# Patient Record
Sex: FEMALE | Race: WHITE | Employment: UNEMPLOYED | URBAN - METROPOLITAN AREA
[De-identification: names, ages, dates, MRNs, and addresses within clinical notes are randomized per-mention and may not be internally consistent; named-entity substitution may affect disease eponyms.]

---

## 2017-01-03 ENCOUNTER — OFFICE VISIT (OUTPATIENT)
Dept: PSYCHOLOGY | Facility: CLINIC | Age: 52
End: 2017-01-03
Payer: COMMERCIAL

## 2017-01-03 DIAGNOSIS — F41.1 GENERALIZED ANXIETY DISORDER: Primary | ICD-10-CM

## 2017-01-03 DIAGNOSIS — F31.81 BIPOLAR II DISORDER, MOST RECENT EPISODE MAJOR DEPRESSIVE (H): ICD-10-CM

## 2017-01-03 PROCEDURE — 90834 PSYTX W PT 45 MINUTES: CPT | Performed by: PSYCHOLOGIST

## 2017-01-10 ENCOUNTER — OFFICE VISIT (OUTPATIENT)
Dept: PSYCHOLOGY | Facility: CLINIC | Age: 52
End: 2017-01-10
Payer: COMMERCIAL

## 2017-01-10 DIAGNOSIS — F41.1 GENERALIZED ANXIETY DISORDER: Primary | ICD-10-CM

## 2017-01-10 DIAGNOSIS — F31.81 BIPOLAR II DISORDER, MOST RECENT EPISODE MAJOR DEPRESSIVE (H): ICD-10-CM

## 2017-01-10 PROCEDURE — 90834 PSYTX W PT 45 MINUTES: CPT | Performed by: PSYCHOLOGIST
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
## 2017-01-17 NOTE — PROGRESS NOTES
Progress Note    Client Name: Andie Elkins  Date: 1/10/17         Service Type: Individual      Session Start Time: 3:00  Session End Time: 3:45      Session Length: 45     Session #: 20     Attendees: Client attended alone    Treatment Plan Last Reviewed: 1/10/17  PHQ-9 / NATHALIE-7 : 8/16/2016     DATA      Progress Since Last Session (Related to Symptoms / Goals / Homework):   Symptoms: Stable    Homework: Partially completed      Episode of Care Goals: Minimal progress - ACTION (Actively working towards change); Intervened by reinforcing change plan / affirming steps taken.        Current / Ongoing Stressors and Concerns:   Reported feeling more aligned with her wife after they set some limits with client's step-son during his recent visit. Reported that stress at work is dissipating and she is feeling supported by her supervisor.      Treatment Objective(s) Addressed in This Session:   mood stability  Anger management     Intervention:   Solution Focused: reviewed skills she has been using to manage anger appropriately; coached her on assertiveness skills to use with spouse and family  Supportive therapy: provided active listening, support, and encouragement. Reinforced healthy behavioral choices. Applauded client for her report of working with her spouse to mutually set limits with step-son.        ASSESSMENT: Current Emotional / Mental Status (status of significant symptoms):   Risk status (Self / Other harm or suicidal ideation)   Client denies current fears or concerns for personal safety.   Client denies current or recent suicidal ideation or behaviors. Reviewed safety plan.   Client denies current or recent homicidal ideation or behaviors.   Client denies current or recent self injurious behavior or ideation.   Client denies other safety concerns.   A safety and risk management plan has been developed including: see below     [facility]  "[facility]    Name: Andie Elkins    SAFETY PLAN:  Step 1: Warning signs / cues (Thoughts, images, mood, situation, behavior) that a crisis may be developing:    Thoughts:  It doesn't matter, I don't care    Images: \"I'm not sure\"    Thinking Processes: Ruminating    Mood: Angry, sad    Behaviors: Isolation, arguing with spouse    Situations:  Arguing with family, feeling criticized  Step 2: Coping strategies - Things I can do to take my mind off of my problems without contacting another person (relaxation technique, physical activity):    Distress Tolerance Strategies:  Distract with reading, watching TV, fishing, prayer; go to work as scheduled; take a nap    Physical Activities: Yard work, home projects, go for a walk    Focus on helpful thoughts:  There are people who care about me; I'm good at my job and they need me to be there for my shifts; My sebastian will help me get through this  Step 3: People and social settings that provide distraction:   Name:  Mike  Phone:    Name:  Kacy  Phone:   Social Settings: [employer]ago's, work, alanon meetings  Step 4: Remind myself of people and things that are important to me and worth living for:  Spouse, alanon sponsor, my house, my job  Step 5: When I am in crisis, I can ask these people to help me use my safety plan:   Name: Mike  Phone:    Name: Kacy  Phone:   Step 6: Making the environment safe:     Having space from spouse, not spending too much time at home  Step 7: Professionals or agencies I can contact during a crisis:    [facility] Daytime and After Hours Crisis Number: [phone]    Suicide Prevention Lifeline: 0-551-419-TALK (8135)    Text for Life: Text the word  LIFE  to 89011.  Local Crisis Services:     Call 911 or go to my nearest emergency department.   I helped develop this safety plan and agree to use it when needed.  I have been given a copy of this plan.      Client signature " _________________________________________________________________  Today s date: 9/13/2016  Adapted from Safety Plan Template 2008 Julieta Leyva and Germain Fisher is reprinted with the express permission of the authors.  No portion of the Safety Plan Template may be reproduced without the express, written permission.  You can contact the authors at [email or web address] or [email or web address].       Appearance:   Appropriate    Eye Contact:   Good    Psychomotor Behavior: Normal    Attitude:   Cooperative    Orientation:   All   Speech    Rate / Production: Normal     Volume:  Normal    Mood:    Anxious    Affect:    Appropriate    Thought Content:  Rumination    Thought Form:  Coherent  Logical    Insight:    Fair      Medication Review:   No changes to current psychiatric medication(s)     Medication Compliance:   Yes     Changes in Health Issues:   None reported     Chemical Use Review:   Substance Use: Chemical use reviewed, no active concerns identified      Tobacco Use: No current tobacco use.       Collateral Reports Completed:   Not Applicable     PLAN: (Client Tasks / Therapist Tasks / Other)  Future appointments are scheduled. Follow safety plan. Client will use skills discussed in session to take breaks to calm herself when needed. Use assertive, rather than aggressive, communication skills as discussed in session to address relationship concerns with spouse, family, and coworkers. Use self-care skills as discussed in session to balance mood.     I informed client today that, effective April 4, 2017, my schedule will be changing and I will no longer be offering hours that fall after her work schedule. I explored whether client would be able to make arrangements at work (e.g. Leave an hour early) in order for us to continue working together. Client seemed quite adamant that she would not be able to make those accommodations at work. Client will discuss this with her spouse and get in contact with  her insurance company to search for another in-network provider. I offered additional referrals and client said she would consider them. Client was agreeable with keeping regular visits with me until my schedule changes. I informed her that, with her permission, I would be happy to help facilitate a transfer to another therapist.     Mesha Krishnan PSYD LP                                                                                                      Treatment Plan    Client's Name: Andie Elkins  YOB: 1965    Date: 1/10/17    DSM-V Diagnoses: Bipolar II Disorder, most recent episode depressed; f41.1 generalized anxiety disorder  Psychosocial / Contextual Factors: marital strain, little social support  WHODAS: 41    Referral / Collaboration:  Referral to another professional/service is not indicated at this time..    Anticipated number of session or this episode of care: reassess after 12 sessions      MeasurableTreatment Goal(s) related to diagnosis / functional impairment(s)  Goal 1: Client will follow safety plan.      Objective #A (Client Action)    Client will reference her safety plan when SI thoughts are present..  Status: Continued - Date(s): 1/10/17    Intervention(s)  Therapist will review safety plan each session; assist client in updating safety plan as needed.      Goal 2: Client will learn and use coping skills to stabilize mood, manage anger, and improve relationships.    I will know I've met my goal when I'm feeling more stable.      Objective #A (Client Action)    Client will increase awareness of internal experiences of anger and use calming strategies (e.g. time out, breathing, distractions) to manage more effectively.  Status: Continued - Date(s): 1/10/17    Intervention(s)  Therapist will teach skills to increase awareness of emotions; teach calming skills.    Objective #B  Client will learn and use assertive communication skills.  Status: Continued - Date(s):  1/10/17    Intervention(s)  Therapist will teach assertiveness skills; role play when needed.    Objective #C  Client will identify and participate in a healthy self-care routine.  Status: Continued - Date(s): 1/10/17    Intervention(s)  Therapist will assist client in developing and following a self-care routine, assign homework.        Client has reviewed and agreed to the above plan.      Mesha Krishnan PSYD LP  1/10/17

## 2017-01-25 ENCOUNTER — HOSPITAL ENCOUNTER (OUTPATIENT)
Facility: CLINIC | Age: 52
Setting detail: OBSERVATION
Discharge: HOME HEALTH | End: 2017-01-26
Attending: EMERGENCY MEDICINE | Admitting: INTERNAL MEDICINE
Payer: COMMERCIAL

## 2017-01-25 ENCOUNTER — APPOINTMENT (OUTPATIENT)
Dept: SPEECH THERAPY | Facility: CLINIC | Age: 52
End: 2017-01-25
Attending: PHYSICIAN ASSISTANT
Payer: COMMERCIAL

## 2017-01-25 ENCOUNTER — APPOINTMENT (OUTPATIENT)
Dept: CT IMAGING | Facility: CLINIC | Age: 52
End: 2017-01-25
Attending: EMERGENCY MEDICINE
Payer: COMMERCIAL

## 2017-01-25 ENCOUNTER — TELEPHONE (OUTPATIENT)
Dept: FAMILY MEDICINE | Facility: CLINIC | Age: 52
End: 2017-01-25

## 2017-01-25 ENCOUNTER — APPOINTMENT (OUTPATIENT)
Dept: MRI IMAGING | Facility: CLINIC | Age: 52
End: 2017-01-25
Attending: EMERGENCY MEDICINE
Payer: COMMERCIAL

## 2017-01-25 ENCOUNTER — APPOINTMENT (OUTPATIENT)
Dept: MRI IMAGING | Facility: CLINIC | Age: 52
End: 2017-01-25
Attending: PSYCHIATRY & NEUROLOGY
Payer: COMMERCIAL

## 2017-01-25 ENCOUNTER — APPOINTMENT (OUTPATIENT)
Dept: GENERAL RADIOLOGY | Facility: CLINIC | Age: 52
End: 2017-01-25
Attending: EMERGENCY MEDICINE
Payer: COMMERCIAL

## 2017-01-25 DIAGNOSIS — R53.1 LEFT-SIDED WEAKNESS: Primary | ICD-10-CM

## 2017-01-25 DIAGNOSIS — G45.9 TRANSIENT CEREBRAL ISCHEMIA, UNSPECIFIED TYPE: ICD-10-CM

## 2017-01-25 DIAGNOSIS — J06.9 VIRAL URI: ICD-10-CM

## 2017-01-25 LAB
ALBUMIN UR-MCNC: NEGATIVE MG/DL
ANION GAP SERPL CALCULATED.3IONS-SCNC: 6 MMOL/L (ref 3–14)
APPEARANCE UR: CLEAR
BASOPHILS # BLD AUTO: 0 10E9/L (ref 0–0.2)
BASOPHILS NFR BLD AUTO: 0.5 %
BILIRUB UR QL STRIP: NEGATIVE
BUN SERPL-MCNC: 19 MG/DL (ref 7–30)
CALCIUM SERPL-MCNC: 8.8 MG/DL (ref 8.5–10.1)
CHLORIDE SERPL-SCNC: 106 MMOL/L (ref 94–109)
CO2 SERPL-SCNC: 28 MMOL/L (ref 20–32)
COLOR UR AUTO: YELLOW
CREAT SERPL-MCNC: 0.67 MG/DL (ref 0.52–1.04)
DIFFERENTIAL METHOD BLD: ABNORMAL
EOSINOPHIL # BLD AUTO: 0.1 10E9/L (ref 0–0.7)
EOSINOPHIL NFR BLD AUTO: 1.7 %
ERYTHROCYTE [DISTWIDTH] IN BLOOD BY AUTOMATED COUNT: 12.4 % (ref 10–15)
FLUAV+FLUBV AG SPEC QL: NEGATIVE
FLUAV+FLUBV AG SPEC QL: NORMAL
GFR SERPL CREATININE-BSD FRML MDRD: NORMAL ML/MIN/1.7M2
GLUCOSE BLDC GLUCOMTR-MCNC: 90 MG/DL (ref 70–99)
GLUCOSE SERPL-MCNC: 95 MG/DL (ref 70–99)
GLUCOSE UR STRIP-MCNC: NEGATIVE MG/DL
HCT VFR BLD AUTO: 34.3 % (ref 35–47)
HGB BLD-MCNC: 11.8 G/DL (ref 11.7–15.7)
HGB UR QL STRIP: ABNORMAL
IMM GRANULOCYTES # BLD: 0 10E9/L (ref 0–0.4)
IMM GRANULOCYTES NFR BLD: 0.2 %
KETONES UR STRIP-MCNC: NEGATIVE MG/DL
LEUKOCYTE ESTERASE UR QL STRIP: ABNORMAL
LYMPHOCYTES # BLD AUTO: 1 10E9/L (ref 0.8–5.3)
LYMPHOCYTES NFR BLD AUTO: 24.8 %
MCH RBC QN AUTO: 32.5 PG (ref 26.5–33)
MCHC RBC AUTO-ENTMCNC: 34.4 G/DL (ref 31.5–36.5)
MCV RBC AUTO: 95 FL (ref 78–100)
MONOCYTES # BLD AUTO: 0.5 10E9/L (ref 0–1.3)
MONOCYTES NFR BLD AUTO: 12.1 %
MUCOUS THREADS #/AREA URNS LPF: PRESENT /LPF
NEUTROPHILS # BLD AUTO: 2.6 10E9/L (ref 1.6–8.3)
NEUTROPHILS NFR BLD AUTO: 60.7 %
NITRATE UR QL: NEGATIVE
NRBC # BLD AUTO: 0 10*3/UL
NRBC BLD AUTO-RTO: 0 /100
PH UR STRIP: 5.5 PH (ref 5–7)
PLATELET # BLD AUTO: 196 10E9/L (ref 150–450)
POTASSIUM SERPL-SCNC: 4.1 MMOL/L (ref 3.4–5.3)
PROCALCITONIN SERPL-MCNC: NORMAL NG/ML
RBC # BLD AUTO: 3.63 10E12/L (ref 3.8–5.2)
RBC #/AREA URNS AUTO: 4 /HPF (ref 0–2)
SODIUM SERPL-SCNC: 140 MMOL/L (ref 133–144)
SP GR UR STRIP: 1.02 (ref 1–1.03)
SPECIMEN SOURCE: NORMAL
SQUAMOUS #/AREA URNS AUTO: 1 /HPF (ref 0–1)
URN SPEC COLLECT METH UR: ABNORMAL
UROBILINOGEN UR STRIP-MCNC: NORMAL MG/DL (ref 0–2)
WBC # BLD AUTO: 4.2 10E9/L (ref 4–11)
WBC #/AREA URNS AUTO: 2 /HPF (ref 0–2)

## 2017-01-25 PROCEDURE — A9585 GADOBUTROL INJECTION: HCPCS | Performed by: INTERNAL MEDICINE

## 2017-01-25 PROCEDURE — 80048 BASIC METABOLIC PNL TOTAL CA: CPT | Performed by: EMERGENCY MEDICINE

## 2017-01-25 PROCEDURE — 71020 XR CHEST 2 VW: CPT

## 2017-01-25 PROCEDURE — 27210995 ZZH RX 272: Performed by: EMERGENCY MEDICINE

## 2017-01-25 PROCEDURE — 99285 EMERGENCY DEPT VISIT HI MDM: CPT | Mod: 25

## 2017-01-25 PROCEDURE — G0378 HOSPITAL OBSERVATION PER HR: HCPCS

## 2017-01-25 PROCEDURE — 25500064 ZZH RX 255 OP 636: Performed by: EMERGENCY MEDICINE

## 2017-01-25 PROCEDURE — 81001 URINALYSIS AUTO W/SCOPE: CPT | Performed by: EMERGENCY MEDICINE

## 2017-01-25 PROCEDURE — 25000128 H RX IP 250 OP 636: Performed by: PHYSICIAN ASSISTANT

## 2017-01-25 PROCEDURE — 99220 ZZC INITIAL OBSERVATION CARE,LEVL III: CPT | Performed by: INTERNAL MEDICINE

## 2017-01-25 PROCEDURE — 40000225 ZZH STATISTIC SLP WARD VISIT

## 2017-01-25 PROCEDURE — 70549 MR ANGIOGRAPH NECK W/O&W/DYE: CPT

## 2017-01-25 PROCEDURE — 70553 MRI BRAIN STEM W/O & W/DYE: CPT

## 2017-01-25 PROCEDURE — 84145 PROCALCITONIN (PCT): CPT | Performed by: EMERGENCY MEDICINE

## 2017-01-25 PROCEDURE — 85025 COMPLETE CBC W/AUTO DIFF WBC: CPT | Performed by: EMERGENCY MEDICINE

## 2017-01-25 PROCEDURE — 70544 MR ANGIOGRAPHY HEAD W/O DYE: CPT | Mod: XS

## 2017-01-25 PROCEDURE — A9585 GADOBUTROL INJECTION: HCPCS | Performed by: EMERGENCY MEDICINE

## 2017-01-25 PROCEDURE — 92610 EVALUATE SWALLOWING FUNCTION: CPT | Mod: GN

## 2017-01-25 PROCEDURE — 70450 CT HEAD/BRAIN W/O DYE: CPT

## 2017-01-25 PROCEDURE — 25500064 ZZH RX 255 OP 636: Performed by: INTERNAL MEDICINE

## 2017-01-25 PROCEDURE — 25000125 ZZHC RX 250: Performed by: INTERNAL MEDICINE

## 2017-01-25 PROCEDURE — 00000146 ZZHCL STATISTIC GLUCOSE BY METER IP

## 2017-01-25 PROCEDURE — 72156 MRI NECK SPINE W/O & W/DYE: CPT

## 2017-01-25 PROCEDURE — 87804 INFLUENZA ASSAY W/OPTIC: CPT | Performed by: EMERGENCY MEDICINE

## 2017-01-25 RX ORDER — OLANZAPINE 10 MG/1
10 TABLET ORAL DAILY PRN
Status: DISCONTINUED | OUTPATIENT
Start: 2017-01-25 | End: 2017-01-26 | Stop reason: HOSPADM

## 2017-01-25 RX ORDER — NALOXONE HYDROCHLORIDE 0.4 MG/ML
.1-.4 INJECTION, SOLUTION INTRAMUSCULAR; INTRAVENOUS; SUBCUTANEOUS
Status: DISCONTINUED | OUTPATIENT
Start: 2017-01-25 | End: 2017-01-26 | Stop reason: HOSPADM

## 2017-01-25 RX ORDER — HYDRALAZINE HYDROCHLORIDE 20 MG/ML
10-20 INJECTION INTRAMUSCULAR; INTRAVENOUS
Status: DISCONTINUED | OUTPATIENT
Start: 2017-01-25 | End: 2017-01-26 | Stop reason: HOSPADM

## 2017-01-25 RX ORDER — ONDANSETRON 4 MG/1
4 TABLET, ORALLY DISINTEGRATING ORAL EVERY 6 HOURS PRN
Status: DISCONTINUED | OUTPATIENT
Start: 2017-01-25 | End: 2017-01-26 | Stop reason: HOSPADM

## 2017-01-25 RX ORDER — GADOBUTROL 604.72 MG/ML
6 INJECTION INTRAVENOUS ONCE
Status: COMPLETED | OUTPATIENT
Start: 2017-01-25 | End: 2017-01-25

## 2017-01-25 RX ORDER — GABAPENTIN 300 MG/1
300 CAPSULE ORAL AT BEDTIME
Status: DISCONTINUED | OUTPATIENT
Start: 2017-01-25 | End: 2017-01-25

## 2017-01-25 RX ORDER — LIDOCAINE 40 MG/G
CREAM TOPICAL
Status: DISCONTINUED | OUTPATIENT
Start: 2017-01-25 | End: 2017-01-26 | Stop reason: HOSPADM

## 2017-01-25 RX ORDER — PROCHLORPERAZINE 25 MG
25 SUPPOSITORY, RECTAL RECTAL EVERY 12 HOURS PRN
Status: DISCONTINUED | OUTPATIENT
Start: 2017-01-25 | End: 2017-01-26 | Stop reason: HOSPADM

## 2017-01-25 RX ORDER — LABETALOL HYDROCHLORIDE 5 MG/ML
10-40 INJECTION, SOLUTION INTRAVENOUS EVERY 10 MIN PRN
Status: DISCONTINUED | OUTPATIENT
Start: 2017-01-25 | End: 2017-01-26 | Stop reason: HOSPADM

## 2017-01-25 RX ORDER — AMOXICILLIN 250 MG
1-2 CAPSULE ORAL 2 TIMES DAILY PRN
Status: DISCONTINUED | OUTPATIENT
Start: 2017-01-25 | End: 2017-01-26 | Stop reason: HOSPADM

## 2017-01-25 RX ORDER — DIVALPROEX SODIUM 500 MG/1
500 TABLET, EXTENDED RELEASE ORAL AT BEDTIME
Status: DISCONTINUED | OUTPATIENT
Start: 2017-01-25 | End: 2017-01-26 | Stop reason: HOSPADM

## 2017-01-25 RX ORDER — DIVALPROEX SODIUM 500 MG/1
500 TABLET, EXTENDED RELEASE ORAL AT BEDTIME
COMMUNITY
End: 2017-06-06

## 2017-01-25 RX ORDER — GABAPENTIN 300 MG/1
900 CAPSULE ORAL AT BEDTIME
Status: DISCONTINUED | OUTPATIENT
Start: 2017-01-25 | End: 2017-01-26 | Stop reason: HOSPADM

## 2017-01-25 RX ORDER — ASPIRIN 81 MG/1
81 TABLET ORAL DAILY
Status: DISCONTINUED | OUTPATIENT
Start: 2017-01-25 | End: 2017-01-26 | Stop reason: HOSPADM

## 2017-01-25 RX ORDER — LIDOCAINE 40 MG/G
CREAM TOPICAL
Status: DISCONTINUED | OUTPATIENT
Start: 2017-01-25 | End: 2017-01-25

## 2017-01-25 RX ORDER — GADOBUTROL 604.72 MG/ML
10 INJECTION INTRAVENOUS ONCE
Status: COMPLETED | OUTPATIENT
Start: 2017-01-25 | End: 2017-01-25

## 2017-01-25 RX ORDER — TRAZODONE HYDROCHLORIDE 100 MG/1
200 TABLET ORAL AT BEDTIME
Status: DISCONTINUED | OUTPATIENT
Start: 2017-01-25 | End: 2017-01-26 | Stop reason: HOSPADM

## 2017-01-25 RX ORDER — DOCUSATE SODIUM 100 MG/1
100 CAPSULE, LIQUID FILLED ORAL AT BEDTIME
Status: DISCONTINUED | OUTPATIENT
Start: 2017-01-25 | End: 2017-01-26 | Stop reason: HOSPADM

## 2017-01-25 RX ORDER — SODIUM CHLORIDE 9 MG/ML
INJECTION, SOLUTION INTRAVENOUS CONTINUOUS
Status: DISCONTINUED | OUTPATIENT
Start: 2017-01-25 | End: 2017-01-25

## 2017-01-25 RX ORDER — ACETAMINOPHEN 325 MG/1
650 TABLET ORAL EVERY 4 HOURS PRN
Status: DISCONTINUED | OUTPATIENT
Start: 2017-01-25 | End: 2017-01-26 | Stop reason: HOSPADM

## 2017-01-25 RX ORDER — PROCHLORPERAZINE MALEATE 5 MG
5-10 TABLET ORAL EVERY 6 HOURS PRN
Status: DISCONTINUED | OUTPATIENT
Start: 2017-01-25 | End: 2017-01-26 | Stop reason: HOSPADM

## 2017-01-25 RX ORDER — ACYCLOVIR 200 MG/1
60 CAPSULE ORAL ONCE
Status: COMPLETED | OUTPATIENT
Start: 2017-01-25 | End: 2017-01-25

## 2017-01-25 RX ORDER — ONDANSETRON 2 MG/ML
4 INJECTION INTRAMUSCULAR; INTRAVENOUS EVERY 6 HOURS PRN
Status: DISCONTINUED | OUTPATIENT
Start: 2017-01-25 | End: 2017-01-26 | Stop reason: HOSPADM

## 2017-01-25 RX ORDER — CLONAZEPAM 0.5 MG/1
0.5 TABLET ORAL AT BEDTIME
Status: DISCONTINUED | OUTPATIENT
Start: 2017-01-25 | End: 2017-01-26 | Stop reason: HOSPADM

## 2017-01-25 RX ORDER — DIVALPROEX SODIUM 500 MG/1
1000 TABLET, EXTENDED RELEASE ORAL AT BEDTIME
Status: DISCONTINUED | OUTPATIENT
Start: 2017-01-25 | End: 2017-01-25

## 2017-01-25 RX ADMIN — GADOBUTROL 6 ML: 604.72 INJECTION INTRAVENOUS at 22:07

## 2017-01-25 RX ADMIN — SODIUM CHLORIDE: 9 INJECTION, SOLUTION INTRAVENOUS at 12:13

## 2017-01-25 RX ADMIN — DEXTROSE AND SODIUM CHLORIDE: 5; 900 INJECTION, SOLUTION INTRAVENOUS at 15:02

## 2017-01-25 RX ADMIN — GADOBUTROL 10 ML: 604.72 INJECTION INTRAVENOUS at 11:24

## 2017-01-25 RX ADMIN — SODIUM CHLORIDE 60 ML: 9 INJECTION, SOLUTION INTRAMUSCULAR; INTRAVENOUS; SUBCUTANEOUS at 11:25

## 2017-01-25 ASSESSMENT — VISUAL ACUITY
OU: BLURRED VISION
OU: NORMAL ACUITY
OU: BLURRED VISION

## 2017-01-25 ASSESSMENT — ENCOUNTER SYMPTOMS
MYALGIAS: 1
FEVER: 1
COUGH: 1
CHILLS: 1
ARTHRALGIAS: 1
NUMBNESS: 1
HEADACHES: 0
WEAKNESS: 1

## 2017-01-25 NOTE — CONSULTS
CONSULTATION      CHIEF COMPLAINT:  Evaluate for left-sided numbness.      HISTORY OF PRESENT ILLNESS:  Andie Elkins is a 51-year-old right-handed lady who presents for evaluation of left-sided symptoms of weakness and tingling and numbness.      She actually describes that about 3 months ago she started to feel weakness on the left upper and lower extremity.  She describes that she still could function, but like if she were to carry dishes she would need to do less of that.  She has never fallen.  She did not require any cane.      Since Monday of this week, she started to feel numbness around her left eye.  She had developed intermittent numbness and tingling in left upper and left lower extremity.      The patient states that she also had developed fever.      She did change 1 mental health medication but does not remember which one.  She came to the emergency room and she did have an MRI of the brain there which I personally reviewed.  This study is essentially quite normal.  There is no evidence of chronic ischemic changes and definitely no evidence of stroke.  She has been afebrile over the last few days.      ALLERGIES:  Codeine and penicillin.      MEDICATIONS:  Zyprexa, Klonopin, Depakote, Neurontin, trazodone, Colace and aspirin.      PAST MEDICAL HISTORY:  Bipolar disorder, major depressive disorder, hyperlipidemia, GERD, breast cancer, lumbago and MI.  She also has history for a fracture of her arm, endometriosis and lumpectomy.      SOCIAL HISTORY:  She works.  She is a nondrinker, nonsmoker.      FAMILY HISTORY:  Noncontributory to this presentation.      REVIEW OF SYSTEMS:  She has been having fever and headache and felt generally weak.  She did not have any nausea or vomiting.  The remainder of the organs and systems review is negative for acute changes and stable for chronic systems.      PHYSICAL EXAMINATION:   VITAL SIGNS:  Blood pressure is 110/66, temperature 97.4, respiratory rate 16,  pulse is 63.   GENERAL:  She is tired but in no acute distress.   LUNGS:  Clear to auscultation bilaterally.   CARDIOVASCULAR:  S1, S2 cardiac sounds with no murmurs or bruits.   ABDOMEN:  Soft, nontender, bowel sounds are present.   NEUROLOGIC:  The patient is awake and alert x3 with clear speech and language function.  She again does appear to be tired.      The patient does have full range of extraocular eye movements.  There seems to be a left visual field cut; however, again MRI of the brain is completely normal.      The remainder of the cranial nerve examination is normal.  Motor examination shows no evidence of drift.  Full strength in both upper and lower extremities, maybe some weakness in the left lower extremity but is subject to her subjective input while testing.  Reflexes are symmetrical.  No abnormal reflexes are present.  Toes are downgoing.  Subjectively, she reports decreased sensation to vibration and pinprick on the left upper and lower extremity.  Coordination shows intact right-sided finger-nose-finger and some clumsiness on the left.  Gait is not checked.      IMPRESSION:  This patient presents with a 3 month history of subjective weakness affecting the left upper and left lower extremity and a new onset of paresthesias on the left face, arm and leg.  Current presentation is not consistent with stroke or TIA as the symptoms have been lasting for more than 3 days and there are no subsequent MRI changes in the brain to show stroke, chronic vascular changes, no evidence of structural lesions in the brain were seen, including no evidence of multiple sclerosis.      My impression the patient currently is having febrile illness which makes her weaker and probably she has headache and sinus infection which would explain pain, pressure and numbness around her left eye.      She does complain of 3-month history of left-sided weakness.  I think as her MRI is normal, we need to obtain cervical MRI to  rule out structural lesion in the cervical spine.  If this test is negative I do not suggest any additional neurological evaluation.      Neurologic evaluation today does not show any evidence of vascular disease here.  I do not think we need to be worried about stroke or TIA again.  We will pursue a cervical spine MRI and physical therapy evaluation.      I discussed all the above with the patient and nursing staff.         MACIEL BAKER MD             D: 2017 14:04   T: 2017 14:31   MT:       Name:     RICARDO OGLESBY   MRN:      [id number]        Account:       [id number]   :      1965           Consult Date:  2017      Document: [id number]

## 2017-01-25 NOTE — ED PROVIDER NOTES
"  History     Chief Complaint:  Numbness      The history is provided by the patient.      Andie Elkins is a 51 year old female who presents via EMS with numbness.  The patient developed alternating chills and feeling flushed as well as cough this past Saturday.  Her cough has been alternately dry and productive.  Sunday and Monday she had developed high fevers but was able to return to work Tuesday, although still felt unwell with weakness on her left side \"like my leg was collapsing\".  Additionally, there is an area of numbness from just above to just below her eye on the left side of her face.  This weakness and numbness persisted so she tried to see her primary doctor today who referred her to the emergency department.  Currently she also states it feels like the left side of her face is swollen and eye wants to close up; she does note production of mucous from the left frequently during symptoms.  She endorses more numbness than weakness in the left leg.  She denies headache, visual disturbance, dental pain, otalgia, or other complaint.     Allergies:  Codeine  Penicillins       Medications:    Zyprexa  Klonopin  Depakote  Neurontin  Trazodone  Colace   Aspirin     Past Medical History:    Suicidal ideation  Personal history of malignant neoplasm of breast  GERD  Major depressive disorder  Bipolar II disorder  Hyperlipidemia  Lumbago  Myocardial infarction    Past Surgical History:    Lower arm reconstruction after fracture  Orthopedic surgery  Endometriosis   Lumpectomy breast, left     Family History:    Mother - Lipids, HTN, CV disease, Depression, Diabetes, Heart murmur  Father - CAD, Diabetes, alcohol/drug, Lung cancer, Dementia  Sister - Epilepsy, CV disease  Brother - Alcohol/drug     Social History:  Presents via EMS   Tobacco use: Never  Alcohol use: Occasional  PCP: Christina Izaguirre    Marital Status:          Review of Systems   Constitutional: Positive for fever and chills.   HENT: " "Negative for dental problem and ear pain.    Respiratory: Positive for cough.    Musculoskeletal: Positive for myalgias and arthralgias.   Neurological: Positive for weakness and numbness. Negative for headaches.   All other systems reviewed and are negative.      Physical Exam     Patient Vitals for the past 24 hrs:   BP Temp Temp src Heart Rate Resp SpO2 Height Weight   01/25/17 0924 - - - - - 97 % - -   01/25/17 0923 114/72 mmHg - - - - - - -   01/25/17 0845 103/68 mmHg - - - - 94 % - -   01/25/17 0824 116/69 mmHg 97.8  F (36.6  C) Oral 76 16 97 % 1.6 m (5' 3\") 63.05 kg (139 lb)       Physical Exam  GENERAL: well developed, pleasant  HEAD: atraumatic.  Reproducible pain to left zygomatic arch and maxillary sinuses.  No pain to percussion of the teeth.    EYES: pupils reactive, extraocular muscles intact, conjunctivae normal  ENT:  mucus membranes moist  NECK:  trachea midline, normal range of motion  RESPIRATORY: no tachypnea, breath sounds clear to auscultation   CVS: normal S1/S2, no murmurs, intact distal pulses  ABDOMEN: soft, nontender, nondistention  MUSCULOSKELETAL: no deformities  SKIN: warm and dry, no acute rashes or ulceration  NEURO: GCS 15, cranial nerves intact, alert and oriented x3.  Unstable gait.  4/5  strength on left arm and 4/5 strength in left leg.    PSYCH:  Mood/affect normal.      Emergency Department Course   Imaging:  Radiographic findings were communicated with the patient who voiced understanding of the findings.    XR Chest:  IMPRESSION: No acute cardiopulmonary abnormality.  REINA RODRÍGUEZ MD    CT Head without contrast:  IMPRESSION: Normal CT scan of the head.  JEREMY PEACE MD    MR Head without contrast angiogram:  pending    MR Neck without and with contrast angiogram:  pending    MR Brain without and with contrast:  pending    Preliminary result per radiology.    Laboratory:  CBC: WNL (WBC 4.2, HGB 11.8, )   BMP: WNL (Creatinine 0.67)   Procalcitonin: <0.05    UA: " "Blood trace, Leukocyte esterase large, RBC 4 (H), Mucous present, o/w Negative     Influenza a/B antigen: A Negative, B Negative      Emergency Department Course:  The patient arrived in the emergency department via EMS.   Past medical records, nursing notes, and vitals reviewed.  0817: I performed an exam of the patient as documented above.    IV inserted and blood drawn.   The patient was sent for XR & CT while in the emergency department, findings above.   0924: I rechecked the patient. Explained findings to patient.   I personally reviewed the laboratory results with the Patient and answered all related questions prior to admission.    Findings and plan explained to the patient who consents to observation.     0944: Discussed the patient with Dr. Alvarenga, who will place the patient in observation in the observation area.       The patient was sent for MRI/MRA while in the emergency department, findings above.     Impression & Plan    Medical Decision Making:  Considered differential including stroke, sinusitis, influenza, or other source for infection but given the unstable gait and left sided weakness certainly stroke seems to be the highest on the differential.  CT and labs are fairly unremarkable with a normal procalcitonin and no obvious sinusitis on a head CT.  Patient's gait is unstable and she has slight weakness in left hand with  strength.  Discussed getting MRI either here in the ER with outpatient management or coming in to the hospital with further imaging and the significant other feels very strongly about coming in to the hospital.  MRI's were ordered and are pending.  After further talking to the patient she notes she has had some left arm weakness for the last few months and also intermittently left leg \"goes out on her\".  Possibly if MRI is normal this is more of a musculoskeletal issue.  Certainly MS could have intermittent symptoms as well.      Plan:  Admit for further imaging and " workup.      Diagnosis:    ICD-10-CM   1. Transient cerebral ischemia, unspecified type G45.9   2. Viral URI J06.9    B97.89       Disposition:  Admitted to hospitalist service.      Arnoldo Chua  1/25/2017    EMERGENCY DEPARTMENT    IArnoldo, am serving as a scribe at 8:17 AM on 1/25/2017 to document services personally performed by Josue Patrick MD based on my observations and the provider's statements to me.      Josue Patrick MD  01/25/17 5851

## 2017-01-25 NOTE — PROGRESS NOTES
"   01/25/17 1357   General Information   Onset Date 01/25/17   Start of Care Date 01/25/17   Referring Physician Severo James PA   Patient Profile Review/OT: Additional Occupational Profile Info See Profile for full history and prior level of function   Patient/Family Goals Statement Pt would like to eat and drink   Swallowing Evaluation Bedside swallow evaluation   Behaviorial Observations WFL (within functional limits)   Mode of current nutrition NPO   Respiratory Status Room air   Comments Andie Elkins is a 51 year old female who presents via EMS with numbness.  The patient developed alternating chills and feeling flushed as well as cough this past Saturday.  Her cough has been alternately dry and productive.  Sunday and Monday she had developed high fevers but was able to return to work Tuesday, although still felt unwell with weakness on her left side \"like my leg was collapsing\".  Additionally, there is an area of numbness from just above to just below her eye on the left side of her face.  This weakness and numbness persisted so she tried to see her primary doctor today who referred her to the emergency department.  Currently she also states it feels like the left side of her face is swollen and eye wants to close up; she does note production of mucous from the left frequently during symptoms.  She endorses more numbness than weakness in the left leg.  She denies headache, visual disturbance, dental pain, otalgia, or other complaint. Per neurologist; pt with no TIA or CVA.   Clinical Swallow Evaluation   Oral Musculature generally intact   Structural Abnormalities none present   Dentition present and adequate   Mucosal Quality adequate   Mandibular Strength and Mobility intact   Oral Labial Strength and Mobility impaired retraction;impaired pursing   Lingual Strength and Mobility impaired anterior elevation;impaired protrusion   Velar Elevation intact   Buccal Strength and Mobility " impaired   Laryngeal Function Cough;Throat clear;Swallow;Voicing initiated   Oral Musculature Comments Pt with    Additional Documentation Yes   Clinical Swallow Eval: Thin Liquid Texture Trial   Mode of Presentation, Thin Liquids spoon;cup;self-fed;fed by clinician   Volume of Liquid or Food Presented 3 oz   Oral Phase of Swallow Premature pharyngeal entry   Pharyngeal Phase of Swallow impaired;coughing/choking   Diagnostic Statement Trials of thin liquids via cup resulted in suspect premature pharyngeal entry and overt s/sx of aspiration marked by coughing    Clinical Swallow Eval: Puree Solid Texture Trial   Mode of Presentation, Puree spoon;self-fed   Volume of Puree Presented 1 tsp   Oral Phase, Puree Premature pharyngeal entry   Pharyngeal Phase, Puree impaired;coughing/choking   Diagnostic Statement 1 tsp of pureed textures via spoon resulted in suspect premature pharyngeal entry marked by coughing and pt gasping for air   VFSS Evaluation   VFSS Additional Documentation No   FEES Evaluation   Additional Documentation No   Swallow Compensations   Swallow Compensations No compensations were used   Results No compensations were used   Esophageal Phase of Swallow   Patient reports or presents with symptoms of esophageal dysphagia No   General Therapy Interventions   Planned Therapy Interventions Dysphagia Treatment   Dysphagia treatment Oropharyngeal exercise training   Swallow Eval: Clinical Impressions   Skilled Criteria for Therapy Intervention Skilled criteria met.  Treatment indicated.   Functional Assessment Scale (FAS) 1   Treatment Diagnosis Severe oropharyngeal dysphagia   Diet texture recommendations NPO   Demonstrates Need for Referral to Another Service dietitian;occupational therapy;physical therapy;other (see comments)  (psych)   Therapy Frequency daily   Predicted Duration of Therapy Intervention (days/wks) 1 week   Anticipated Discharge Disposition (defer to OT/PT)   Risks and Benefits of  Treatment have been explained. Yes   Patient, family and/or staff in agreement with Plan of Care Yes   Clinical Impression Comments SLP: Bedside swallow eval completed per MD orders. Pt presents with severe oropharyngeal dysphagia. Pt with slight left facial droop, however oral musculature generally intact. Initial trials of thin liquids via cup resulted in suspect premature pharyngeal entry and overt s/sx of aspiration marked by coughing; pt tolerated 3-4 sips of thin liquids via cup with no overt s/sx of aspiration. Pureed textures via spoon x1 resulted in overt s/sx of aspiration marked by coughing and pt gasping for air. No further PO trials appropriate d/t high risk for aspiration. Recommend NPO. Pt OK to have a small amount of ice chips for pleasure. Please complete thorough oral cares. ST to continue to follow for PO readiness.    Total Evaluation Time   Total Evaluation Time (Minutes) 20

## 2017-01-25 NOTE — IP AVS SNAPSHOT
[address] [facility]    [address]    [place] [address]    Phone:  [phone]                                       After Visit Summary   1/25/2017    Andie Elkins    MRN: [id number]           After Visit Summary Signature Page     I have received my discharge instructions, and my questions have been answered. I have discussed any challenges I see with this plan with the nurse or doctor.    ..........................................................................................................................................  Patient/Patient Representative Signature      ..........................................................................................................................................  Patient Representative Print Name and Relationship to Patient    ..................................................               ................................................  Date                                            Time    ..........................................................................................................................................  Reviewed by Signature/Title    ...................................................              ..............................................  Date                                                            Time

## 2017-01-25 NOTE — IP AVS SNAPSHOT
MRN:[id number]                      After Visit Summary   1/25/2017    Andie Elkins    MRN: [id number]           Thank you!     Thank you for choosing [facility] for your care. Our goal is always to provide you with excellent care. Hearing back from our patients is one way we can continue to improve our services. Please take a few minutes to complete the written survey that you may receive in the mail after you visit with us. Thank you!        Patient Information     Date Of Birth          1965        About your hospital stay     You were admitted on:  January 25, 2017 You last received care in the:  [facility] [address] [facility]    You were discharged on:  January 26, 2017        Reason for your hospital stay       You were hospitalized secondary to L sided weakness concerning for a stroke. The evaluation was negative and there was no evidence of a stroke.                  Who to Call     For medical emergencies, please call 911.  For non-urgent questions about your medical care, please call your primary care provider or clinic, [phone]          Attending Provider     Provider    Josue Patrick MD Schneider, Elton Watson MD       Primary Care Provider Office Phone # Fax #    Christina Izaguirre [phone]       [facility] [place] [address]  [place] [address]         When to contact your care team       Call your primary doctor if you have any of the following: new or worsening numbness or weakness.                  After Care Instructions     Activity       Your activity upon discharge: activity as tolerated            Diet       Follow this diet upon discharge: Orders Placed This Encounter  Room Service  Regular Diet Adult                    Follow-up Appointments     Follow-up and recommended labs and tests       Follow up with primary care provider, Christina Izaguirre, within 14 days for hospital follow- up.  The  following labs/tests are recommended: CBC.                  Your next 10 appointments already scheduled     Feb 07, 2017  3:00 PM   Return Visit with Mesha Krishnan PSYD   [facility] [place] ([facility] [place])    [address]  [place] [address]   [phone]            Feb 14, 2017  3:00 PM   Return Visit with Mesha Krishnan PSYD   [facility] [place] [facility] ([facility] [place])    [address]  [place] [address]   [phone]            Feb 21, 2017  3:00 PM   Return Visit with Mesha Krishnan PSYD   [facility] [place] ([facility] [place])    [address]  [place] [address]   [phone]            Feb 28, 2017  3:00 PM   Return Visit with Mesha Krishnan PSYD   [facility] [place] ([facility] [place])    [facility] [address]  [place] [address]   [phone]              Additional Services     Occupational Therapy Referral       *This therapy referral will be filtered to a centralized scheduling office at [facility] and the patient will receive a call to schedule an appointment at a [place] location most convenient for them. *     [facility] provides Occupational Therapy evaluation and treatment and many specialty services across the [place] system.  If requesting a specialty program, please choose from the list below.    If you have not heard from the scheduling office within 2 business days, please call [phone] for all locations, with the exception of [place], please call [phone].     Treatment: Evaluation & Treatment  Special Instructions/Modalities: None  Special Programs: None    Please be aware that coverage of these services is subject to the terms and limitations of your health insurance plan.  Call member services at your health plan with any benefit or coverage questions.      **Note to Provider:   If you are referring outside of [facility] for the therapy appointment, please list the name of the location in the  special instructions  above, print the referral and give to the patient to schedule the appointment.            Physical Therapy Referral       *This therapy referral will be filtered to a centralized scheduling office at [facility] and the patient will receive a call to schedule an appointment at a [place] location most convenient for them. *     [facility] provides Physical Therapy evaluation and treatment and many specialty services across the [place] system.  If requesting a specialty program, please choose from the list below.    If you have not heard from the scheduling office within 2 business days, please call [phone] for all locations, with the exception of [place], please call [phone].  Treatment: Evaluation & Treatment  Special Instructions/Modalities: none  Special Programs: None    Please be aware that coverage of these services is subject to the terms and limitations of your health insurance plan.  Call member services at your health plan with any benefit or coverage questions.      **Note to Provider:  If you are referring outside of [facility] for the therapy appointment, please list the name of the location in the  special instructions  above, print the referral and give to the patient to schedule the appointment.                  Further instructions from your care team       Follow-up with the [facility] with Dr. Hector with any seizure concerns. Call [phone] to make an appointment.   Address: [address] [place] [address] [address], [place] [address]            Pending Results     No orders found for last 2 day(s).            Statement of Approval     Ordered          01/26/17 1633  I have reviewed and agree with all the recommendations and orders detailed in this document.   EFFECTIVE NOW     Approved and  "electronically signed by:  Elton Alvarenga MD             Admission Information        Provider Department Dept Phone    1/25/2017 Elton Alvarenga MD  [facility] [phone]      Your Vitals Were     Blood Pressure Temperature Respirations    107/61 mmHg 98  F (36.7  C) (Oral) 16    Height Weight BMI (Body Mass Index)    1.6 m (5' 3\") 63.05 kg (139 lb) 24.63 kg/m2    Pulse Oximetry Last Period       94% 05/29/2008       [facility] Information     [facility] gives you secure access to your electronic health record. If you see a primary care provider, you can also send messages to your care team and make appointments. If you have questions, please call your primary care clinic.  If you do not have a primary care provider, please call [phone] and they will assist you.        Care EveryWhere ID     This is your Care EveryWhere ID. This could be used by other organizations to access your [facility] medical records  [id number]           Review of your medicines      CONTINUE these medicines which have NOT CHANGED        Dose / Directions    clonazePAM 0.5 MG tablet   Commonly known as:  klonoPIN        Dose:  0.5 mg   Take 1 tablet (0.5 mg) by mouth At Bedtime   Refills:  0       divalproex 500 MG 24 hr tablet   Commonly known as:  DEPAKOTE ER        Dose:  500 mg   Take 500 mg by mouth At Bedtime   Refills:  0       docusate sodium 100 MG capsule   Commonly known as:  COLACE        Dose:  100 mg   Take 1 capsule (100 mg) by mouth At Bedtime   Refills:  0       GABAPENTIN PO        Dose:  900 mg   Take 900 mg by mouth At Bedtime   Refills:  0       OLANZapine 10 MG tablet   Commonly known as:  zyPREXA   Used for:  Bipolar II disorder, most recent episode major depressive (H)   Notes to Patient:  Available anytime        Dose:  10 mg   Take 1 tablet (10 mg) by mouth daily as needed (agitation)   Quantity:  10 tablet   Refills:  0       traZODone 100 MG tablet   Commonly known as:  DESYREL        " Dose:  200 mg   Take 2 tablets (200 mg) by mouth At Bedtime   Refills:  0                Protect others around you: Learn how to safely use, store and throw away your medicines at www.disposemymeds.org.             Medication List: This is a list of all your medications and when to take them. Check marks below indicate your daily home schedule. Keep this list as a reference.      Medications           Morning Afternoon Evening Bedtime As Needed    clonazePAM 0.5 MG tablet   Commonly known as:  klonoPIN   Take 1 tablet (0.5 mg) by mouth At Bedtime   Next Dose Due:  1/26/17                                   divalproex 500 MG 24 hr tablet   Commonly known as:  DEPAKOTE ER   Take 500 mg by mouth At Bedtime   Next Dose Due:  1/26/17                                   docusate sodium 100 MG capsule   Commonly known as:  COLACE   Take 1 capsule (100 mg) by mouth At Bedtime   Next Dose Due:  1/26/17                                   GABAPENTIN PO   Take 900 mg by mouth At Bedtime   Next Dose Due:  1/26/17                                   OLANZapine 10 MG tablet   Commonly known as:  zyPREXA   Take 1 tablet (10 mg) by mouth daily as needed (agitation)   Notes to Patient:  Available anytime                                   traZODone 100 MG tablet   Commonly known as:  DESYREL   Take 2 tablets (200 mg) by mouth At Bedtime   Next Dose Due:  1/26/17

## 2017-01-25 NOTE — TELEPHONE ENCOUNTER
SONIA Chaves CHRISTIANO Elkins is a 51 year old female who calls with sinus, sore throat, facial numbness, SOB.    NURSING ASSESSMENT:  Description:  Sore throat- feels swollen, sinus congestion and pressure, mild SOB- speaking in full sentences, cough, facial numbness  Onset/duration:  1 day  Precip. factors:  na  Associated symptoms:   Facial numbness started yesterday from lip to eye lid on left side of face- eye feels strange when blinking- states her facial feature are symmetrical without any weakness or drooping.  Improves/worsens symptoms:  na    Allergies:   Allergies   Allergen Reactions     Codeine      Penicillins            NURSING PLAN: Nursing advice to patient go to ER now- patient stated that she is the only one at work until 9:00am and cannot leave the [place unattended or close the doors. advised the patient that it would be best to call in a manager.    RECOMMENDED DISPOSITION:  To ED, another person to drive - or call 911 if symptoms worsen  Will comply with recommendation: No- Barriers to comply with plan of care work. patient will wait for a manager to come into work and ask if she can leave early  If further questions/concerns or if symptoms do not improve, worsen or new symptoms develop, call your PCP or [facility] as soon as possible.      Guideline used:  Telephone Triage Protocols for Nurses, Fifth Edition, Chelo Lacy RN

## 2017-01-25 NOTE — H&P
DATE OF SERVICE:  01/25/2017       PRIMARY CARE PHYSICIAN:  Christina Izaguirre      CHIEF COMPLAINT:  Left-sided weakness and facial numbness.      HISTORY OF PRESENT ILLNESS:  Andie Elkins is a very pleasant 51-year-old female with a past medical history of major depressive disorder, bipolar 2 disorder, hyperlipidemia, GERD and breast cancer who presented to the Emergency Department due to left-sided facial numbness and left-sided weakness.  The patient reports that earlier in the week, approximately 4 days ago she developed upper respiratory infection symptoms with a cough, subjective fevers and chills and sinus congestion.  She was able to return to work on Tuesday, although still felt unwell and noticed that she had weakness on the left side of her body.  She stated that it felt like her left leg was going to give out on her.  She also had been experiencing an area of numbness from just above her eye to just below her eye on the left side of her face.  This weakness and numbness persisted so she tried to see her primary doctor today who referred to the Emergency Department.  The patient also reports feeling like the left side of her face was swollen and feels that her left eye wants to close.  She states that she has been having weakness including difficulty even holding her head up.  She states that while the left facial numbness is new that this left-sided upper extremity and left side lower extremity weakness has been coming and going over the last couple months.  She also has intermittent blurry vision and double vision along with headaches.  She denies any chest pain, shortness of breath, abdominal pain, vomiting or dysuria.      The patient was seen in the Emergency Department by Dr. Patrick.  There she was found to have an initial blood pressure of 116/69, heart rate of 76 and temperature 97.8.  She is satting 97% on room air.  She was noted to have some left upper and left lower extremity strength  deficits on exam.  A CT head without contrast was read as normal.  Chest x-ray was without any acute cardiopulmonary abnormality.  Her lab work was unremarkable with a normal CBC, normal BMP, negative procalcitonin and negative influenza swab.  Her urinalysis showed trace blood, large leukocyte esterase, 4 RBCs and mucus present but was otherwise not concerning for infection.  The MRI of the brain was performed in the ED that showed no evidence of hemorrhage, mass or acute infarct or anomaly.  Her MRI of the neck with contrast showed possible fibromuscular dysplasia in the vertebral arteries in the V2 segments and right V3 in the cervical right internal carotid artery.  Her vessels were otherwise patent.  Angiogram of the brain showed question of intracranial arterial stenosis versus artifacts with no evidence of arterial occlusion per Radiology.  The patient's symptoms have not since resolved in the Emergency Department.  She is being admitted to the hospital for further monitoring and treatment of possible TIA/CVA.      PAST MEDICAL HISTORY:   1.  Major depressive disorder with history of suicidal ideation.   2.  History of breast cancer, status post left breast lumpectomy.   3.  GERD.   4.  Bipolar 2 disorder.   5.  Hyperlipidemia.   6.  Lumbago.   7.  History of MI.      PAST SURGICAL HISTORY:     1.  Lower arm reconstruction after fracture.   2.  Orthopedic surgery.     3.  Surgery for endometriosis.   4.  Left breast lumpectomy.      FAMILY HISTORY:  Mother with hyperlipidemia, hypertension, cardiovascular disease, strokes, depression and diabetes.  Father with coronary artery disease, diabetes, lung cancer and dementia.  Sister with epilepsy, brother with alcohol/drug issues.      SOCIAL HISTORY:  The patient is a lifelong nonsmoker.  She drinks alcohol rarely.  Her wife, Lana is present with her at the bedside.      PRIOR TO ADMISSION MEDICATIONS:   1.  Zyprexa 10 mg daily as needed.   2.  Klonopin 0.5 mg  at bedtime.   3.  Depakote ER 1000 mg.   4.  Neurontin 300 mg at bedtime.    5.  Trazodone 200 mg at bedtime.   6.  Colace 100 mg at bedtime.      ALLERGIES:  Codeine and penicillins.      REVIEW OF SYSTEMS:  A complete 10-point review of systems was performed and is negative other than the items previously mentioned above HPI.      PHYSICAL EXAMINATION:   VITAL SIGNS:  Blood pressure 116/69, heart rate 76 beats per minute, temperature 97.8, respiratory rate 16, oxygen saturation 97% on room air.   GENERAL:  The patient is alert, oriented to person, place and situation, cooperative, lying in bed in no apparent distress.   HEENT:  Pupils equal and round.  Extraocular movements intact.  Head normocephalic, atraumatic.  Throat, lips, mucosa and tongue appear moist and normal.  Posterior pharynx clear.   NECK:  Supple.  No cervical adenopathy.   CARDIOVASCULAR:  Heart regular rate and rhythm, no murmur, rub or gallop.  Distal pulses are intact.  No edema.   PULMONARY:  Lungs clear to auscultation bilaterally, no crackles, wheezes or rhonchi.  Breathing is nonlabored.   GASTROINTESTINAL:  Abdomen is soft, nontender, nondistended with normoactive bowel sounds.   MUSCULOSKELETAL:  The patient is able to move all 4 extremities on command with noted weakness in both the left upper and left lower extremity.  Her limbs are atraumatic.   NEUROLOGIC:  Alert, cranial nerves II-XII are grossly intact and symmetric.  Strength is 5/5 in both right upper extremity and right lower extremity strength is 4/5 in left upper extremity and left lower extremity.  There is noted loss of sensation on the left side of the face extending from her temple down into her cheek.  Unable to assess gait at this time.  Her speech is normal and there is no facial droop.  No pronator drift.      IMAGING:  CT head without contrast was without any evidence of intracranial hemorrhage, mass or acute infarct or anomaly per Radiology.      MRI of the brain.   This was normal per Radiology.      MRA of the head and neck shows possible fibromuscular dysplasia in the vertebral arteries in the V2 segments and the right V3 segment and in the cervical right internal carotid artery.  Neck vessels were otherwise patent.  There was a question of intracranial arterial stenosis versus artifacts.  No evidence of intra-arterial occlusion per Radiology.      Chest x-ray:  No acute cardiopulmonary abnormality.      LABORATORY DATA:  BMP:  Potassium 4.1, creatinine 0.67, glucose 95, otherwise within normal limits.  Procalcitonin less than 0.015.  CBC with WBC of 4.2, hemoglobin 11.8, hematocrit 34.3, platelet count 196,000.  RBC 3.63, otherwise within normal limits.  Urinalysis with trace blood, large leukocyte esterase, 4 RBCs, mucus present.  Influenza swab was negative.      ASSESSMENT AND PLAN:  Andie Elkins is a pleasant 51-year-old female with a past medical history of major depressive disorder, bipolar disorder, hyperlipidemia, gastroesophageal reflux disease and spells who presented to the Emergency Department due to complaints of intermittent left-sided weakness along with acute left facial numbness.  She was registered to observation on the neuro tele unit for evaluation of transient ischemic attack, cerebrovascular accident or other neurological process.   1.  Left-sided weakness and left facial numbness.  The patient reports intermittent left upper extremity and left lower extremity weakness that is present off and on for the last month or so.  She at times will have associated headache, blurriness in the left eye with excessive tearing and dizziness.  Left facial numbness began yesterday and persisted until today.  She has no known strokes/transient ischemic attack history.  Her head CT here is negative for any acute intracranial pathology.  MRI of the brain is negative for any stroke, hemorrhage or mass.  MRA of the head and neck shows possible fibromuscular  dysplasia in the vertebral arteries in the V2 segments and right V3 segment of the cervical right internal carotid artery but vessel otherwise patent.  There is some possible intracranial artery stenoses versus artifact but no evidence of arterial occlusion per Radiology.  The patient's symptoms have not improved.  Currently, as there is no evidence for stroke this may be a transient ischemic attack versus complex migraine versus partial seizure activity versus other.  We will consult Neurology for further evaluation.  Monitor on telemetry.  Will obtain an echocardiogram with bubble study.  We will keep n.p.o. until passes swallow eval.  Consult PT, OT and Speech.  Frequent neuro checks.  Will give aspirin.   2.  Recent upper respiratory infection.  The patient developed symptoms of subjective fevers, chills, cough and sinus congestion over the weekend.  These issues have somewhat improved.  She has a normal white blood cell count and is afebrile here.  She has a negative influenza swab and negative procalcitonin.  At this point, would treat supportively.   3.  Major depressive disorder/bipolar 2 disorder.  The patient does not voice any worsening of psychiatric concerns at this time.  She will be continued on her prior to admission Depakote, Klonopin, Zyprexa and trazodone.  Could consider that there may be a psychiatric component to some of the patient's symptoms, possibly conversion disorder or worsening anxiety.  If no neurological cause is found, could consider a psychiatric consult.   4.  Deep venous thrombosis prophylaxis:  This will be mechanical with PCDs.      CODE STATUS:  Full code.      DISPOSITION:  Observation status.  Anticipate a short stay.      This [facility]  He is in agreement with my assessment and plan of care.  He will independently evaluate the patient.         JEREMY ALVARENGA MD       As dictated by MAME GOLDSTEIN  COTY REES            D: 2017 13:53   T: 2017 15:16   MT: hilda      Name:     RICARDO OGLESBY   MRN:      [id number]        Account:      [id number]   :      1965           Admitted:     [id number]      Document: [id number]       cc: Christina Izaguirre MD

## 2017-01-25 NOTE — ED NOTES
"[facility]  ED Nurse Handoff Report    ED Chief complaint: Numbness      ED Diagnosis:   Final diagnoses:   Transient cerebral ischemia, unspecified type   Viral URI       Code Status: Full Code    Allergies:   Allergies   Allergen Reactions     Codeine      Penicillins        Activity level:  Stand with Assist     Needed?: No    Isolation: Yes  Infection: Not Applicable    Bariatric?: No      Vital Signs:   Filed Vitals:    01/25/17 0824 01/25/17 0845 01/25/17 0923 01/25/17 0924   BP: 116/69 103/68 114/72    Temp: 97.8  F (36.6  C)      TempSrc: Oral      Resp: 16      Height: 1.6 m (5' 3\")      Weight: 63.05 kg (139 lb)      SpO2: 97% 94%  97%       Cardiac Rhythm: ,        Pain level: 0-10 Pain Scale: 2    Is this patient confused?: No    Patient Report: Initial Complaint: Cough and fever since Saturday. Weakness of left arm and left leg and left facial numbness since Monday.   Focused Assessment: Patient brought into ED from her place of work. She had been ill with cough and fever since Saturday. On Monday she began having weakness on her left side and numbness left cheek. She did try to go to work this morning but called EMS due to left side weakness. She had an 18 gauge IV placed left AC. She has had labs, a CXR and a head CT done. CT was negative but patient will be admitted due to severity of symptoms for further monitoring. She is having brain, head, neck  MRI at this time.  Tests Performed: head CT, urine, labs, CXR, flu culture  Abnormal Results: see epic  Treatments provided: see [facility]    Family Comments: wife at bedside    OBS brochure/video discussed/provided to patient: Yes    ED Medications:   Medications   lidocaine 1 % 1 mL (not administered)   lidocaine (LMX4) cream (not administered)   sodium chloride (PF) 0.9% PF flush 3 mL (not administered)   sodium chloride (PF) 0.9% PF flush 3 mL (not administered)       Drips infusing?:  No      ED NURSE PHONE NUMBER: *[id number]   "

## 2017-01-25 NOTE — PROGRESS NOTES
"Met with the patient and significant other briefly regarding observation brochure.  Sig. Other is very concerned because she has been informed by ED RN and others that Andie was \"inpatient status\" explained observation and attempted to give brochure but significant other would not take the brochure and stated \"put it in the file\".  Will talk to bedside RN and also discuss with hospitalist to see if we should be considering inpatient status? Will continue to follow.   "

## 2017-01-25 NOTE — PROVIDER NOTIFICATION
"Text page to hospitalist, \"FYI: Do you want us to hold all pt's PO meds tonight or would you like to switch any to IV? Please advise, thanks!\"  "

## 2017-01-25 NOTE — PLAN OF CARE
Problem: Goal Outcome Summary  Goal: Goal Outcome Summary  SLP: Bedside swallow eval completed per MD orders. Pt presents with severe oropharyngeal dysphagia. Pt with slight left facial droop, however oral musculature generally intact. Initial trials of thin liquids via cup resulted in suspect premature pharyngeal entry and overt s/sx of aspiration marked by coughing; pt tolerated 3-4 sips of thin liquids via cup with no overt s/sx of aspiration. Pureed textures via spoon x1 resulted in overt s/sx of aspiration marked by coughing and pt gasping for air. No further PO trials appropriate d/t high risk for aspiration. Recommend NPO. Pt OK to have a small amount of ice chips for pleasure. Please complete thorough oral cares. ST to continue to follow for PO readiness.

## 2017-01-25 NOTE — ED NOTES
Began feeling ill Saturday with cough and fever. Since Monday has had left side facial numbness, weakness of left arm and left leg.

## 2017-01-25 NOTE — ED NOTES
Bed: ED02  Expected date:   Expected time:   Means of arrival:   Comments:  Isabel Karimi F CVA weakness eta [id number]

## 2017-01-25 NOTE — PLAN OF CARE
Problem: Goal Outcome Summary  Goal: Goal Outcome Summary  Outcome: Improving  Pt arrived to unit at 1200 from ED for stroke eval. All imaging is negative. NIH score was 7. Pt is A&O. Neuros L filed cut, L facial droop, L cheek numbness, LUE and LLE hemiparesis/numbness. VSS. Tele NSR. Failed swallow eval, NPO-speech to reevaluate in AM. Up with assist of 2. C/o of headache pain. Plan for OT/PT to see pt and MRI of cervical spine.  1900 Addendum: Pt's weakness and numbness in LUE resolved, all other neuros remain the same. Pt sleeping soundly since 1600.

## 2017-01-25 NOTE — ED NOTES
MRI called, patient will go directly to 7th floor from MRI and visitor will wait in 7th [facility].

## 2017-01-26 ENCOUNTER — APPOINTMENT (OUTPATIENT)
Dept: SPEECH THERAPY | Facility: CLINIC | Age: 52
End: 2017-01-26
Attending: PHYSICIAN ASSISTANT
Payer: COMMERCIAL

## 2017-01-26 ENCOUNTER — APPOINTMENT (OUTPATIENT)
Dept: PHYSICAL THERAPY | Facility: CLINIC | Age: 52
End: 2017-01-26
Attending: PHYSICIAN ASSISTANT
Payer: COMMERCIAL

## 2017-01-26 ENCOUNTER — APPOINTMENT (OUTPATIENT)
Dept: OCCUPATIONAL THERAPY | Facility: CLINIC | Age: 52
End: 2017-01-26
Attending: PHYSICIAN ASSISTANT
Payer: COMMERCIAL

## 2017-01-26 VITALS
BODY MASS INDEX: 24.63 KG/M2 | HEIGHT: 63 IN | SYSTOLIC BLOOD PRESSURE: 107 MMHG | OXYGEN SATURATION: 94 % | RESPIRATION RATE: 16 BRPM | WEIGHT: 139 LBS | DIASTOLIC BLOOD PRESSURE: 61 MMHG | TEMPERATURE: 98 F

## 2017-01-26 LAB
ANION GAP SERPL CALCULATED.3IONS-SCNC: 7 MMOL/L (ref 3–14)
BUN SERPL-MCNC: 11 MG/DL (ref 7–30)
CALCIUM SERPL-MCNC: 8.4 MG/DL (ref 8.5–10.1)
CHLORIDE SERPL-SCNC: 107 MMOL/L (ref 94–109)
CO2 SERPL-SCNC: 26 MMOL/L (ref 20–32)
CREAT SERPL-MCNC: 0.6 MG/DL (ref 0.52–1.04)
ERYTHROCYTE [DISTWIDTH] IN BLOOD BY AUTOMATED COUNT: 12.2 % (ref 10–15)
GFR SERPL CREATININE-BSD FRML MDRD: ABNORMAL ML/MIN/1.7M2
GLUCOSE BLDC GLUCOMTR-MCNC: 109 MG/DL (ref 70–99)
GLUCOSE BLDC GLUCOMTR-MCNC: 91 MG/DL (ref 70–99)
GLUCOSE SERPL-MCNC: 101 MG/DL (ref 70–99)
HCT VFR BLD AUTO: 34.5 % (ref 35–47)
HGB BLD-MCNC: 11.8 G/DL (ref 11.7–15.7)
MCH RBC QN AUTO: 32 PG (ref 26.5–33)
MCHC RBC AUTO-ENTMCNC: 34.2 G/DL (ref 31.5–36.5)
MCV RBC AUTO: 94 FL (ref 78–100)
PLATELET # BLD AUTO: 178 10E9/L (ref 150–450)
POTASSIUM SERPL-SCNC: 3.9 MMOL/L (ref 3.4–5.3)
RBC # BLD AUTO: 3.69 10E12/L (ref 3.8–5.2)
SODIUM SERPL-SCNC: 140 MMOL/L (ref 133–144)
WBC # BLD AUTO: 2.5 10E9/L (ref 4–11)

## 2017-01-26 PROCEDURE — 97535 SELF CARE MNGMENT TRAINING: CPT | Mod: GO

## 2017-01-26 PROCEDURE — 40000193 ZZH STATISTIC PT WARD VISIT: Performed by: PHYSICAL THERAPIST

## 2017-01-26 PROCEDURE — G0378 HOSPITAL OBSERVATION PER HR: HCPCS

## 2017-01-26 PROCEDURE — 36415 COLL VENOUS BLD VENIPUNCTURE: CPT | Performed by: PHYSICIAN ASSISTANT

## 2017-01-26 PROCEDURE — 40000133 ZZH STATISTIC OT WARD VISIT

## 2017-01-26 PROCEDURE — 92526 ORAL FUNCTION THERAPY: CPT | Mod: GN | Performed by: SPEECH-LANGUAGE PATHOLOGIST

## 2017-01-26 PROCEDURE — 25000125 ZZHC RX 250: Performed by: INTERNAL MEDICINE

## 2017-01-26 PROCEDURE — 97161 PT EVAL LOW COMPLEX 20 MIN: CPT | Mod: GP | Performed by: PHYSICAL THERAPIST

## 2017-01-26 PROCEDURE — 40000225 ZZH STATISTIC SLP WARD VISIT: Performed by: SPEECH-LANGUAGE PATHOLOGIST

## 2017-01-26 PROCEDURE — 97166 OT EVAL MOD COMPLEX 45 MIN: CPT | Mod: GO

## 2017-01-26 PROCEDURE — 99217 ZZC OBSERVATION CARE DISCHARGE: CPT | Performed by: INTERNAL MEDICINE

## 2017-01-26 PROCEDURE — 80048 BASIC METABOLIC PNL TOTAL CA: CPT | Performed by: PHYSICIAN ASSISTANT

## 2017-01-26 PROCEDURE — 00000146 ZZHCL STATISTIC GLUCOSE BY METER IP

## 2017-01-26 PROCEDURE — 85027 COMPLETE CBC AUTOMATED: CPT | Performed by: PHYSICIAN ASSISTANT

## 2017-01-26 RX ADMIN — DEXTROSE AND SODIUM CHLORIDE: 5; 900 INJECTION, SOLUTION INTRAVENOUS at 14:06

## 2017-01-26 ASSESSMENT — VISUAL ACUITY
OU: NORMAL ACUITY

## 2017-01-26 NOTE — PROVIDER NOTIFICATION
"Text page to Dr. Alvarenga, \"FYI: neuro ok'd pt to d/c. PT and OT recommending OP PT and OP OT. Thanks!\"  "

## 2017-01-26 NOTE — PROGRESS NOTES
01/26/17 1000   Quick Adds   Type of Visit Initial PT Evaluation   Living Environment   Lives With spouse   Living Arrangements house   Home Accessibility stairs to enter home;stairs within home;tub/shower is not walk in   Number of Stairs to Enter Home 13  (7 with 1 rail, 6 with 2 rails)   Number of Stairs Within Home 33  (20 with 1 rail to up to bed/bath, 13 down 1 rail to living )   Transportation Available car;family or friend will provide  (pt drives, family can assist if needed)   Self-Care   Dominant Hand right   Usual Activity Tolerance excellent   Current Activity Tolerance moderate   Regular Exercise no   Equipment Currently Used at Home none   Activity/Exercise/Self-Care Comment Working full time, on her feet all day   Functional Level Prior   Ambulation 0-->independent   Transferring 0-->independent   Toileting 0-->independent   Bathing 0-->independent   Dressing 0-->independent   Eating 0-->independent   Communication 0-->understands/communicates without difficulty   Swallowing 0-->swallows foods/liquids without difficulty   Cognition 0 - no cognition issues reported   Fall history within last six months yes   Number of times patient has fallen within last six months 1   Which of the above functional risks had a recent onset or change? 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    General Information   Onset of Illness/Injury or Date of Surgery - Date 01/25/17   Referring Physician Severo James PA   Patient/Family Goals Statement To go home   Pertinent History of Current Problem (include personal factors and/or comorbidities that impact the POC) Patient admitted wtih possible TIA. Workup thus far negative   Precautions/Limitations fall precautions   General Observations Resting in bed, IV   General Info Comments Activity: Up ad miguel angel   Cognitive  "Status Examination   Orientation orientation to person, place and time   Level of Consciousness alert   Follows Commands and Answers Questions 100% of the time;able to follow multistep instructions   Personal Safety and Judgment intact   Pain Assessment   Patient Currently in Pain (HA)   Integumentary/Edema   Integumentary/Edema no deficits were identifed   Posture    Posture Forward head position   Range of Motion (ROM)   ROM Comment WNL as observed through AROM   Strength   Strength Comments Bilateral LEs grossly 4+/5 symmetric except L hip flexors 4-/5; seated MMT   Bed Mobility   Bed Mobility Comments Indep supine<>sit   Transfer Skills   Transfer Comments sit<>stand with SBA   Gait   Gait Comments ambulates 300 feet with SBA, ascend/descend 9 stairs with 1 rail and SBA. At times, pt appears slightly unsteady with variable step length and lateral balance checks but no overt LOB or physical assist needed; pt reports she has bilateral heel pain at baseline R>L that \"makes me walk funny\" if she is not wearing her shoes and heel cups, pt does not have her shoes here today.   Balance   Balance Comments Negative Rhomberg, may have higher level balance deficits observed with gait but heel pain may also be contributing to balance   Sensory Examination   Sensory Perception Comments States she has had both numbness and tingling on/off for past 3 months in LLE; states it is increased when she wakes up in the morning and after sitting for a long time   Coordination   Coordination Comments Intact with alternating foot/hand tapping as well as heel to shin and finger to nose bilaterally   Muscle Tone   Muscle Tone no deficits were identified   Clinical Impression   Criteria for Skilled Therapeutic Intervention evaluation only   Therapy Frequency` (eval only)   Anticipated Discharge Disposition Home with Outpatient Therapy;Home with Assist   Risk & Benefits of therapy have been explained Yes   Patient, Family & other staff in " "agreement with plan of care Yes   [facility] TM \"6 Clicks\"   2016, Trustees of [facility], under license to [employer].  All rights reserved.   6 Clicks Short Forms Basic Mobility Inpatient Short Form   [facility]  \"6 Clicks\" V.2 Basic Mobility Inpatient Short Form   1. Turning from your back to your side while in a flat bed without using bedrails? 4 - None   2. Moving from lying on your back to sitting on the side of a flat bed without using bedrails? 4 - None   3. Moving to and from a bed to a chair (including a wheelchair)? 3 - A Little   4. Standing up from a chair using your arms (e.g., wheelchair, or bedside chair)? 3 - A Little   5. To walk in hospital room? 3 - A Little   6. Climbing 3-5 steps with a railing? 3 - A Little   Basic Mobility Raw Score (Score out of 24.Lower scores equate to lower levels of function) 20   Total Evaluation Time   Total Evaluation Time (Minutes) 25     "

## 2017-01-26 NOTE — PLAN OF CARE
"Problem: Goal Outcome Summary  Goal: Goal Outcome Summary  PT: PT orders received, evaluation completed. Patient is a 50yo female admitted under observation with possible TIA. At baseline, pt lives with spouse in a house with 13 steps to enter, 20 steps up to bed/bath with tub/shower, and 13 down to main living area; all with 1-2 railings. Patient reports independence with functional mobility at baseline without AD, including working full time. Pt reports 1 fall in past 6 months where she fell down stairs, does not recall cause of fall but does state she \"blacked out\". Also reports intermittent numbness/tingling of LLE over past 3 months that is increased when she wakes up and after sitting for long periods of time. Patient currently denies pain except for HA, but thinks it may be d/t hunger. Denies N/T. Presents with equal LE strength 4+/5 symmetric bilaterally except L hip flexors 4-/5. Patient performs bed mobility independently, transfers without AD with SBA, ambulates 300 feet with SBA, ascend/descend 9 stairs with 1 rail and SBA. At times, pt appears slightly unsteady with variable step length and lateral balance checks but no overt LOB or physical assist needed; pt reports she has bilateral heel pain at baseline R>L that \"makes me walk funny\" if she is not wearing her shoes and heel cups, pt does not have her shoes here today. No further IP PT needs identified. Recommend patient discharge home with increased assist from spouse/family and OPPT for higher level balance. Order complete.         "

## 2017-01-26 NOTE — PROGRESS NOTES
"[facility]  [facility]  Neurology Daily Note     10/6/11 2:09 PM        Assessment and Plan:       Pt. Presents with L-sided weakness and subjective sensory loss.  Neurological work up in unrevealing.    I do not rec additional neurological work up.    Pt. Feels that she has L shoulder pain and she lifts  Heavy trays at work. He would like to have lifting restrictions.    I will leave up tp Hospitalist service to address lifting restrictions.                Interval History:   Pt.  Has unchanged symptoms. C-spine MRI shows DJD but no spinal stanosis           Review of Systems:   No new changes.          Medications:          clonazePAM  0.5 mg Oral At Bedtime     docusate sodium  100 mg Oral At Bedtime     traZODone  200 mg Oral At Bedtime     sodium chloride (PF)  3 mL Intracatheter Q8H     aspirin EC  81 mg Oral Daily    Or     aspirin  81 mg Oral or NG Tube Daily     gabapentin  900 mg Oral At Bedtime     divalproex  500 mg Oral At Bedtime          Physical Exam:   Vitals: Blood pressure 103/71, temperature 97.7  F (36.5  C), temperature source Oral, resp. rate 16, height 1.6 m (5' 3\"), weight 63.05 kg (139 lb), last menstrual period 05/29/2008, SpO2 96 %, not currently breastfeeding.   Full exam is not done.             Data:     Results for orders placed or performed during the hospital encounter of 01/25/17 (from the past 24 hour(s))   MR Cervical Spine w/o & w Contrast    Narrative    MR CERVICAL SPINE WITHOUT AND WITH CONTRAST  1/25/2017 10:29 PM    HISTORY:  Left upper extremity and left lower extremity weakness.  Prior breast cancer. Stroke symptoms.    COMPARISON: MR brain 1/25/2017.    TECHNIQUE: MR cervical spine without and with 6 mL Gadavist.    FINDINGS: Vertebral body bone marrow signal is normal, and the  alignment is normal through T3. Cervical and upper thoracic spinal  cord appear normal. There is no bony evidence for malignancy.  Craniocervical junction " region is normal. Paraspinous soft tissues are  normal.    Findings by level as follows:    C2-C3: Negative. No disc protrusion. No central or lateral stenosis.    C3-C4: Negative. No disc protrusion. No central or lateral stenosis.    C4-C5: Minimal disc space narrowing. No disc protrusion. No central or  lateral stenosis. Minimal facet joint disease bilaterally.    C5-C6: Small broad-based disc osteophyte complex. No significant  central stenosis. Small bilateral uncinate spurs with moderate  foraminal stenosis on the right and minimal foraminal stenosis on the  left.    C6-C7: Minimal annular bulge. No central or lateral stenosis.    C7-T1: Negative.    No pathologic enhancement with gadolinium.      Impression    IMPRESSION:  1. No evidence for malignancy.  2. Degenerative changes as described, most marked at C5-C6 where there  is mild central stenosis, moderate right-sided foraminal stenosis and  mild left-sided foraminal stenosis.  3. No cord abnormality through T3.    BEATRICE PARSONS MD   Glucose by meter   Result Value Ref Range    Glucose 91 70 - 99 mg/dL   Glucose by meter   Result Value Ref Range    Glucose 109 (H) 70 - 99 mg/dL   Basic metabolic panel   Result Value Ref Range    Sodium 140 133 - 144 mmol/L    Potassium 3.9 3.4 - 5.3 mmol/L    Chloride 107 94 - 109 mmol/L    Carbon Dioxide 26 20 - 32 mmol/L    Anion Gap 7 3 - 14 mmol/L    Glucose 101 (H) 70 - 99 mg/dL    Urea Nitrogen 11 7 - 30 mg/dL    Creatinine 0.60 0.52 - 1.04 mg/dL    GFR Estimate >90  Non  GFR Calc   >60 mL/min/1.7m2    GFR Estimate If Black >90   GFR Calc   >60 mL/min/1.7m2    Calcium 8.4 (L) 8.5 - 10.1 mg/dL   CBC with platelets   Result Value Ref Range    WBC 2.5 (L) 4.0 - 11.0 10e9/L    RBC Count 3.69 (L) 3.8 - 5.2 10e12/L    Hemoglobin 11.8 11.7 - 15.7 g/dL    Hematocrit 34.5 (L) 35.0 - 47.0 %    MCV 94 78 - 100 fl    MCH 32.0 26.5 - 33.0 pg    MCHC 34.2 31.5 - 36.5 g/dL    RDW 12.2 10.0 - 15.0  %    Platelet Count 178 150 - 450 10e9/L

## 2017-01-26 NOTE — PLAN OF CARE
Problem: Goal Outcome Summary  Goal: Goal Outcome Summary  SLP: Patient was seen for swallow treatment. She was given trials of thin liquids and tolerated without overt Sx of aspiration. Swallow response was timely with good laryngeal elevation. Swallow function was intact for pureed texures. She has missing molars so mastication of solids were slow but sufficient with good oral clearing. Recommend: 1. Regular diet with thin liquids. 2. Upright, no straws small bites/sips, alternate liquids/solids. 3. No further skilled services needed at this time. If any further difficulty with swallowing then f/u with an OP video swallow. 4. Discharge to home when stable with assistance from her wife.

## 2017-01-26 NOTE — PHARMACY-ADMISSION MEDICATION HISTORY
Admission medication history interview status for the 1/25/2017  admission is complete. See EPIC admission navigator for prior to admission medications     Medication history source reliability:Moderate    Actions taken by pharmacist (provider contacted, etc):Had spouse confirms meds from bottles at home     Additional medication history information not noted on PTA med list :Pt had a RX for Sonata filled 1/5/17 but has never taken any.    Medication reconciliation/reorder completed by provider prior to medication history? Yes    Time spent in this activity: 60 min    Prior to Admission medications    Medication Sig Last Dose Taking? Auth Provider   divalproex (DEPAKOTE ER) 500 MG 24 hr tablet Take 500 mg by mouth At Bedtime 1/24/2017 at hs Yes Unknown, Entered By History   GABAPENTIN PO Take 900 mg by mouth At Bedtime 1/24/2017 at hs Yes Unknown, Entered By History   traZODone (DESYREL) 100 MG tablet Take 2 tablets (200 mg) by mouth At Bedtime 1/24/2017 at hs Yes Cande Gilliland APRN CNP   OLANZapine (ZYPREXA) 10 MG tablet Take 1 tablet (10 mg) by mouth daily as needed (agitation)   Cande Gilliland APRN CNP   clonazePAM (KLONOPIN) 0.5 MG tablet Take 1 tablet (0.5 mg) by mouth At Bedtime   Cande Gilliland APRN CNP   docusate sodium (COLACE) 100 MG capsule Take 1 capsule (100 mg) by mouth At Bedtime   Cande Gilliland APRN CNP

## 2017-01-26 NOTE — DISCHARGE INSTRUCTIONS
Follow-up with the [facility] with Dr. Hector with any seizure concerns. Call [phone] to make an appointment.   Address: [address] [place] [address] [address], [place] [address]

## 2017-01-26 NOTE — PROGRESS NOTES
" 01/26/17 0851   Quick Adds   Type of Visit Initial Occupational Therapy Evaluation   Living Environment   Lives With spouse   Living Arrangements house   Home Accessibility stairs to enter home;stairs within home;tub/shower is not walk in   Number of Stairs to Enter Home 13   Number of Stairs Within Home 20   Transportation Available car;family or friend will provide  (Pt still drives; however, family can provide for now.)   Self-Care   Dominant Hand right   Usual Activity Tolerance excellent   Current Activity Tolerance moderate   Equipment Currently Used at Home none   Functional Level Prior   Ambulation 0-->independent   Transferring 0-->independent   Toileting 0-->independent   Bathing 0-->independent   Dressing 0-->independent   Eating 0-->independent   Communication 0-->understands/communicates without difficulty   Swallowing 0-->swallows foods/liquids without difficulty   Cognition 0 - no cognition issues reported   Fall history within last six months yes   Number of times patient has fallen within last six months 1   General Information   Onset of Illness/Injury or Date of Surgery - Date 01/25/17   Referring Physician ZEINA James PADianaC   Patient/Family Goals Statement Pt plans to discharge home today    Additional Occupational Profile Info/Pertinent History of Current Problem Admitted under observation for L sided weakness due to possible TIA. Imaging negative for acute findings.    Precautions/Limitations fall precautions   Cognitive Status Examination   Orientation orientation to person, place and time   Level of Consciousness alert   Able to Follow Commands WNL/WFL   Personal Safety (Cognitive) at risk behaviors demonstrated   Memory (Defer to OP OT)   Visual Perception   Visual Perception Wears glasses   Visual Perception Comments No blurred vision at eval. Per pt, \"Occasionally blurred vision through L eye and it [place] alot.\"    Sensory Examination   Sensory Comments No B UE numbness and tingling " "reported at eval; however, per pt, \"The left side occasionally gets numb.\"    Pain Assessment   Patient Currently in Pain No   Range of Motion (ROM)   ROM Quick Adds No deficits were identified   ROM Comment B UE WNL   Strength   Manual Muscle Testing Quick Adds No deficits were identified   Strength Comments B UE 5/5 on MMT   Coordination   Coordination Comments L grasp slightly weaker than R   Mobility   Bed Mobility Bed mobility skill: Rolling/Turning;Bed mobility skill: Scooting/Bridging;Bed mobility skill: Supine to sit;Bed mobility skill: Sit to supine;Bed mobility analysis   Bed Mobility Skill: Rolling/Turning   Level of [place] - Bed Mobility Skill Rolling Turning stand-by assist   Bed Mobility Skill: Scooting/Bridging   Level of [place]: Scooting/Bridging stand-by assist   Bed Mobility Skill: Sit to Supine   Level of [place]: Sit/Supine stand-by assist   Bed Mobility Skill: Supine to Sit   Level of [place]: Supine/Sit stand-by assist   Bed Mobility Analysis   Impairments Contributing to Impaired Bed Mobility pain   Transfer Skills   Transfer Transfer Safety Analysis Bed/Chair;Transfer Skill: Stand to Sit;Transfer Safety Analysis Sit/Stand   Transfer Skill: Bed to Chair/Chair to Bed   Level of [place]: Bed to Chair minimum assist (75% patients effort)   Transfer Safety Analysis Bed/Chair   Transfer Safety Concerns Noted decreased balance during turns;losing balance backward   Impairments Contributing to Impaired Transfers impaired balance;pain   Transfer Skill: Sit to Stand   Level of [place]: Sit/Stand minimum assist (75% patients effort)   Transfer Safety Analysis Sit/Stand   Transfer Safety Concerns Noted: Sit/Stand decreased balance during turns;losing balance backward   Impaired Transfers: Sit/Stand impaired balance;pain   Transfer Skill: Toilet Transfer   Level of [place]: Toilet minimum assist (75% patients effort)   Upper Body Dressing   Level of [place]: Dress " "Upper Body independent   Lower Body Dressing   Level of [place]: Dress Lower Body stand-by assist   Toileting   Level of [place]: Toilet independent   Grooming   Level of [place]: Grooming independent   Eating/Self Feeding   Level of [place]: Eating independent   Instrumental Activities of Daily Living (IADL)   Previous Responsibilities meal prep;laundry;work;driving;finances;medication management   Activities of Daily Living Analysis   Impairments Contributing to Impaired Activities of Daily Living balance impaired;pain   General Therapy Interventions   Planned Therapy Interventions ADL retraining;transfer training   Clinical Impression   Criteria for Skilled Therapeutic Interventions Met yes, treatment indicated   OT Diagnosis Decreased I and safety with ADLs   Influenced by the following impairments Decreased balance; Impaired safety; Pain   Assessment of Occupational Performance 3-5 Performance Deficits   Identified Performance Deficits Decreased balance; Impaired safety; Pain   Clinical Decision Making (Complexity) Moderate complexity   Predicted Duration of Therapy Intervention (days/wks) Eval and 1 Tx only   Anticipated Discharge Disposition Home with Assist;Home with Outpatient Therapy   Risks and Benefits of Treatment have been explained. Yes   Patient, Family & other staff in agreement with plan of care Yes   [facility] [employer] TM \"6 Clicks\"   2016, Trustees of [facility], under license to [employer].  All rights reserved.   6 Clicks Short Forms Daily Activity Inpatient Short Form   [facility] AM-PAC  \"6 Clicks\" Daily Activity Inpatient Short Form   1. Putting on and taking off regular lower body clothing? 3 - A Little   2. Bathing (including washing, rinsing, drying)? 3 - A Little   3. Toileting, which includes using toilet, bedpan or urinal? 3 - A Little   4. Putting on and taking off regular upper body clothing? 4 - None   5. Taking care of personal grooming " such as brushing teeth? 4 - None   6. Eating meals? 4 - None   Daily Activity Raw Score (Score out of 24.Lower scores equate to lower levels of function) 21   Total Evaluation Time   Total Evaluation Time (Minutes) 10

## 2017-01-26 NOTE — DISCHARGE SUMMARY
[facility]    Discharge Summary  Hospitalist    Date of Admission:  1/25/2017  Date of Discharge:  1/26/2017  Discharging Provider: Elton Alvarenga MD  Date of Service (when I saw the patient): 01/26/2017    Discharge Diagnoses  L sided weakness  Bipolar disorder, type 2  Hyperlipidemia  GERD    History of Present Illness  Ms. Ahsan Elkins is a 50 y/o female with a medical history remarkable for Bipolar disorder, type 2, hyperlipidemia and GERD who presented with L sided weakness.     Hospital Course  Andie Elkins was admitted on 1/25/2017.  The following problems were addressed during her hospitalization:    L sided weakness  Mrs. Ahsan Elkins presented to the hospital with L sided weakness as well as L sided facial numbness. Her symptoms had been present intermittently for the month prior to admission. She had symptoms starting the day prior to admission and persistent which prompted her presentation to the ED. Evaluation by neurology did not find any neurologic cause/ source for her weakness/ numbness. MRI/A and MRI C spine did not reveal any causative pathology. At the time of discharge she had largely returned to her baseline. She was also seen by Physical and Occupational therapy and was recommended for outpatient therapies, which will be arranged. She should follow up with her primary MD in 2 weeks, follow up with neurology as needed.     Bipolar disorder, type 2  Resume prior to admission medications.     Hyperlipidemia  Doesn't appear to be on treatment for this at home, defer management to primary MD.    Elton Alvarenga M.D.  Hospitalist  Pager [phone]    Significant Results and Procedures  MRI/A of brain/ neck/ cervical spine  CT head without contrast    Pending Results    Code Status  Full Code       Primary Care Physician  Christina Izaguirre    Physical Exam  Temp: 98  F (36.7  C) Temp src: Oral BP: 107/61 mmHg   Heart Rate: 62 Resp: 16 SpO2: 94 % O2 Device: None  (Room air)    Filed Vitals:    01/25/17 0824   Weight: 63.05 kg (139 lb)     Vital Signs with Ranges  Temp:  [97.4  F (36.3  C)-98  F (36.7  C)] 98  F (36.7  C)  Heart Rate:  [51-65] 62  Resp:  [16] 16  BP: ()/(57-71) 107/61 mmHg  SpO2:  [94 %-99 %] 94 %  I/O last 3 completed shifts:  In: 2433 [P.O.:360; I.V.:2073]  Out: -     Constitutional: Alert, oriented, no acute distress  Respiratory: Lungs clear to auscultation bilaterally, no wheezes, no crackles  Cardiovascular: Regular rate and rhythm, no murmurs  GI: Soft, non-tender, non-disteneded, good bowel sounds  Skin/Integumen: No erythema, cyanosis or edema  Other:      Discharge Disposition  Discharged to home  Condition at discharge: Stable    Consultations This Hospital Stay  NEUROLOGY IP CONSULT  PHYSICAL THERAPY ADULT IP CONSULT  OCCUPATIONAL THERAPY ADULT IP CONSULT  SPEECH LANGUAGE PATH ADULT IP CONSULT  SWALLOW EVAL SPEECH PATH AT BEDSIDE IP CONSULT  SMOKING CESSATION PROGRAM IP CONSULT    Time Spent on This Encounter  I, Elton Alvarenga, personally saw the patient today and spent less than or equal to 30 minutes discharging this patient.    Discharge Orders    Physical Therapy Referral     Occupational Therapy Referral     Reason for your hospital stay   You were hospitalized secondary to L sided weakness concerning for a stroke. The evaluation was negative and there was no evidence of a stroke.     Follow-up and recommended labs and tests   Follow up with primary care provider, Christina Izaguirre, within 14 days for hospital follow- up.  The following labs/tests are recommended: CBC.     Activity   Your activity upon discharge: activity as tolerated     When to contact your care team   Call your primary doctor if you have any of the following: new or worsening numbness or weakness.     Full Code     Diet   Follow this diet upon discharge: Orders Placed This Encounter  Room Service  Regular Diet Adult         Discharge Medications  Current Discharge  Medication List      CONTINUE these medications which have NOT CHANGED    Details   divalproex (DEPAKOTE ER) 500 MG 24 hr tablet Take 500 mg by mouth At Bedtime      GABAPENTIN PO Take 900 mg by mouth At Bedtime      traZODone (DESYREL) 100 MG tablet Take 2 tablets (200 mg) by mouth At Bedtime      OLANZapine (ZYPREXA) 10 MG tablet Take 1 tablet (10 mg) by mouth daily as needed (agitation)  Qty: 10 tablet, Refills: 0    Associated Diagnoses: Bipolar II disorder, most recent episode major depressive (H)      clonazePAM (KLONOPIN) 0.5 MG tablet Take 1 tablet (0.5 mg) by mouth At Bedtime      docusate sodium (COLACE) 100 MG capsule Take 1 capsule (100 mg) by mouth At Bedtime           Allergies  Allergies   Allergen Reactions     Codeine      Penicillins      Data  Most Recent 3 CBC's:  Recent Labs   Lab Test  01/26/17   0750  01/25/17   0845  06/20/16   1602   WBC  2.5*  4.2  3.4*   HGB  11.8  11.8  12.5   MCV  94  95  96   PLT  178  196  216      Most Recent 3 BMP's:  Recent Labs   Lab Test  01/26/17   0750  01/25/17   0845  12/12/16   1549   NA  140  140  141   POTASSIUM  3.9  4.1  4.1   CHLORIDE  107  106  103   CO2  26  28  31   BUN  11  19  16   CR  0.60  0.67  0.69   ANIONGAP  7  6  7   CAMMIE  8.4*  8.8  9.0   GLC  101*  95  75     Most Recent 2 LFT's:  Recent Labs   Lab Test  06/20/16   1602  06/01/16   1608   AST  19  21   ALT  24  24   ALKPHOS  55  61   BILITOTAL  0.4  0.3     Most Recent INR's and Anticoagulation Dosing History:        Anticoagulation Dose History     Recent Dosing and Labs Latest Ref Rng 8/28/2007 6/28/2009 9/4/2013    INR 0.86 - 1.14 1.03 0.95 0.94        Most Recent 3 Troponin's:  Recent Labs   Lab Test  09/04/13   1802  09/04/13   1556  07/03/12   0600   06/28/09   0115   TROPI  <0.012  <0.012  <0.012   < >  <0.012   TROPONIN   --    --    --    --   0.00    < > = values in this interval not displayed.     Most Recent Cholesterol Panel:  Recent Labs   Lab Test  06/20/16   1602   CHOL  192    LDL  113*   HDL  48*   TRIG  157*     Most Recent 6 Bacteria Isolates From Any Culture (See EPIC Reports for Culture Details):  Recent Labs   Lab Test  06/01/16   1615  03/08/16   1444  01/08/14   0952   CULT  50,000 to 100,000 colonies/mL mixed urogenital alisia  50,000 to 100,000 colonies/mL Klebsiella pneumoniae*  <10,000 colonies/mL Corynebacterium species Susceptibility testing not routinely done*     Most Recent TSH, T4 and A1c Labs:  Recent Labs   Lab Test  12/12/16   1549   TSH  1.64   A1C  5.5     Results for orders placed or performed during the hospital encounter of 01/25/17   XR Chest 2 Views    Narrative    XR CHEST 2 VW 1/25/2017 8:59 AM    HISTORY: Short of breath.    COMPARISON: 7/2/2012    FINDINGS: No airspace consolidation, pleural effusion or pneumothorax.  Normal heart size.      Impression    IMPRESSION: No acute cardiopulmonary abnormality.    REINA RODRÍGUEZ MD   CT Head w/o Contrast    Narrative    CT SCAN OF THE HEAD WITHOUT CONTRAST   1/25/2017 9:11 AM     HISTORY: fever, cold symptoms since Saturday, left sided stroke  symptoms since Monday, also left facial pain with fever/chills    TECHNIQUE:  Axial images of the head and coronal reformations without  IV contrast material. Radiation dose for this scan was reduced using  automated exposure control, adjustment of the mA and/or kV according  to patient size, or iterative reconstruction technique.    COMPARISON: None.    FINDINGS:  The ventricles are normal in size, shape and configuration.   The brain parenchyma and subarachnoid spaces are normal. There is no  evidence of intracranial hemorrhage, mass, acute infarct or anomaly.     The visualized portions of the sinuses and mastoids appear normal.  There is no evidence of trauma.      Impression    IMPRESSION: Normal CT scan of the head.      JEREMY PEACE MD   MR Brain w/o & w Contrast    Narrative    MRI BRAIN WITHOUT AND WITH CONTRAST  1/25/2017 11:29 AM    HISTORY:  stroke, left facial  numbness, left arm/leg weakness, breast  cancer with radiation     TECHNIQUE:  Multiplanar, multisequence MRI of the brain without and  with 10 mL Gadavist    COMPARISON: None.    FINDINGS:  The brain parenchyma, ventricles and subarachnoid spaces  appear normal.  There is no evidence of hemorrhage, mass, acute  infarct, or anomaly.  There are no gadolinium enhancing lesions.    The facial structures appear normal. The arteries at the base of the  brain and the dural venous sinuses appear patent.       Impression    IMPRESSION: Normal MRI of the brain.      JEREMY PEACE MD   MR Head w/o Contrast Angiogram    Narrative    MR ANGIOGRAM OF THE HEAD WITHOUT CONTRAST  1/25/2017 10:55 AM     HISTORY: Stroke symptoms. Left facial numbness and left leg numbness  and weakness.    TECHNIQUE: 3D time-of-flight MR angiogram of the head without  contrast.    COMPARISON: None.    FINDINGS: There are possible stenoses in the carotid siphons versus  flow artifacts. There may also be mild stenoses in the middle cerebral  arteries in the M2 segments, although artifacts could also have this  appearance. No arterial occlusion is seen. No aneurysm is visible.      Impression    IMPRESSION: Question of intracranial arterial stenoses versus  artifacts. No evidence of arterial occlusion.      JEREMY PEACE MD   MR Neck w/o & w Contrast Angiogram    Narrative    MRA NECK WITHOUT AND WITH CONTRAST  1/25/2017 11:38 AM     HISTORY: Left facial numbness and left leg numbness and weakness.  History of breast cancer. Stroke symptoms.    TECHNIQUE: 2D time-of-flight MR angiogram of the neck without contrast  and 3D MR angiogram of the neck with  10 mL Gadavist. Estimates of  carotid stenoses are made relative to the distal internal carotid  artery diameters except as noted.    COMPARISON: None.    FINDINGS:    Right Carotid: Tortuous internal carotid artery. Suspect fibromuscular  dysplasia in the mid cervical internal carotid. No aneurysm  or  stenosis.    Left Carotid:  Normal.    Vertebral and Basilar:  Somewhat beaded appearance of the vertebral  arteries in the V2 segments and right V3 segment suggestive of  fibromuscular dysplasia. No significant stenosis or aneurysm or  pseudoaneurysm.    Aortic Arch and Branches:  Normal.      Impression    IMPRESSION:    1. Possible fibromuscular dysplasia in the vertebral arteries in the  V2 segments and right V3 segment and in the cervical right internal  carotid artery.   2. Patent vessels.    JEREMY PEACE MD   MR Cervical Spine w/o & w Contrast    Narrative    MR CERVICAL SPINE WITHOUT AND WITH CONTRAST  1/25/2017 10:29 PM    HISTORY:  Left upper extremity and left lower extremity weakness.  Prior breast cancer. Stroke symptoms.    COMPARISON: MR brain 1/25/2017.    TECHNIQUE: MR cervical spine without and with 6 mL Gadavist.    FINDINGS: Vertebral body bone marrow signal is normal, and the  alignment is normal through T3. Cervical and upper thoracic spinal  cord appear normal. There is no bony evidence for malignancy.  Craniocervical junction region is normal. Paraspinous soft tissues are  normal.    Findings by level as follows:    C2-C3: Negative. No disc protrusion. No central or lateral stenosis.    C3-C4: Negative. No disc protrusion. No central or lateral stenosis.    C4-C5: Minimal disc space narrowing. No disc protrusion. No central or  lateral stenosis. Minimal facet joint disease bilaterally.    C5-C6: Small broad-based disc osteophyte complex. No significant  central stenosis. Small bilateral uncinate spurs with moderate  foraminal stenosis on the right and minimal foraminal stenosis on the  left.    C6-C7: Minimal annular bulge. No central or lateral stenosis.    C7-T1: Negative.    No pathologic enhancement with gadolinium.      Impression    IMPRESSION:  1. No evidence for malignancy.  2. Degenerative changes as described, most marked at C5-C6 where there  is mild central stenosis,  moderate right-sided foraminal stenosis and  mild left-sided foraminal stenosis.  3. No cord abnormality through T3.    BEATRICE PARSONS MD

## 2017-01-26 NOTE — PLAN OF CARE
Problem: Goal Outcome Summary  Goal: Goal Outcome Summary  OT: Eval and Tx complete. Pt admitted under observation for L sided weakness due to possible TIA. Prior to admit, pt lives in house with spouse and reports I with ADL/IADLs, including driving and full-time work. Currently, pt requires SBA for functional transfers and ADLs. Pt had 1 LOB during functional mobility requiring min A to correct. Pt in agreement with discharge recommendations of home with increased A from spouse and follow up with OP OT. OT signing off at this time.

## 2017-01-26 NOTE — PLAN OF CARE
Problem: Goal Outcome Summary  Goal: Goal Outcome Summary  Outcome: Improving  A&Ox4. Neuros: slight L droop, tingling on L side of face, and L side hemiparesis. VSS, RA. Tele SB. NPO pending speech eval. Up with A1-2. C/o headache, decreased with cold pack. Discharge pending, will continue to monitor.

## 2017-01-26 NOTE — PLAN OF CARE
Problem: Goal Outcome Summary  Goal: Goal Outcome Summary  Outcome: Completed Date Met:  01/26/17  A&O. Neuros L face numbness and +1 edema. Numbness and weakness in LUE and LLE resolved, no facial droop. VSS. Tele NSR. regular diet. Up with SBA. C/o of 4/10 headache, denies intervention. Plan for pt to d/c home with wife. F/u with PCP in 2 weeks. D/c instructions given to pt and wife, all questions answered. No new changes to medications.

## 2017-01-26 NOTE — PLAN OF CARE
Problem: Goal Outcome Summary  Goal: Goal Outcome Summary  Outcome: Improving  AxOx4. Neuros intact except moderate left hand  and left sided hemiparesis, left facial droop, left sided facial numbness that is improving. VSS. Tele SB. NPO. Up with two assist and gait belt. Denies pain. Discharge plan pending at this time.

## 2017-01-27 ENCOUNTER — TELEPHONE (OUTPATIENT)
Dept: FAMILY MEDICINE | Facility: CLINIC | Age: 52
End: 2017-01-27

## 2017-01-27 NOTE — TELEPHONE ENCOUNTER
ED / Discharge Outreach Protocol    Patient Contact    Attempt # 1    Was call answered?  No.  Left message on voicemail with information to call me back.      No follow up appointment scheduled.  Not on Warfarin  Reason for your hospital stay    You were hospitalized secondary to L sided weakness concerning for a stroke. The evaluation was negative and there was no evidence of a stroke.        Follow-up and recommended labs and tests    Follow up with primary care provider, Christina Izaguirre, within 14 days for hospital follow- up.  The following labs/tests are recommended: CBC.        Activity    Your activity upon discharge: activity as tolerated        When to contact your care team    Call your primary doctor if you have any of the following: new or worsening numbness or weakness.

## 2017-01-27 NOTE — TELEPHONE ENCOUNTER
Pt was discharged form [facility] on 1/26/17 after being treated for transient cerebral ischemia and L sided weakness. Please call the pt. Thank you.  Ginger Ngo,

## 2017-01-30 NOTE — TELEPHONE ENCOUNTER
Message handled by Nurse Triage with [facility] - provider name: Dr. Izaguirre - Advised follow up for patient in office today or at least with an opthomologist and if any further symtposm arrise to be seen in ER.     Non-detailed message left to return our call.    Deena Joe RN   [facility] [employer]

## 2017-01-30 NOTE — TELEPHONE ENCOUNTER
ED / Discharge Outreach Protocol    Patient Contact    Attempt # 2    Was call answered?  No.  Left message on voicemail with information to call me back.

## 2017-01-30 NOTE — TELEPHONE ENCOUNTER
Spoke with patient, advised she needs to be evaluated today. Patient refused appointment and states she cannot be seen sooner than her scheduled appointment tomorrow. Advised if she is having further symptoms to proceed to ED. Patient is agreeable.   Kelly Webster RN   [facility] - Triage

## 2017-01-30 NOTE — TELEPHONE ENCOUNTER
"  ED for acute condition Discharge Protocol    \"Hi, my name is Deena CRAWFORDNadine Lopez, a registered nurse, and I am calling from [facility].  I am calling to follow up and see how things are going for you after your recent emergency visit.\"    Tell me how you are doing now that you are home?\" States doing ok but left side still feels weird.  States last night took her pills for HS Depakote, trazodone (none were new) and woke up at 1:30 to go to bathroom and states saw black and lost balance and hit head.  Could not stand.  States still has blurred vision on left side.  Patient states she hit her head.  Denies any injuries, nausea, vomiting, any further black spots, denies any dizziness or light headedness,   States has little bit of headache, states does not recall the event.      Discharge Instructions    \"Let's review your discharge instructions.  What is/are the follow-up recommendations?  Pt. Response: Dr. Izaguirre - offered appointment today for symptoms.  Patient declined states needs after 2 tomorrow.  Appointment scheduled.    \"Has an appointment with your primary care provider been scheduled?\"  No (needed - schedule appointment and remind to bring meds)    Medications    \"Tell me what changed about your medicines when you discharged?\"    None    \"What questions do you have about your medications?\"   None        Call Summary    \"What questions or concerns do you have about your recent visit and your follow-up care?\"     none    \"If you have questions or things don't continue to improve, we encourage you contact us through the main clinic number (give number).  Even if the clinic is not open, triage nurses are available 24/7 to help you.     We would like you to know that our clinic has extended hours (provide information).  We also have urgent care (provide details on closest location and hours/contact info)\"    \"Thank you for your time and take care!\"                "

## 2017-01-31 ENCOUNTER — OFFICE VISIT (OUTPATIENT)
Dept: FAMILY MEDICINE | Facility: CLINIC | Age: 52
End: 2017-01-31
Payer: COMMERCIAL

## 2017-01-31 VITALS
TEMPERATURE: 97.5 F | HEIGHT: 63 IN | BODY MASS INDEX: 24.98 KG/M2 | DIASTOLIC BLOOD PRESSURE: 62 MMHG | WEIGHT: 141 LBS | SYSTOLIC BLOOD PRESSURE: 96 MMHG | HEART RATE: 88 BPM

## 2017-01-31 DIAGNOSIS — R20.0 NUMBNESS AND TINGLING OF LEFT SIDE OF FACE: Primary | ICD-10-CM

## 2017-01-31 DIAGNOSIS — G43.009 ATYPICAL MIGRAINE: ICD-10-CM

## 2017-01-31 DIAGNOSIS — G40.909 SEIZURE DISORDER (H): ICD-10-CM

## 2017-01-31 DIAGNOSIS — Z12.39 SCREENING FOR BREAST CANCER: ICD-10-CM

## 2017-01-31 DIAGNOSIS — R20.2 NUMBNESS AND TINGLING OF LEFT SIDE OF FACE: Primary | ICD-10-CM

## 2017-01-31 DIAGNOSIS — Z12.31 VISIT FOR SCREENING MAMMOGRAM: ICD-10-CM

## 2017-01-31 PROCEDURE — 99495 TRANSJ CARE MGMT MOD F2F 14D: CPT | Mod: 25 | Performed by: INTERNAL MEDICINE

## 2017-01-31 PROCEDURE — 96372 THER/PROPH/DIAG INJ SC/IM: CPT | Performed by: INTERNAL MEDICINE

## 2017-01-31 RX ORDER — SUMATRIPTAN 50 MG/1
50 TABLET, FILM COATED ORAL
Qty: 18 TABLET | Refills: 1 | Status: SHIPPED | OUTPATIENT
Start: 2017-01-31 | End: 2017-05-02

## 2017-01-31 RX ORDER — SUMATRIPTAN 6 MG/.5ML
6 INJECTION, SOLUTION SUBCUTANEOUS ONCE
Qty: 1 VIAL | Refills: 1
Start: 2017-01-31 | End: 2017-01-31

## 2017-01-31 ASSESSMENT — ANXIETY QUESTIONNAIRES
3. WORRYING TOO MUCH ABOUT DIFFERENT THINGS: NEARLY EVERY DAY
6. BECOMING EASILY ANNOYED OR IRRITABLE: NEARLY EVERY DAY
GAD7 TOTAL SCORE: 14
1. FEELING NERVOUS, ANXIOUS, OR ON EDGE: MORE THAN HALF THE DAYS
5. BEING SO RESTLESS THAT IT IS HARD TO SIT STILL: MORE THAN HALF THE DAYS
IF YOU CHECKED OFF ANY PROBLEMS ON THIS QUESTIONNAIRE, HOW DIFFICULT HAVE THESE PROBLEMS MADE IT FOR YOU TO DO YOUR WORK, TAKE CARE OF THINGS AT HOME, OR GET ALONG WITH OTHER PEOPLE: NOT DIFFICULT AT ALL
2. NOT BEING ABLE TO STOP OR CONTROL WORRYING: MORE THAN HALF THE DAYS
7. FEELING AFRAID AS IF SOMETHING AWFUL MIGHT HAPPEN: SEVERAL DAYS

## 2017-01-31 ASSESSMENT — PATIENT HEALTH QUESTIONNAIRE - PHQ9: 5. POOR APPETITE OR OVEREATING: SEVERAL DAYS

## 2017-01-31 NOTE — PROGRESS NOTES
SUBJECTIVE:                                                    Andie Elkins is a 51 year old female who presents to clinic today for the following health issues:          Hospital Follow-up Visit:    Hospital/Nursing Home/IP Rehab Facility: [facility]  Date of Admission: 01/25/2017  Date of Discharge: 01/27/2017  Reason(s) for Admission: Possible CVA, unknown             Problems taking medications regularly:  None       Medication changes since discharge: None       Problems adhering to non-medication therapy:  None    Summary of hospitalization:  [facility] discharge summary reviewed  Discharge summary not available  Diagnostic Tests/Treatments reviewed.  Follow up needed: MRI and CT done   Other Healthcare Providers Involved in Patient s Care:         Imaging  Update since discharge: stable, but c/o numbness and tenderness in the face    Post Discharge Medication Reconciliation: unable to reconcile discharge medications due to no med list.  Plan of care communicated with patient     Coding guidelines for this visit:  Type of Medical   Decision Making Face-to-Face Visit       within 7 Days of discharge Face-to-Face Visit        within 14 days of discharge   Moderate Complexity [id number]   High Complexity [id number]          Andie reports being sick for the past week with an upper respiratory infection. On Monday, January 22 nd she went home from work due to not feeling good. The next day she felt like the left side of her face was swollen. On the 24 th she called into clinic and told the nurse about numbness on the left side of her face so she was sent to the Emergency department. She had an MRI/MRA which was negative. Neurology was consulted and did not feel that she was having a stroke.     She is still feeling numbness underneath her left eye. She is having a runny nose but no fevers. Experiencing a left sided headache along with this.         Problem list and histories  reviewed & adjusted, as indicated.  Additional history: as documented    Patient Active Problem List   Diagnosis     CARDIOVASCULAR SCREENING; LDL GOAL LESS THAN 160     Lumbago     Hyperlipidemia LDL goal <160     Left shoulder pain     Bipolar II disorder, most recent episode major depressive (H)     Major depressive disorder, recurrent episode, mild (H)     Gastroesophageal reflux disease, esophagitis presence not specified     Nausea     Personal history of malignant neoplasm of breast     Suicidal ideation     TIA (transient ischemic attack)     Past Surgical History   Procedure Laterality Date     C anesth,lower arm surgery       reconstruction. fracture     Orthopedic surgery       Gyn surgery  endometriosis     Lumpectomy breast Left 2005       Social History   Substance Use Topics     Smoking status: Never Smoker      Smokeless tobacco: Never Used     Alcohol Use: 0.0 oz/week     0 Standard drinks or equivalent per week      Comment: occasional     Family History   Problem Relation Age of Onset     Lipids Mother      C.A.D. Father      CABGx4     C.A.D. Maternal Grandfather      open heart surgery     Hypertension Mother      Hypertension Maternal Grandmother      CEREBROVASCULAR DISEASE Mother      Circulatory Paternal Uncle      Blood clots     Neurologic Disorder Sister      Epilepsy     DIABETES Father      DIABETES Maternal Grandmother      DIABETES Maternal Grandfather      DIABETES Paternal Grandmother      DIABETES Paternal Grandfather      Breast Cancer Maternal Aunt      Breast Cancer Maternal Aunt      Breast Cancer Maternal Aunt      Breast Cancer Maternal Grandmother      CANCER Maternal Uncle      Throat     CANCER Maternal Uncle      Jaw     CANCER Maternal Grandfather      Depression Mother      Alcohol/Drug Father      Alcohol/Drug Maternal Grandfather      Alcohol/Drug Paternal Grandfather      Alcohol/Drug Brother      DIABETES Mother 73     HEART DISEASE Mother 73     heart mumur      "CANCER Father      lung cancer     CEREBROVASCULAR DISEASE Sister      Myocardial Infarction Brother 59     Dementia Father      Cancer - colorectal Maternal Uncle            ROS:  Constitutional, HEENT, cardiovascular, pulmonary, gi and gu systems are negative, except as otherwise noted.    OBJECTIVE:                                                    BP 96/62 mmHg  Pulse 88  Temp(Src) 97.5  F (36.4  C) (Tympanic)  Ht 5' 3\" (1.6 m)  Wt 141 lb (63.957 kg)  BMI 24.98 kg/m2  LMP 05/29/2008  Body mass index is 24.98 kg/(m^2).  GENERAL: healthy, alert and no distress  EYES: Eyes grossly normal to inspection, PERRL and conjunctivae and sclerae normal  NECK: no adenopathy, no asymmetry, masses, or scars and thyroid normal to palpation  RESP: lungs clear to auscultation - no rales, rhonchi or wheezes  CV: regular rate and rhythm, normal S1 S2, no S3 or S4, no murmur, click or rub, no peripheral edema and peripheral pulses strong  NEURO: Normal strength and tone, mentation intact and speech normal, CN II-XII grossly intact, subjective change in sensation just below the left eye    Diagnostic Test Results:  none      ASSESSMENT/PLAN:                                                      1. Numbness and tingling of left side of face  Admitted overnight at [facility] and ruled out for stroke. Patient reports a history of seizure disorder but has never seen neurology. I don't think these are related but I am sending a referral to neurology anyway. Its possible that this could be a migraine variant given that she describes a pounding left sided headache. See #2.     2. Atypical migraine  - IMITREX 6 MG  - SUMAtriptan (IMITREX) 50 MG tablet; Take 1 tablet (50 mg) by mouth at onset of headache for migraine May repeat in 2 hours. Max 4 tablets/24 hours.  Dispense: 18 tablet; Refill: 1  - NEUROLOGY ADULT REFERRAL    3. Screening for breast cancer  - MA SCREENING DIGITAL BILAT - Future  (s+30); Future    5. Seizure disorder (H)  - " NEUROLOGY ADULT REFERRAL    Follow up if no improvement in symptoms      Christina Izaguirre MD  [facility] [employer]

## 2017-01-31 NOTE — NURSING NOTE
"Chief Complaint   Patient presents with     Hospital F/U       Initial BP 96/62 mmHg  Pulse 88  Temp(Src) 97.5  F (36.4  C) (Tympanic)  Ht 5' 3\" (1.6 m)  Wt 141 lb (63.957 kg)  BMI 24.98 kg/m2  LMP 05/29/2008 Estimated body mass index is 24.98 kg/(m^2) as calculated from the following:    Height as of this encounter: 5' 3\" (1.6 m).    Weight as of this encounter: 141 lb (63.957 kg).  BP completed using cuff size: eduardo FREDERICK MA Student  "

## 2017-01-31 NOTE — MR AVS SNAPSHOT
After Visit Summary   1/31/2017    Andie Elkins    MRN: [id number]           Patient Information     Date Of Birth          1965        Visit Information        Provider Department      1/31/2017 2:40 PM Christina Izaguirre MD [facility] [place]        Today's Diagnoses     Numbness and tingling of left side of face    -  1     Atypical migraine         Screening for breast cancer         Visit for screening mammogram         Seizure disorder (H)            Follow-ups after your visit        Additional Services     NEUROLOGY ADULT REFERRAL       Your provider has referred you to: [facility] [phone]   [email or web address]    Reason for Referral: Consult    Please be aware that coverage of these services is subject to the terms and limitations of your health insurance plan.  Call member services at your health plan with any benefit or coverage questions.      Please bring the following with you to your appointment:    (1) Any X-Rays, CTs or MRIs which have been performed.  Contact the facility where they were done to arrange for  prior to your scheduled appointment.    (2) List of current medications  (3) This referral request   (4) Any documents/labs given to you for this referral                  Your next 10 appointments already scheduled     Feb 03, 2017  3:00 PM   Evaluation with Cande Lundy, SUDHA   [facility]    [address] [place] [id number] [phone]            Feb 07, 2017  3:00 PM   Return Visit with Mesha Krishnan PSYD   [facility] [place] ([facility] [place])    [address] [place] [address]  [place] [address]   [phone]            Feb 09, 2017  3:15 PM   Evaluation with Octavia Villeda OT   [facility]    [address] [place] [address]  [address]   [phone]            Feb 14, 2017  3:00 PM   Return Visit with Mesha Krishnan PSYD   [facility] [place] ([facility] [place])    [address] [facility]  [place] [address]   [phone]            Feb 21, 2017  3:00 PM   Return Visit with Mesha Krishnan PSYD   [facility] [place] ([facility] [place])    [address] [facility]  [place] [address]   [phone]            Feb 28, 2017  3:00 PM   Return Visit with Mehsa Krishnan PSYD   [facility] [place] ([facility] [place])    [facility]  [place] [address]   [phone]              Future tests that were ordered for you today     Open Future Orders        Priority Expected Expires Ordered    MA SCREENING DIGITAL BILAT - Future  (s+30) Routine  1/31/2018 1/31/2017            Who to contact     If you have questions or need follow up information about today's clinic visit or your schedule please contact [facility] directly at [phone].  Normal or non-critical lab and imaging results will be communicated to you by Figmenthart, letter or phone within 4 business days after the clinic has received the results. If you do not hear from us within 7 days, please contact the clinic through Figmenthart or phone. If you have a critical or abnormal lab result, we will notify you by phone as soon as possible.  Submit refill requests through Spectropath or call your pharmacy and they will forward the refill request to us. Please allow 3 business days for your refill to be completed.          Additional Information About Your Visit        Spectropath Information     Spectropath gives you secure access to your electronic health record. If you see a primary care provider, you can also send messages to your care team and make appointments. If you have questions, please call your primary care clinic.  If you do not have a primary care provider, please call  "[phone] and they will assist you.        Care EveryWhere ID     This is your Care EveryWhere ID. This could be used by other organizations to access your [facility] medical records  [id number]        Your Vitals Were     Pulse Temperature Height BMI (Body Mass Index) Last Period       88 97.5  F (36.4  C) (Tympanic) 5' 3\" (1.6 m) 24.98 kg/m2 05/29/2008        Blood Pressure from Last 3 Encounters:   01/31/17 96/62   01/26/17 107/61   12/12/16 106/57    Weight from Last 3 Encounters:   01/31/17 141 lb (63.957 kg)   01/25/17 139 lb (63.05 kg)   12/12/16 144 lb (65.318 kg)              We Performed the Following     IMITREX 6 MG     NEUROLOGY ADULT REFERRAL          Today's Medication Changes          These changes are accurate as of: 1/31/17  3:18 PM.  If you have any questions, ask your nurse or doctor.               Start taking these medicines.        Dose/Directions    SUMAtriptan 50 MG tablet   Commonly known as:  IMITREX   Used for:  Atypical migraine   Started by:  Christina Izaguirre MD        Dose:  50 mg   Take 1 tablet (50 mg) by mouth at onset of headache for migraine May repeat in 2 hours. Max 4 tablets/24 hours.   Quantity:  18 tablet   Refills:  1            Where to get your medicines      These medications were sent to [facility] - [place], [place] - [address], [address]  [address], [facility] [address]     Phone:  [phone]    - SUMAtriptan 50 MG tablet             Primary Care Provider Office Phone # Fax #    Christina Izaguirre [phone]       [facility] [place] [address] [place], [place] [address]  [place] [address]        Thank you!     Thank you for choosing [facility] [place]  for your care. Our goal is always to provide you with excellent care. Hearing back from our patients is one way we can continue to improve our services. Please take a few minutes to complete the written survey that you may receive in the " mail after your visit with us. Thank you!             Your Updated Medication List - Protect others around you: Learn how to safely use, store and throw away your medicines at www.disposemymeds.org.          This list is accurate as of: 1/31/17  3:18 PM.  Always use your most recent med list.                   Brand Name Dispense Instructions for use    clonazePAM 0.5 MG tablet    klonoPIN     Take 1 tablet (0.5 mg) by mouth At Bedtime       divalproex 500 MG 24 hr tablet    DEPAKOTE ER     Take 500 mg by mouth At Bedtime       docusate sodium 100 MG capsule    COLACE     Take 1 capsule (100 mg) by mouth At Bedtime       GABAPENTIN PO      Take 900 mg by mouth At Bedtime       OLANZapine 10 MG tablet    zyPREXA    10 tablet    Take 1 tablet (10 mg) by mouth daily as needed (agitation)       SUMAtriptan 50 MG tablet    IMITREX    18 tablet    Take 1 tablet (50 mg) by mouth at onset of headache for migraine May repeat in 2 hours. Max 4 tablets/24 hours.       traZODone 100 MG tablet    DESYREL     Take 2 tablets (200 mg) by mouth At Bedtime

## 2017-02-01 ASSESSMENT — PATIENT HEALTH QUESTIONNAIRE - PHQ9: SUM OF ALL RESPONSES TO PHQ QUESTIONS 1-9: 17

## 2017-02-01 ASSESSMENT — ANXIETY QUESTIONNAIRES: GAD7 TOTAL SCORE: 14

## 2017-02-02 ENCOUNTER — TELEPHONE (OUTPATIENT)
Dept: FAMILY MEDICINE | Facility: CLINIC | Age: 52
End: 2017-02-02

## 2017-02-02 NOTE — TELEPHONE ENCOUNTER
Patient that the pharmacy dispense 9 tablet of Imitrex for the charge of $30.     Advised that insurance can put limited amount on certain medication depending on prescription plan.     Instructed to call insurance company regarding cost of medication and limited quantity plus if there is alternate medication that they would like to try first before Imitrex  Patient will call insurance     Catherine Romero RN

## 2017-02-02 NOTE — TELEPHONE ENCOUNTER
Patient calling in to discuss the need for a prior auth on the quantity of Imitrex.   Patient revied a qty of 9 at a copay of $30 even though the provider wrote for 18  Headache resolved - patient put her wife on the phone to discuss the issue    Clear script [phone]- help desk for pharmacies  ID# [id number]  Under wifes name Lana Foreman [facility]    Clinic needs to call in for a prior auth to get the fill 18 tablets    Routing to team 3 to initiate prior auth for high quantity     Sherrie Lacy RN  [facility]  [phone]

## 2017-02-10 NOTE — TELEPHONE ENCOUNTER
Per pt plan only 9 are approved per month, spoke to dr hunt 9 is enough for one month.     Deena Ventura MA

## 2017-04-06 ENCOUNTER — TELEPHONE (OUTPATIENT)
Dept: FAMILY MEDICINE | Facility: CLINIC | Age: 52
End: 2017-04-06

## 2017-04-06 NOTE — TELEPHONE ENCOUNTER
Patients wife calling to see if PCP knows where patient can have neuropsych testing done to r/o any learning disability. States she spoke with insurance company and they did not have a list of local clinics which offered it. [facility] counselor scheduling line given for her to find out if its a service offered through [facility].   Kelly Webster RN   [facility] - Triage

## 2017-05-02 ENCOUNTER — RADIANT APPOINTMENT (OUTPATIENT)
Dept: GENERAL RADIOLOGY | Facility: CLINIC | Age: 52
End: 2017-05-02
Attending: INTERNAL MEDICINE
Payer: COMMERCIAL

## 2017-05-02 ENCOUNTER — OFFICE VISIT (OUTPATIENT)
Dept: FAMILY MEDICINE | Facility: CLINIC | Age: 52
End: 2017-05-02
Payer: COMMERCIAL

## 2017-05-02 VITALS
SYSTOLIC BLOOD PRESSURE: 117 MMHG | DIASTOLIC BLOOD PRESSURE: 76 MMHG | HEIGHT: 63 IN | BODY MASS INDEX: 27.36 KG/M2 | WEIGHT: 154.4 LBS | HEART RATE: 86 BPM | OXYGEN SATURATION: 98 % | TEMPERATURE: 98 F

## 2017-05-02 DIAGNOSIS — M79.674 PAIN OF TOE OF RIGHT FOOT: ICD-10-CM

## 2017-05-02 DIAGNOSIS — M79.674 PAIN OF TOE OF RIGHT FOOT: Primary | ICD-10-CM

## 2017-05-02 DIAGNOSIS — F31.81 BIPOLAR II DISORDER, MOST RECENT EPISODE MAJOR DEPRESSIVE (H): ICD-10-CM

## 2017-05-02 DIAGNOSIS — Z12.31 VISIT FOR SCREENING MAMMOGRAM: ICD-10-CM

## 2017-05-02 DIAGNOSIS — F33.0 MAJOR DEPRESSIVE DISORDER, RECURRENT EPISODE, MILD (H): ICD-10-CM

## 2017-05-02 DIAGNOSIS — G40.909 SEIZURE DISORDER (H): ICD-10-CM

## 2017-05-02 PROCEDURE — 99213 OFFICE O/P EST LOW 20 MIN: CPT | Performed by: INTERNAL MEDICINE

## 2017-05-02 PROCEDURE — 73630 X-RAY EXAM OF FOOT: CPT | Mod: RT

## 2017-05-02 ASSESSMENT — ANXIETY QUESTIONNAIRES
1. FEELING NERVOUS, ANXIOUS, OR ON EDGE: MORE THAN HALF THE DAYS
6. BECOMING EASILY ANNOYED OR IRRITABLE: MORE THAN HALF THE DAYS
IF YOU CHECKED OFF ANY PROBLEMS ON THIS QUESTIONNAIRE, HOW DIFFICULT HAVE THESE PROBLEMS MADE IT FOR YOU TO DO YOUR WORK, TAKE CARE OF THINGS AT HOME, OR GET ALONG WITH OTHER PEOPLE: NOT DIFFICULT AT ALL
2. NOT BEING ABLE TO STOP OR CONTROL WORRYING: MORE THAN HALF THE DAYS
7. FEELING AFRAID AS IF SOMETHING AWFUL MIGHT HAPPEN: MORE THAN HALF THE DAYS
3. WORRYING TOO MUCH ABOUT DIFFERENT THINGS: NEARLY EVERY DAY
5. BEING SO RESTLESS THAT IT IS HARD TO SIT STILL: MORE THAN HALF THE DAYS
GAD7 TOTAL SCORE: 15

## 2017-05-02 ASSESSMENT — PATIENT HEALTH QUESTIONNAIRE - PHQ9: 5. POOR APPETITE OR OVEREATING: MORE THAN HALF THE DAYS

## 2017-05-02 ASSESSMENT — PAIN SCALES - GENERAL: PAINLEVEL: EXTREME PAIN (8)

## 2017-05-02 NOTE — MR AVS SNAPSHOT
After Visit Summary   5/2/2017    Andie Elkins    MRN: [id number]           Patient Information     Date Of Birth          1965        Visit Information        Provider Department      5/2/2017 4:20 PM Christina Izaguirre MD [facility] [place]        Today's Diagnoses     Pain of toe of right foot    -  1    Seizure disorder (H)        Visit for screening mammogram        Bipolar II disorder, most recent episode major depressive (H)        Major depressive disorder, recurrent episode, mild (H)           Follow-ups after your visit        Additional Services     NEUROLOGY ADULT REFERRAL       Your provider has referred you to: N: [facility]  [place] [phone]   [email or web address]    Reason for Referral: Consult and EEG    Please be aware that coverage of these services is subject to the terms and limitations of your health insurance plan.  Call member services at your health plan with any benefit or coverage questions.      Please bring the following with you to your appointment:    (1) Any X-Rays, CTs or MRIs which have been performed.  Contact the facility where they were done to arrange for  prior to your scheduled appointment.    (2) List of current medications  (3) This referral request   (4) Any documents/labs given to you for this referral            ORTHO  REFERRAL       [facility] is referring you to the Orthopedic  Services at [facility].       The  Representative will assist you in the coordination of your Orthopedic and Musculoskeletal Care as prescribed by your physician.    The  Representative will call you within 1 business day to help schedule your appointment, or you may contact the  Representative at:    All areas ~ [phone]     Type of Referral : [facility]       Timeframe  requested: 1 - 2 days    Coverage of these services is subject to the terms and limitations of your health insurance plan.  Please call member services at your health plan with any benefit or coverage questions.      If X-rays, CT or MRI's have been performed, please contact the facility where they were done to arrange for , prior to your scheduled appointment.  Please bring this referral request to your appointment and present it to your specialist.                  Future tests that were ordered for you today     Open Future Orders        Priority Expected Expires Ordered    MA SCREENING DIGITAL BILAT - Future  (s+30) Routine  5/2/2018 5/2/2017            Who to contact     If you have questions or need follow up information about today's clinic visit or your schedule please contact [facility] [place] directly at [phone].  Normal or non-critical lab and imaging results will be communicated to you by MyChart, letter or phone within 4 business days after the clinic has received the results. If you do not hear from us within 7 days, please contact the clinic through MyChart or phone. If you have a critical or abnormal lab result, we will notify you by phone as soon as possible.  Submit refill requests through [employer] [place] or call your pharmacy and they will forward the refill request to us. Please allow 3 business days for your refill to be completed.          Additional Information About Your Visit        easy2comply (Dynasec)harTransitScreen Information     [employer] gives you secure access to your electronic health record. If you see a primary care provider, you can also send messages to your care team and make appointments. If you have questions, please call your primary care clinic.  If you do not have a primary care provider, please call [phone] and they will assist you.        Care EveryWhere ID     This is your Care EveryWhere ID. This could be used by other organizations to access your [facility]  "records  [id number]        Your Vitals Were     Pulse Temperature Height Last Period Pulse Oximetry BMI (Body Mass Index)    86 98  F (36.7  C) (Oral) 5' 3\" (1.6 m) 05/29/2008 98% 27.35 kg/m2       Blood Pressure from Last 3 Encounters:   05/02/17 117/76   01/31/17 96/62   01/26/17 107/61    Weight from Last 3 Encounters:   05/02/17 154 lb 6.4 oz (70 kg)   01/31/17 141 lb (64 kg)   01/25/17 139 lb (63 kg)              We Performed the Following     NEUROLOGY ADULT REFERRAL     ORTHO  REFERRAL        Primary Care Provider Office Phone # Fax #    Christina Izaguirre [phone]       [facility]  [facility] [address]  [place] [address]        Thank you!     Thank you for choosing [facility]  for your care. Our goal is always to provide you with excellent care. Hearing back from our patients is one way we can continue to improve our services. Please take a few minutes to complete the written survey that you may receive in the mail after your visit with us. Thank you!             Your Updated Medication List - Protect others around you: Learn how to safely use, store and throw away your medicines at www.disposemymeds.org.          This list is accurate as of: 5/2/17  4:58 PM.  Always use your most recent med list.                   Brand Name Dispense Instructions for use    clonazePAM 0.5 MG tablet    klonoPIN     Take 1 tablet (0.5 mg) by mouth At Bedtime       divalproex 500 MG 24 hr tablet    DEPAKOTE ER     Take 500 mg by mouth At Bedtime       docusate sodium 100 MG capsule    COLACE     Take 1 capsule (100 mg) by mouth At Bedtime       GABAPENTIN PO      Take 900 mg by mouth At Bedtime       traZODone 100 MG tablet    DESYREL     Take 2 tablets (200 mg) by mouth At Bedtime         "

## 2017-05-02 NOTE — PROGRESS NOTES
SUBJECTIVE:                                                    Andie Elkins is a 51 year old female who presents to clinic today for the following health issues:        Foot Pain     Onset: 3-4 years    Description:   Location: Right foot   Character: Sharp    Intensity: severe    Progression of Symptoms: worse    Accompanying Signs & Symptoms:  Other symptoms: swelling   History:   Previous similar pain: YES      Precipitating factors:   Trauma or overuse: no     Alleviating factors:  Improved by: ice, massage, stretching and deep heating rub       Therapies Tried and outcome: Ibuprofen - shows no improvement     Having a lot of pain in the right foot right at the distal part of the heel and radiating forward. Denies pain in the foot first thing in the morning but pain worsens as the day goes on.    Referral for a Mammogram and EEG.     Problem list and histories reviewed & adjusted, as indicated.  Additional history: as documented    Patient Active Problem List   Diagnosis     CARDIOVASCULAR SCREENING; LDL GOAL LESS THAN 160     Lumbago     Hyperlipidemia LDL goal <160     Left shoulder pain     Bipolar II disorder, most recent episode major depressive (H)     Major depressive disorder, recurrent episode, mild (H)     Gastroesophageal reflux disease, esophagitis presence not specified     Nausea     Personal history of malignant neoplasm of breast     Suicidal ideation     TIA (transient ischemic attack)     Past Surgical History:   Procedure Laterality Date     C ANESTH,LOWER ARM SURGERY      reconstruction. fracture     GYN SURGERY  endometriosis     LUMPECTOMY BREAST Left 2005     ORTHOPEDIC SURGERY         Social History   Substance Use Topics     Smoking status: Never Smoker     Smokeless tobacco: Never Used     Alcohol use 0.0 oz/week     0 Standard drinks or equivalent per week      Comment: occasional     Family History   Problem Relation Age of Onset     Lipids Mother      Hypertension Mother   "    CEREBROVASCULAR DISEASE Mother      Depression Mother      DIABETES Mother 73     HEART DISEASE Mother 73     heart mumur     C.A.D. Father      CABGx4     DIABETES Father      Alcohol/Drug Father      CANCER Father      lung cancer     Dementia Father      C.A.D. Maternal Grandfather      open heart surgery     DIABETES Maternal Grandfather      CANCER Maternal Grandfather      Alcohol/Drug Maternal Grandfather      Hypertension Maternal Grandmother      DIABETES Maternal Grandmother      Breast Cancer Maternal Grandmother      Circulatory Paternal Uncle      Blood clots     Neurologic Disorder Sister      Epilepsy     DIABETES Paternal Grandmother      DIABETES Paternal Grandfather      Alcohol/Drug Paternal Grandfather      Breast Cancer Maternal Aunt      Breast Cancer Maternal Aunt      Breast Cancer Maternal Aunt      CANCER Maternal Uncle      Throat     CANCER Maternal Uncle      Jaw     Alcohol/Drug Brother      CEREBROVASCULAR DISEASE Sister      Myocardial Infarction Brother 59     Cancer - colorectal Maternal Uncle            ROS:  Constitutional, HEENT, cardiovascular, pulmonary, gi and gu systems are negative, except as otherwise noted.    OBJECTIVE:                                                    /76 (BP Location: Left arm, Patient Position: Chair, Cuff Size: Adult Regular)  Pulse 86  Temp 98  F (36.7  C) (Oral)  Ht 5' 3\" (1.6 m)  Wt 154 lb 6.4 oz (70 kg)  LMP 05/29/2008  SpO2 98%  BMI 27.35 kg/m2  Body mass index is 27.35 kg/(m^2).  GENERAL: healthy, alert and no distress  NECK: no adenopathy, no asymmetry, masses, or scars and thyroid normal to palpation  RESP: lungs clear to auscultation - no rales, rhonchi or wheezes  CV: regular rate and rhythm, normal S1 S2, no S3 or S4, no murmur, click or rub, no peripheral edema and peripheral pulses strong  ABDOMEN: soft, nontender, no hepatosplenomegaly, no masses and bowel sounds normal  MS: Pain over the left plantar fascia  NEURO: " Normal strength and tone, mentation intact and speech normal    Diagnostic Test Results:  X-ray right foot:        IMPRESSION: No acute osseous abnormality.     ASSESSMENT/PLAN:                                                        1. Pain of toe of right foot:  Most likely plantar fasciitis. Discussed stretches to do at home, ice, and NSAIDS. Patient would like a referral to podiatry.   - XR Foot Right G/E 3 Views; Future  - ORTHO  REFERRAL    2. Seizure disorder (H)  - NEUROLOGY ADULT REFERRAL    3. Visit for screening mammogram  - MA SCREENING DIGITAL BILAT - Future  (s+30); Future    4. Bipolar II disorder, most recent episode major depressive (H)    5. Major depressive disorder, recurrent episode, mild (H)      Follow up as needed.      Christina Izaguirre MD  [facility]

## 2017-05-02 NOTE — NURSING NOTE
"Chief Complaint   Patient presents with     Musculoskeletal Problem       Initial /76 (BP Location: Left arm, Patient Position: Chair, Cuff Size: Adult Regular)  Pulse 86  Temp 98  F (36.7  C) (Oral)  Ht 5' 3\" (1.6 m)  Wt 154 lb 6.4 oz (70 kg)  LMP 05/29/2008  SpO2 98%  BMI 27.35 kg/m2 Estimated body mass index is 27.35 kg/(m^2) as calculated from the following:    Height as of this encounter: 5' 3\" (1.6 m).    Weight as of this encounter: 154 lb 6.4 oz (70 kg).  Medication Reconciliation: complete  "

## 2017-05-03 ASSESSMENT — PATIENT HEALTH QUESTIONNAIRE - PHQ9: SUM OF ALL RESPONSES TO PHQ QUESTIONS 1-9: 15

## 2017-05-03 ASSESSMENT — ANXIETY QUESTIONNAIRES: GAD7 TOTAL SCORE: 15

## 2017-05-17 ENCOUNTER — APPOINTMENT (OUTPATIENT)
Dept: CT IMAGING | Facility: CLINIC | Age: 52
End: 2017-05-17
Attending: EMERGENCY MEDICINE
Payer: COMMERCIAL

## 2017-05-17 ENCOUNTER — HOSPITAL ENCOUNTER (EMERGENCY)
Facility: CLINIC | Age: 52
Discharge: HOME | End: 2017-05-17
Attending: EMERGENCY MEDICINE | Admitting: EMERGENCY MEDICINE
Payer: COMMERCIAL

## 2017-05-17 VITALS
DIASTOLIC BLOOD PRESSURE: 70 MMHG | SYSTOLIC BLOOD PRESSURE: 124 MMHG | BODY MASS INDEX: 22.58 KG/M2 | TEMPERATURE: 98.9 F | WEIGHT: 149 LBS | OXYGEN SATURATION: 99 % | HEART RATE: 55 BPM | HEIGHT: 68 IN | RESPIRATION RATE: 18 BRPM

## 2017-05-17 DIAGNOSIS — R10.31 ABDOMINAL PAIN, RIGHT LOWER QUADRANT: ICD-10-CM

## 2017-05-17 LAB
ALBUMIN UR-MCNC: NEGATIVE MG/DL
ANION GAP SERPL CALCULATED.3IONS-SCNC: 5 MMOL/L (ref 3–14)
APPEARANCE UR: CLEAR
BACTERIA #/AREA URNS HPF: ABNORMAL /HPF
BASOPHILS # BLD AUTO: 0 10E9/L (ref 0–0.2)
BASOPHILS NFR BLD AUTO: 0.6 %
BILIRUB UR QL STRIP: NEGATIVE
BUN SERPL-MCNC: 14 MG/DL (ref 7–30)
CALCIUM SERPL-MCNC: 8.4 MG/DL (ref 8.5–10.1)
CHLORIDE SERPL-SCNC: 108 MMOL/L (ref 94–109)
CO2 SERPL-SCNC: 27 MMOL/L (ref 20–32)
COLOR UR AUTO: YELLOW
CREAT SERPL-MCNC: 0.68 MG/DL (ref 0.52–1.04)
CRP SERPL-MCNC: <2.9 MG/L (ref 0–8)
DIFFERENTIAL METHOD BLD: ABNORMAL
EOSINOPHIL # BLD AUTO: 0.1 10E9/L (ref 0–0.7)
EOSINOPHIL NFR BLD AUTO: 1.7 %
ERYTHROCYTE [DISTWIDTH] IN BLOOD BY AUTOMATED COUNT: 12.7 % (ref 10–15)
GFR SERPL CREATININE-BSD FRML MDRD: ABNORMAL ML/MIN/1.7M2
GLUCOSE SERPL-MCNC: 104 MG/DL (ref 70–99)
GLUCOSE UR STRIP-MCNC: NEGATIVE MG/DL
HCT VFR BLD AUTO: 33.4 % (ref 35–47)
HGB BLD-MCNC: 11.3 G/DL (ref 11.7–15.7)
HGB UR QL STRIP: ABNORMAL
IMM GRANULOCYTES # BLD: 0 10E9/L (ref 0–0.4)
IMM GRANULOCYTES NFR BLD: 0.3 %
KETONES UR STRIP-MCNC: NEGATIVE MG/DL
LEUKOCYTE ESTERASE UR QL STRIP: ABNORMAL
LYMPHOCYTES # BLD AUTO: 1.1 10E9/L (ref 0.8–5.3)
LYMPHOCYTES NFR BLD AUTO: 30.4 %
MCH RBC QN AUTO: 31.9 PG (ref 26.5–33)
MCHC RBC AUTO-ENTMCNC: 33.8 G/DL (ref 31.5–36.5)
MCV RBC AUTO: 94 FL (ref 78–100)
MONOCYTES # BLD AUTO: 0.3 10E9/L (ref 0–1.3)
MONOCYTES NFR BLD AUTO: 8.3 %
NEUTROPHILS # BLD AUTO: 2.1 10E9/L (ref 1.6–8.3)
NEUTROPHILS NFR BLD AUTO: 58.7 %
NITRATE UR QL: NEGATIVE
NRBC # BLD AUTO: 0 10*3/UL
NRBC BLD AUTO-RTO: 0 /100
PH UR STRIP: 6.5 PH (ref 5–7)
PLATELET # BLD AUTO: 219 10E9/L (ref 150–450)
POTASSIUM SERPL-SCNC: 3.8 MMOL/L (ref 3.4–5.3)
RBC # BLD AUTO: 3.54 10E12/L (ref 3.8–5.2)
RBC #/AREA URNS AUTO: 3 /HPF (ref 0–2)
SODIUM SERPL-SCNC: 140 MMOL/L (ref 133–144)
SP GR UR STRIP: 1.02 (ref 1–1.03)
SQUAMOUS #/AREA URNS AUTO: 2 /HPF (ref 0–1)
URN SPEC COLLECT METH UR: ABNORMAL
UROBILINOGEN UR STRIP-MCNC: NORMAL MG/DL (ref 0–2)
WBC # BLD AUTO: 3.6 10E9/L (ref 4–11)
WBC #/AREA URNS AUTO: 2 /HPF (ref 0–2)

## 2017-05-17 PROCEDURE — 25000125 ZZHC RX 250: Performed by: EMERGENCY MEDICINE

## 2017-05-17 PROCEDURE — 86140 C-REACTIVE PROTEIN: CPT | Performed by: EMERGENCY MEDICINE

## 2017-05-17 PROCEDURE — 81001 URINALYSIS AUTO W/SCOPE: CPT | Performed by: EMERGENCY MEDICINE

## 2017-05-17 PROCEDURE — 74177 CT ABD & PELVIS W/CONTRAST: CPT

## 2017-05-17 PROCEDURE — 25000128 H RX IP 250 OP 636: Performed by: EMERGENCY MEDICINE

## 2017-05-17 PROCEDURE — 99285 EMERGENCY DEPT VISIT HI MDM: CPT | Mod: 25

## 2017-05-17 PROCEDURE — 85025 COMPLETE CBC W/AUTO DIFF WBC: CPT | Performed by: EMERGENCY MEDICINE

## 2017-05-17 PROCEDURE — 96361 HYDRATE IV INFUSION ADD-ON: CPT

## 2017-05-17 PROCEDURE — 96375 TX/PRO/DX INJ NEW DRUG ADDON: CPT

## 2017-05-17 PROCEDURE — 96374 THER/PROPH/DIAG INJ IV PUSH: CPT | Mod: 59

## 2017-05-17 PROCEDURE — 80048 BASIC METABOLIC PNL TOTAL CA: CPT | Performed by: EMERGENCY MEDICINE

## 2017-05-17 RX ORDER — SODIUM CHLORIDE 9 MG/ML
1000 INJECTION, SOLUTION INTRAVENOUS CONTINUOUS
Status: DISCONTINUED | OUTPATIENT
Start: 2017-05-17 | End: 2017-05-17 | Stop reason: HOSPADM

## 2017-05-17 RX ORDER — LIDOCAINE 40 MG/G
CREAM TOPICAL
Status: DISCONTINUED | OUTPATIENT
Start: 2017-05-17 | End: 2017-05-17 | Stop reason: HOSPADM

## 2017-05-17 RX ORDER — NAPROXEN 500 MG/1
500 TABLET ORAL 2 TIMES DAILY WITH MEALS
Qty: 16 TABLET | Refills: 0 | Status: SHIPPED | OUTPATIENT
Start: 2017-05-17 | End: 2017-05-25

## 2017-05-17 RX ORDER — KETOROLAC TROMETHAMINE 15 MG/ML
15 INJECTION, SOLUTION INTRAMUSCULAR; INTRAVENOUS ONCE
Status: COMPLETED | OUTPATIENT
Start: 2017-05-17 | End: 2017-05-17

## 2017-05-17 RX ORDER — HYDROMORPHONE HYDROCHLORIDE 1 MG/ML
0.2 INJECTION, SOLUTION INTRAMUSCULAR; INTRAVENOUS; SUBCUTANEOUS
Status: COMPLETED | OUTPATIENT
Start: 2017-05-17 | End: 2017-05-17

## 2017-05-17 RX ORDER — ONDANSETRON 2 MG/ML
4 INJECTION INTRAMUSCULAR; INTRAVENOUS
Status: COMPLETED | OUTPATIENT
Start: 2017-05-17 | End: 2017-05-17

## 2017-05-17 RX ORDER — TRAMADOL HYDROCHLORIDE 50 MG/1
50-100 TABLET ORAL EVERY 6 HOURS PRN
Qty: 20 TABLET | Refills: 0 | Status: SHIPPED | OUTPATIENT
Start: 2017-05-17 | End: 2017-05-23

## 2017-05-17 RX ORDER — IOPAMIDOL 755 MG/ML
75 INJECTION, SOLUTION INTRAVASCULAR ONCE
Status: COMPLETED | OUTPATIENT
Start: 2017-05-17 | End: 2017-05-17

## 2017-05-17 RX ADMIN — ONDANSETRON 4 MG: 2 SOLUTION INTRAMUSCULAR; INTRAVENOUS at 12:50

## 2017-05-17 RX ADMIN — HYDROMORPHONE HYDROCHLORIDE 0.2 MG: 1 INJECTION, SOLUTION INTRAMUSCULAR; INTRAVENOUS; SUBCUTANEOUS at 12:55

## 2017-05-17 RX ADMIN — SODIUM CHLORIDE 1000 ML: 9 INJECTION, SOLUTION INTRAVENOUS at 12:50

## 2017-05-17 RX ADMIN — IOPAMIDOL 75 ML: 755 INJECTION, SOLUTION INTRAVENOUS at 13:25

## 2017-05-17 RX ADMIN — SODIUM CHLORIDE 63 ML: 9 INJECTION, SOLUTION INTRAVENOUS at 13:26

## 2017-05-17 RX ADMIN — KETOROLAC TROMETHAMINE 15 MG: 15 INJECTION, SOLUTION INTRAMUSCULAR; INTRAVENOUS at 12:52

## 2017-05-17 NOTE — ED PROVIDER NOTES
"  History     Chief Complaint:  Abdominal Pain    HPI   Andie Elkins is a 51 year old female who presents with abdominal pain. The patient reports she has been having intermittent abdominal pains over the past two weeks. At first, ibuprofen was effective at alleviating her pain. Today, she began experiencing right sided abdominal pain that goes down to her right leg. She took 4 ibuprofen, with no symptomatic relief. She then experienced an episode of severe pain at work that caused her to fall to the floor. She has had prior abdominal surgery to remove an ovarian cyst.     Allergies:  Codeine  Penicillins     Medications:    Depakote  Gabapentin  Klonopin  Desyrel  Colace     Past Medical History:    Breast cancer  Depression  MI  HLD  Bipolar disorder  TIA    Past Surgical History:    Arm reconstruction  Gyn surgery  Breast lumpectomy    Family History:    HLD  HTN  Cerebrovascular disease  Depression  Substance abuse  CAD  Epilepsy    Social History:  Relationship status:   Tobacco use: Former smoker (Quit 1985)  Alcohol use: Positive  The patient presents with a friend.     Review of Systems  No fever, no vomiting or diarrheay, no dysria, no vaginal bleeding or discharge. All other systems negative    Physical Exam   First Vitals:  BP: 110/71  Pulse: 67  Temp: 98.9  F (37.2  C)  Resp: 16  Height: 172.7 cm (5' 8\")  Weight: 67.6 kg (149 lb)  SpO2: 97 %    Physical Exam   Constitutional: She is oriented to person, place, and time. She appears well-developed.   HENT:   Head: Normocephalic and atraumatic.   Right Ear: External ear normal.   Mouth/Throat: Oropharynx is clear and moist.   Eyes: Conjunctivae and EOM are normal. Pupils are equal, round, and reactive to light.   Neck: Normal range of motion. Neck supple. No JVD present.   Cardiovascular: Normal rate, regular rhythm and normal heart sounds.    Pulmonary/Chest: Effort normal and breath sounds normal.   Abdominal: Soft. Bowel sounds are " normal. She exhibits no distension. There is no hepatosplenomegaly. There is tenderness in the right lower quadrant and suprapubic area. There is no rebound and no CVA tenderness. No hernia.   Musculoskeletal: Normal range of motion.   Lymphadenopathy:     She has no cervical adenopathy.   Neurological: She is alert and oriented to person, place, and time. She displays normal reflexes. No cranial nerve deficit. She exhibits normal muscle tone. Coordination normal.   Skin: Skin is warm and dry.   Psychiatric: She has a normal mood and affect. Her behavior is normal. Judgment normal.   Nursing note and vitals reviewed.        Emergency Department Course     Imaging:  Radiographic findings were communicated with the patient who voiced understanding of the findings.    CT Abdomen and Pelvis, w contrast, per radiology:   Unremarkable CT scan of the abdomen and pelvis.       Laboratory:  CBC: WBC 3.6 (L), HGB 11.3 (L),   BMP: Glucose 104 (H), Calcium 8.4 (L), o/w WNL (Creatinine 0.68)  CRP inflammation: <2.9  UA: Clear, yellow urine: Blood trace (A), Leukocyte esterase moderate (A), RBC/HPF 3 (H), Bacteria few (A), Squamous epithelial/HPF 2 (H), o/w WNL    Interventions:  1250: Zofran, 4 mg, IV injection  1250: Normal Saline, 1000 mL, IV  1252: Toradol, 15 mg, IV injection  1255: Dilaudid, 0.2 mg, IV injection    Emergency Department Course:  Nursing notes and vitals reviewed.  I performed an exam of the patient as documented above.  The above workup was undertaken.   I rechecked the patient and discussed results.    Findings and plan explained to the Patient. Patient discharged home, status improved, with instructions regarding supportive care, medications, and reasons to return as well as the importance of close follow-up was reviewed.      Impression & Plan      Medical Decision Making:  Andie Elkins is a 51 year old female with lower pelvic and right sided abdominal pain, intermittent for a few weeks,  but now more constant and focal to right lower abdomen, with radiation to right leg. Patient is well-appearing and tender to palpation. Differential does include atypical appendicitis presentation. Labs are unremarkable. I did choose CT for a complete evaluation due to differential diagnosis, including diverticulitis on the right. CT is unremarkable. I did consider ultrasound due to lack of large ovarian cyst seen on CT. I doubt torsion. Recommended treating pain and follow up with PCP.    Diagnosis:    ICD-10-CM   1. Abdominal pain, right lower quadrant R10.31   2. Suspect ovarian cyst or pelvic pain      Disposition:  Discharge to home with primary care follow up.    Discharge Medications:  naproxen (NAPROSYN) 500 MG tablet Take 1 tablet (500 mg) by mouth 2 times daily (with meals) for 8 days, Disp-16 tablet, R-0, Local Print   tramadol (ULTRAM) 50 MG tablet Take 1-2 tablets ( mg) by mouth every 6 hours as needed for pain maximum 8 tablet(s) per day, Disp-20 tablet, R-0, Local Print     I, Shaun Lopez, am serving as a scribe on 5/17/2017 at 12:24 PM to personally document services performed by Severo James MD, based on my observations and the provider's statements to me.     EMERGENCY DEPARTMENT       Severo James MD  05/19/17 [id number]

## 2017-05-17 NOTE — ED AVS SNAPSHOT
Emergency Department    [address] [place] [address]    Phone:  [phone]    Fax:  [phone]                                       Andie Elkins   MRN: [id number]    Department:   Emergency Department   Date of Visit:  5/17/2017           After Visit Summary Signature Page     I have received my discharge instructions, and my questions have been answered. I have discussed any challenges I see with this plan with the nurse or doctor.    ..........................................................................................................................................  Patient/Patient Representative Signature      ..........................................................................................................................................  Patient Representative Print Name and Relationship to Patient    ..................................................               ................................................  Date                                            Time    ..........................................................................................................................................  Reviewed by Signature/Title    ...................................................              ..............................................  Date                                                            Time

## 2017-05-17 NOTE — ED AVS SNAPSHOT
Emergency Department    [address] [facility] [address]    Phone:  [phone]    Fax:  [phone]                                       Andie Elkins   MRN: [id number]    Department:   Emergency Department   Date of Visit:  5/17/2017           Patient Information     Date Of Birth          1965        Your diagnoses for this visit were:     Abdominal pain, right lower quadrant        You were seen by Severo James MD.      Follow-up Information     Follow up with Christina Izaguirre MD In 3 days.    Specialty:  Pediatrics    Why:  As needed    Contact information:    [facility]  [address] [place] [address] [address]  [place] [address]  [phone]          Discharge Instructions       Discharge Instructions  Abdominal Pain    Abdominal pain can be caused by many things. Your evaluation today does not show the exact cause for your pain. Your doctor today has decided that it is unlikely your pain is due to a life threatening problem, or a problem requiring surgery or hospital admission. Sometimes those problems cannot be found right away, so it is very important that you follow up as directed.  Sometimes only the changes which occur over time allow the cause of your pain to be found.    Return to the Emergency Department for a recheck in 8-12 hours if your pain continues.  If your pain gets worse, changes in location, or feels different, return to the Emergency Department right away.    ADULTS:  Return to the Emergency Department right away if:      You get an oral temperature above 102oF or as directed by your doctor.    You have blood in your stools (bright red or black, tarry stools).    You keep throwing up or can t drink liquids.    You see blood when you throw up.    You can t have a bowel movement or you can t pass gas.    Your stomach gets bloated or bigger.    Your skin or the whites of your eyes look yellow.    You faint.    You have bloody, frequent or  painful urination.    You have new symptoms or anything that worries you.    CHILDREN:  Return to the Emergency Department right away if your child has any of the above-listed symptoms or the following:      Pushes your hand away or screams/cries when his/her belly is touched.    You notice your child is very fussy or weak.    Your child is very tired and is too tired to eat or drink.    Your child is dehydrated.  Signs of dehydration can be:  o Your infant has had no wet diapers in 4-5 hours.  o Your older child has not passed urine in 6-8 hours.  o Your infant or child starts to have dry mouth and lips, or no saliva or tears.    PREGNANT WOMEN:  Return to the Emergency Department right away if you have any of the above-listed symptoms or the following:      You have bleeding, leaking fluid or passing tissue from the vagina.    You have worse pain or cramping, or pain in your shoulder or back.    You have vomiting that will not stop.    You have painful or bloody urination.    You have a temperature of 100oF or more.    Your baby is not moving as much as usual.    You faint.    You get a bad headache with or without eye problems and abdominal pain.    You have a convulsion or seizure.    You have unusual discharge from your vagina and abdominal pain.    Abdominal pain is pretty common during pregnancy.  Your pain may or may not be related to your pregnancy. You should follow-up closely with your OB doctor so they can evaluate you and your baby.  Until you follow-up with your regular doctor, do the following:       Avoid sex and do not put anything in your vagina.    Drink clear fluids.    Only take medications approved by your doctor.    MORE INFORMATION:    Appendicitis:  A possible cause of abdominal pain in any person who still has their appendix is acute appendicitis. Appendicitis is often hard to diagnose.  Testing does not always rule out early appendicitis or other causes of abdominal pain. Close follow-up  "with your doctor and re-evaluations may be needed to figure out the reason for your abdominal pain.    Follow-up:  It is very important that you make an appointment with your clinic and go to the appointment.  If you do not follow-up with your primary doctor, it may result in missing an important development which could result in permanent injury or disability and/or lasting pain.  If there is any problem keeping your appointment, call your doctor or return to the Emergency Department.    Medications:  Take your medications as directed by your doctor today.  Before using over-the-counter medications, ask your doctor and make sure to take the medications as directed.  If you have any questions about medications, ask your doctor.    Diet:  Resume your normal diet as much as possible, but do not eat fried, fatty or spicy foods while you have pain.  Do not drink alcohol or have caffeine.  Do not smoke tobacco.    Probiotics: If you have been given an antibiotic, you may want to also take a probiotic pill or eat yogurt with live cultures. Probiotics have \"good bacteria\" to help your intestines stay healthy. Studies have shown that probiotics help prevent diarrhea and other intestine problems (including C. diff infection) when you take antibiotics. You can buy these without a prescription in the pharmacy section of the store.     If you were given a prescription for medicine here today, be sure to read all of the information (including the package insert) that comes with your prescription.  This will include important information about the medicine, its side effects, and any warnings that you need to know about.  The pharmacist who fills the prescription can provide more information and answer questions you may have about the medicine.  If you have questions or concerns that the pharmacist cannot address, please call or return to the Emergency Department.         Opioid Medication Information    Pain medications are among " the most commonly prescribed medicines, so we are including this information for all our patients. If you did not receive pain medication or get a prescription for pain medicine, you can ignore it.     You may have been given a prescription for an opioid (narcotic) pain medicine and/or have received a pain medicine while here in the Emergency Department. These medicines can make you drowsy or impaired. You must not drive, operate dangerous equipment, or engage in any other dangerous activities while taking these medications. If you drive while taking these medications, you could be arrested for DUI, or driving under the influence. Do not drink any alcohol while you are taking these medications.     Opioid pain medications can cause addiction. If you have a history of chemical dependency of any type, you are at a higher risk of becoming addicted to pain medications.  Only take these prescribed medications to treat your pain when all other options have been tried. Take it for as short a time and as few doses as possible. Store your pain pills in a secure place, as they are frequently stolen and provide a dangerous opportunity for children or visitors in your house to start abusing these powerful medications. We will not replace any lost or stolen medicine.  As soon as your pain is better, you should flush all your remaining medication.     Many prescription pain medications contain Tylenol  (acetaminophen), including Vicodin , Tylenol #3 , Norco , Lortab , and Percocet .  You should not take any extra pills of Tylenol  if you are using these prescription medications or you can get very sick.  Do not ever take more than 3000 mg of acetaminophen in any 24 hour period.    All opioids tend to cause constipation. Drink plenty of water and eat foods that have a lot of fiber, such as fruits, vegetables, prune juice, apple juice and high fiber cereal.  Take a laxative if you don t move your bowels at least every other day.  Miralax , Milk of Magnesia, Colace , or Senna  can be used to keep you regular.      Remember that you can always come back to the Emergency Department if you are not able to see your regular doctor in the amount of time listed above, if you get any new symptoms, or if there is anything that worries you.          Future Appointments        Provider Department Dept Phone Center    5/19/2017 7:15 AM [facility] BREAST CTR MAMMO ROOM2 [facility] [phone] [facility]    5/22/2017 4:00 PM Christina Izaguirre MD [facility] Chase [phone]     5/30/2017 4:20 PM Az Maldonado DPM [facility] [phone]       24 Hour Appointment Hotline       To make an appointment at any [facility], call [phone] ([phone]). If you don't have a family doctor or clinic, we will help you find one. [facility] are conveniently located to serve the needs of you and your family.             Review of your medicines      START taking        Dose / Directions Last dose taken    naproxen 500 MG tablet   Commonly known as:  NAPROSYN   Dose:  500 mg   Quantity:  16 tablet        Take 1 tablet (500 mg) by mouth 2 times daily (with meals) for 8 days   Refills:  0        traMADol 50 MG tablet   Commonly known as:  ULTRAM   Dose:   mg   Quantity:  20 tablet        Take 1-2 tablets ( mg) by mouth every 6 hours as needed for pain maximum 8 tablet(s) per day   Refills:  0          Our records show that you are taking the medicines listed below. If these are incorrect, please call your family doctor or clinic.        Dose / Directions Last dose taken    clonazePAM 0.5 MG tablet   Commonly known as:  klonoPIN   Dose:  0.5 mg        Take 1 tablet (0.5 mg) by mouth At Bedtime   Refills:  0        divalproex 500 MG 24 hr tablet   Commonly known as:  DEPAKOTE ER   Dose:  500 mg        Take 500 mg by mouth At Bedtime   Refills:  0        docusate sodium 100 MG  capsule   Commonly known as:  COLACE   Dose:  100 mg        Take 1 capsule (100 mg) by mouth At Bedtime   Refills:  0        GABAPENTIN PO   Dose:  900 mg        Take 900 mg by mouth At Bedtime   Refills:  0        traZODone 100 MG tablet   Commonly known as:  DESYREL   Dose:  200 mg        Take 2 tablets (200 mg) by mouth At Bedtime   Refills:  0                Prescriptions were sent or printed at these locations (2 Prescriptions)                   Other Prescriptions                Printed at Department/Unit printer (2 of 2)         naproxen (NAPROSYN) 500 MG tablet               traMADol (ULTRAM) 50 MG tablet                Procedures and tests performed during your visit     Basic metabolic panel    CBC with platelets differential    CRP inflammation    CT Abdomen Pelvis w Contrast    Peripheral IV: Standard    UA with Microscopic      Orders Needing Specimen Collection     None      Pending Results     No orders found from 5/15/2017 to 5/18/2017.            Pending Culture Results     No orders found from 5/15/2017 to 5/18/2017.            Pending Results Instructions     If you had any lab results that were not finalized at the time of your Discharge, you can call the ED Lab Result RN at [phone]. You will be contacted by this team for any positive Lab results or changes in treatment. The nurses are available 7 days a week from 10A to 6:30P.  You can leave a message 24 hours per day and they will return your call.        Test Results From Your Hospital Stay        5/17/2017 12:32 PM      Component Results     Component Value Ref Range & Units Status    WBC 3.6 (L) 4.0 - 11.0 10e9/L Final    RBC Count 3.54 (L) 3.8 - 5.2 10e12/L Final    Hemoglobin 11.3 (L) 11.7 - 15.7 g/dL Final    Hematocrit 33.4 (L) 35.0 - 47.0 % Final    MCV 94 78 - 100 fl Final    MCH 31.9 26.5 - 33.0 pg Final    MCHC 33.8 31.5 - 36.5 g/dL Final    RDW 12.7 10.0 - 15.0 % Final    Platelet Count 219 150 - 450 10e9/L Final    Diff Method  Automated Method  Final    % Neutrophils 58.7 % Final    % Lymphocytes 30.4 % Final    % Monocytes 8.3 % Final    % Eosinophils 1.7 % Final    % Basophils 0.6 % Final    % Immature Granulocytes 0.3 % Final    Nucleated RBCs 0 0 /100 Final    Absolute Neutrophil 2.1 1.6 - 8.3 10e9/L Final    Absolute Lymphocytes 1.1 0.8 - 5.3 10e9/L Final    Absolute Monocytes 0.3 0.0 - 1.3 10e9/L Final    Absolute Eosinophils 0.1 0.0 - 0.7 10e9/L Final    Absolute Basophils 0.0 0.0 - 0.2 10e9/L Final    Abs Immature Granulocytes 0.0 0 - 0.4 10e9/L Final    Absolute Nucleated RBC 0.0  Final         5/17/2017 12:50 PM      Component Results     Component Value Ref Range & Units Status    Sodium 140 133 - 144 mmol/L Final    Potassium 3.8 3.4 - 5.3 mmol/L Final    Chloride 108 94 - 109 mmol/L Final    Carbon Dioxide 27 20 - 32 mmol/L Final    Anion Gap 5 3 - 14 mmol/L Final    Glucose 104 (H) 70 - 99 mg/dL Final    Urea Nitrogen 14 7 - 30 mg/dL Final    Creatinine 0.68 0.52 - 1.04 mg/dL Final    GFR Estimate >90  Non  GFR Calc   >60 mL/min/1.7m2 Final    GFR Estimate If Black >90   GFR Calc   >60 mL/min/1.7m2 Final    Calcium 8.4 (L) 8.5 - 10.1 mg/dL Final         5/17/2017 12:44 PM      Component Results     Component Value Ref Range & Units Status    Color Urine Yellow  Final    Appearance Urine Clear  Final    Glucose Urine Negative NEG mg/dL Final    Bilirubin Urine Negative NEG Final    Ketones Urine Negative NEG mg/dL Final    Specific Gravity Urine 1.021 1.003 - 1.035 Final    Blood Urine Trace (A) NEG Final    pH Urine 6.5 5.0 - 7.0 pH Final    Protein Albumin Urine Negative NEG mg/dL Final    Urobilinogen mg/dL Normal 0.0 - 2.0 mg/dL Final    Nitrite Urine Negative NEG Final    Leukocyte Esterase Urine Moderate (A) NEG Final    Source Midstream Urine  Final    WBC Urine 2 0 - 2 /HPF Final    RBC Urine 3 (H) 0 - 2 /HPF Final    Bacteria Urine Few (A) NEG /HPF Final    Squamous Epithelial /HPF  Urine 2 (H) 0 - 1 /HPF Final         5/17/2017  1:40 PM      Narrative     CT ABDOMEN PELVIS W CONTRAST 5/17/2017 1:35 PM    HISTORY: Abdominal pain.    TECHNIQUE: CT of the abdomen and pelvis is performed with 75 mL Isovue  -370 intravenously. Radiation dose for this scan was reduced using  automated exposure control, adjustment of the mA and/or kV according  to patient size, or iterative reconstruction technique.    COMPARISON: September 4, 2013.    FINDINGS:    Liver: Normal.  Gallbladder:Normal.  Pancreas:Normal.  Spleen:Normal.  Adrenals:Normal.  Ascites:None.    Kidneys:Normal.  Bladder:Normal.  Pelvic free fluid:None.    Bowels:Unremarkable.  No evidence of obstruction.  Appendix:Normal.    Abdominal or pelvic lymphadenopathy:None.    Miscellaneous findings:None.        Impression     IMPRESSION:  Unremarkable CT scan of the abdomen and pelvis.    DEYANIRA AMOS MD         5/17/2017 12:50 PM      Component Results     Component Value Ref Range & Units Status    CRP Inflammation <2.9 0.0 - 8.0 mg/L Final                Clinical Quality Measure: Blood Pressure Screening     Your blood pressure was checked while you were in the emergency department today. The last reading we obtained was  BP: 110/71 . Please read the guidelines below about what these numbers mean and what you should do about them.  If your systolic blood pressure (the top number) is less than 120 and your diastolic blood pressure (the bottom number) is less than 80, then your blood pressure is normal. There is nothing more that you need to do about it.  If your systolic blood pressure (the top number) is 120-139 or your diastolic blood pressure (the bottom number) is 80-89, your blood pressure may be higher than it should be. You should have your blood pressure rechecked within a year by a primary care provider.  If your systolic blood pressure (the top number) is 140 or greater or your diastolic blood pressure (the bottom number) is 90 or  greater, you may have high blood pressure. High blood pressure is treatable, but if left untreated over time it can put you at risk for heart attack, stroke, or kidney failure. You should have your blood pressure rechecked by a primary care provider within the next 4 weeks.  If your provider in the emergency department today gave you specific instructions to follow-up with your doctor or provider even sooner than that, you should follow that instruction and not wait for up to 4 weeks for your follow-up visit.        Thank you for choosing [facility]       Thank you for choosing [facility] for your care. Our goal is always to provide you with excellent care. Hearing back from our patients is one way we can continue to improve our services. Please take a few minutes to complete the written survey that you may receive in the mail after you visit with us. Thank you!        [email or web address] Information     [employer] gives you secure access to your electronic health record. If you see a primary care provider, you can also send messages to your care team and make appointments. If you have questions, please call your primary care clinic.  If you do not have a primary care provider, please call [phone] and they will assist you.        Care EveryWhere ID     This is your Care EveryWhere ID. This could be used by other organizations to access your [facility] medical records  [id number]        After Visit Summary       This is your record. Keep this with you and show to your community pharmacist(s) and doctor(s) at your next visit.

## 2017-05-17 NOTE — ED NOTES
Bed: ED09  Expected date: 5/17/17  Expected time: 11:49 AM  Means of arrival: Ambulance  Comments:  E2 51f abd pain

## 2017-05-19 ENCOUNTER — HOSPITAL ENCOUNTER (OUTPATIENT)
Dept: MAMMOGRAPHY | Facility: CLINIC | Age: 52
Discharge: HOME | End: 2017-05-19
Attending: INTERNAL MEDICINE | Admitting: INTERNAL MEDICINE
Payer: COMMERCIAL

## 2017-05-19 DIAGNOSIS — Z12.31 VISIT FOR SCREENING MAMMOGRAM: ICD-10-CM

## 2017-05-19 PROCEDURE — G0202 SCR MAMMO BI INCL CAD: HCPCS

## 2017-05-23 ENCOUNTER — OFFICE VISIT (OUTPATIENT)
Dept: FAMILY MEDICINE | Facility: CLINIC | Age: 52
End: 2017-05-23
Payer: COMMERCIAL

## 2017-05-23 VITALS
TEMPERATURE: 96.9 F | HEART RATE: 74 BPM | WEIGHT: 156 LBS | BODY MASS INDEX: 23.72 KG/M2 | SYSTOLIC BLOOD PRESSURE: 120 MMHG | DIASTOLIC BLOOD PRESSURE: 79 MMHG | OXYGEN SATURATION: 98 %

## 2017-05-23 DIAGNOSIS — F33.0 MAJOR DEPRESSIVE DISORDER, RECURRENT EPISODE, MILD (H): ICD-10-CM

## 2017-05-23 DIAGNOSIS — N83.201 CYST OF OVARY, RIGHT: ICD-10-CM

## 2017-05-23 DIAGNOSIS — R10.31 ABDOMINAL PAIN, RIGHT LOWER QUADRANT: Primary | ICD-10-CM

## 2017-05-23 DIAGNOSIS — F31.81 BIPOLAR II DISORDER, MOST RECENT EPISODE MAJOR DEPRESSIVE (H): ICD-10-CM

## 2017-05-23 DIAGNOSIS — N80.9 ENDOMETRIOSIS: ICD-10-CM

## 2017-05-23 LAB
ALBUMIN UR-MCNC: NEGATIVE MG/DL
APPEARANCE UR: CLEAR
BACTERIA #/AREA URNS HPF: ABNORMAL /HPF
BILIRUB UR QL STRIP: NEGATIVE
COLOR UR AUTO: YELLOW
GLUCOSE UR STRIP-MCNC: NEGATIVE MG/DL
HGB UR QL STRIP: ABNORMAL
KETONES UR STRIP-MCNC: NEGATIVE MG/DL
LEUKOCYTE ESTERASE UR QL STRIP: ABNORMAL
NITRATE UR QL: NEGATIVE
NON-SQ EPI CELLS #/AREA URNS LPF: ABNORMAL /LPF
PH UR STRIP: 7 PH (ref 5–7)
RBC #/AREA URNS AUTO: ABNORMAL /HPF (ref 0–2)
SP GR UR STRIP: 1.02 (ref 1–1.03)
URN SPEC COLLECT METH UR: ABNORMAL
UROBILINOGEN UR STRIP-ACNC: 1 EU/DL (ref 0.2–1)
WBC #/AREA URNS AUTO: ABNORMAL /HPF (ref 0–2)

## 2017-05-23 PROCEDURE — 99214 OFFICE O/P EST MOD 30 MIN: CPT | Performed by: INTERNAL MEDICINE

## 2017-05-23 PROCEDURE — 81001 URINALYSIS AUTO W/SCOPE: CPT | Performed by: INTERNAL MEDICINE

## 2017-05-23 RX ORDER — BREXPIPRAZOLE 4 MG/1
1 TABLET ORAL
COMMUNITY
Start: 2017-05-21 | End: 2017-05-30

## 2017-05-23 NOTE — PATIENT INSTRUCTIONS
[facility]:  [address] , [place] [address]  [phone]

## 2017-05-23 NOTE — MR AVS SNAPSHOT
After Visit Summary   5/23/2017    Andie Elkins    MRN: [id number]           Patient Information     Date Of Birth          1965        Visit Information        Provider Department      5/23/2017 4:20 PM Christina [facility]aguir[facility] MD [facility]        Today's Diagnoses     Abdominal pain, right lower quadrant    -  1    Cyst of ovary, right        Endometriosis          Care Instructions    [facility]:  [address], [place] [address]  [phone]          Follow-ups after your visit        Your next 10 appointments already scheduled     May 30, 2017  4:20 PM CDT   [facility]    [address] [address] [phone]              Future tests that were ordered for you today     Open Future Orders        Priority Expected Expires Ordered    US Pelvic Complete w Transvaginal Routine  5/23/2018 5/23/2017            Who to contact     If you have questions or need follow up information about today's clinic visit or your schedule please contact [facility] [place] directly at [phone].  Normal or non-critical lab and imaging results will be communicated to you by MyChart, letter or phone within 4 business days after the clinic has received the results. If you do not hear from us within 7 days, please contact the clinic through Open-Plughart or phone. If you have a critical or abnormal lab result, we will notify you by phone as soon as possible.  Submit refill requests through EMcube or call your pharmacy and they will forward the refill request to us. Please allow 3 business days for your refill to be completed.          Additional Information About Your Visit        MyChart Information     EMc[employer] gives you secure access to your electronic health record. If you see a primary care provider, you can also send messages to your care team and make appointments. If  you have questions, please call your primary care clinic.  If you do not have a primary care provider, please call [phone] and they will assist you.        Care EveryWhere ID     This is your Care EveryWhere ID. This could be used by other organizations to access your [facility] medical records  [id number]        Your Vitals Were     Pulse Temperature Last Period Pulse Oximetry BMI (Body Mass Index)       74 96.9  F (36.1  C) (Oral) 05/29/2008 98% 23.72 kg/m2        Blood Pressure from Last 3 Encounters:   05/23/17 120/79   05/17/17 124/70   05/02/17 117/76    Weight from Last 3 Encounters:   05/23/17 156 lb (70.8 kg)   05/17/17 149 lb (67.6 kg)   05/02/17 154 lb 6.4 oz (70 kg)              We Performed the Following     *UA reflex to Microscopic and Culture ([place] and [facility]3] (except [place] and [place])        Primary Care Provider Office Phone # Fax #    Christina Izaguirre [phone]       [facility] [place] [address]  [place] [address]        Thank you!     Thank you for choosing [facility]  for your care. Our goal is always to provide you with excellent care. Hearing back from our patients is one way we can continue to improve our services. Please take a few minutes to complete the written survey that you may receive in the mail after your visit with us. Thank you!             Your Updated Medication List - Protect others around you: Learn how to safely use, store and throw away your medicines at www.disposemymeds.org.          This list is accurate as of: 5/23/17  4:51 PM.  Always use your most recent med list.                   Brand Name Dispense Instructions for use    divalproex 500 MG 24 hr tablet    DEPAKOTE ER     Take 500 mg by mouth At Bedtime       docusate sodium 100 MG capsule    COLACE     Take 1 capsule (100 mg) by mouth At Bedtime       GABAPENTIN PO      Take 900 mg by mouth At Bedtime       naproxen 500 MG tablet     NAPROSYN    16 tablet    Take 1 tablet (500 mg) by mouth 2 times daily (with meals) for 8 days       REXULTI 4 MG tablet   Generic drug:  brexpiprazole      1 mg       traZODone 100 MG tablet    DESYREL     Take 2 tablets (200 mg) by mouth At Bedtime

## 2017-05-23 NOTE — PROGRESS NOTES
SUBJECTIVE:                                                    Andie Elkins is a 51 year old female who presents to clinic today for the following health issues:      ED/UC Followup:    Facility:  [facility]   Date of visit: 5/17/2017  Reason for visit: sharp abdominal pain   Current Status:  Still having slight pain on right lower abdomen      Was seen in the ER for acute onset RLQ pain. CT Abdomen/pelvis normal. Still having pain in RLQ. Reports a history of endometriosis and ovarian cysts but her LMP was about 9 years ago.    Starting DBT next Thursday in [place].       Problem list and histories reviewed & adjusted, as indicated.  Additional history: as documented    Patient Active Problem List   Diagnosis     CARDIOVASCULAR SCREENING; LDL GOAL LESS THAN 160     Lumbago     Hyperlipidemia LDL goal <160     Left shoulder pain     Bipolar II disorder, most recent episode major depressive (H)     Major depressive disorder, recurrent episode, mild (H)     Gastroesophageal reflux disease, esophagitis presence not specified     Nausea     Personal history of malignant neoplasm of breast     Suicidal ideation     TIA (transient ischemic attack)     Past Surgical History:   Procedure Laterality Date     C ANESTH,LOWER ARM SURGERY      reconstruction. fracture     GYN SURGERY  endometriosis     LUMPECTOMY BREAST Left 2005     ORTHOPEDIC SURGERY         Social History   Substance Use Topics     Smoking status: Former Smoker     Packs/day: 3.00     Years: 4.00     Quit date: 1/1/1985     Smokeless tobacco: Never Used     Alcohol use 0.0 oz/week     0 Standard drinks or equivalent per week      Comment: occasional     Family History   Problem Relation Age of Onset     Lipids Mother      Hypertension Mother      CEREBROVASCULAR DISEASE Mother      Depression Mother      DIABETES Mother 73     HEART DISEASE Mother 73     heart mumur     C.A.D. Father      CABGx4     DIABETES Father      Alcohol/Drug Father       CANCER Father      lung cancer     Dementia Father      C.A.D. Maternal Grandfather      open heart surgery     DIABETES Maternal Grandfather      CANCER Maternal Grandfather      Alcohol/Drug Maternal Grandfather      Hypertension Maternal Grandmother      DIABETES Maternal Grandmother      Breast Cancer Maternal Grandmother      Circulatory Paternal Uncle      Blood clots     Neurologic Disorder Sister      Epilepsy     DIABETES Paternal Grandmother      DIABETES Paternal Grandfather      Alcohol/Drug Paternal Grandfather      Breast Cancer Maternal Aunt      Breast Cancer Maternal Aunt      Breast Cancer Maternal Aunt      CANCER Maternal Uncle      Throat     CANCER Maternal Uncle      Jaw     Alcohol/Drug Brother      CEREBROVASCULAR DISEASE Sister      Myocardial Infarction Brother 59     Cancer - colorectal Maternal Uncle            Reviewed and updated as needed this visit by clinical staff       Reviewed and updated as needed this visit by Provider         ROS:  Constitutional, HEENT, cardiovascular, pulmonary, gi and gu systems are negative, except as otherwise noted.    OBJECTIVE:                                                    /79 (BP Location: Left arm, Cuff Size: Adult Regular)  Pulse 74  Temp 96.9  F (36.1  C) (Oral)  Wt 156 lb (70.8 kg)  LMP 05/29/2008  SpO2 98%  BMI 23.72 kg/m2  Body mass index is 23.72 kg/(m^2).  GENERAL: healthy, alert and no distress  NECK: no adenopathy, no asymmetry, masses, or scars and thyroid normal to palpation  RESP: lungs clear to auscultation - no rales, rhonchi or wheezes  CV: regular rate and rhythm, normal S1 S2, no S3 or S4, no murmur, click or rub, no peripheral edema and peripheral pulses strong  ABDOMEN: Soft, normal bowel sounds, tender in RLQ with some guarding but no rebound  MS: no gross musculoskeletal defects noted, no edema    Diagnostic Test Results:  Reviewed in EPIC     ASSESSMENT/PLAN:                                                     Andie is a 50 y/o female with remote history of endometriosis and ovarian cysts who presents with acute onset of RLQ pain. She is postmenopausal so ovarian cyst is unlikely, but will rule out any other ovarian pathology with a pelvic ultrasound.     1. Abdominal pain, right lower quadrant  - US Pelvic Complete w Transvaginal; Future  - *UA reflex to Microscopic and Culture ([place] and [facility] (except [place] and [place])  - Urine Microscopic    2. Cyst of ovary, right  - US Pelvic Complete w Transvaginal; Future    3. Endometriosis  - US Pelvic Complete w Transvaginal; Future    4. Bipolar II disorder, most recent episode major depressive (H)  Starting DBP treatment next week.     5. Major depressive disorder, recurrent episode, mild (H)      Depending on imaging results, will notify patient and determine appropriate follow up      Christina Izaguirre MD  [facility] [place]

## 2017-05-30 ENCOUNTER — HOSPITAL ENCOUNTER (OUTPATIENT)
Facility: CLINIC | Age: 52
Setting detail: OBSERVATION
Discharge: HOME | End: 2017-05-31
Attending: EMERGENCY MEDICINE | Admitting: INTERNAL MEDICINE
Payer: COMMERCIAL

## 2017-05-30 ENCOUNTER — APPOINTMENT (OUTPATIENT)
Dept: CT IMAGING | Facility: CLINIC | Age: 52
End: 2017-05-30
Attending: EMERGENCY MEDICINE
Payer: COMMERCIAL

## 2017-05-30 DIAGNOSIS — R56.9 SEIZURES (H): ICD-10-CM

## 2017-05-30 LAB
AMPHETAMINES UR QL SCN: NORMAL
ANION GAP SERPL CALCULATED.3IONS-SCNC: 7 MMOL/L (ref 3–14)
BARBITURATES UR QL: NORMAL
BASOPHILS # BLD AUTO: 0 10E9/L (ref 0–0.2)
BASOPHILS NFR BLD AUTO: 0.4 %
BENZODIAZ UR QL: NORMAL
BUN SERPL-MCNC: 18 MG/DL (ref 7–30)
CALCIUM SERPL-MCNC: 8.6 MG/DL (ref 8.5–10.1)
CANNABINOIDS UR QL SCN: NORMAL
CHLORIDE SERPL-SCNC: 107 MMOL/L (ref 94–109)
CO2 SERPL-SCNC: 27 MMOL/L (ref 20–32)
COCAINE UR QL: NORMAL
CREAT SERPL-MCNC: 0.57 MG/DL (ref 0.52–1.04)
DIFFERENTIAL METHOD BLD: ABNORMAL
EOSINOPHIL # BLD AUTO: 0.1 10E9/L (ref 0–0.7)
EOSINOPHIL NFR BLD AUTO: 2.9 %
ERYTHROCYTE [DISTWIDTH] IN BLOOD BY AUTOMATED COUNT: 12.5 % (ref 10–15)
ETHANOL SERPL-MCNC: <0.01 G/DL
GFR SERPL CREATININE-BSD FRML MDRD: ABNORMAL ML/MIN/1.7M2
GLUCOSE SERPL-MCNC: 107 MG/DL (ref 70–99)
HCT VFR BLD AUTO: 35.1 % (ref 35–47)
HGB BLD-MCNC: 12.1 G/DL (ref 11.7–15.7)
IMM GRANULOCYTES # BLD: 0 10E9/L (ref 0–0.4)
IMM GRANULOCYTES NFR BLD: 0.2 %
LYMPHOCYTES # BLD AUTO: 1.6 10E9/L (ref 0.8–5.3)
LYMPHOCYTES NFR BLD AUTO: 34.1 %
MCH RBC QN AUTO: 32.7 PG (ref 26.5–33)
MCHC RBC AUTO-ENTMCNC: 34.5 G/DL (ref 31.5–36.5)
MCV RBC AUTO: 95 FL (ref 78–100)
MONOCYTES # BLD AUTO: 0.5 10E9/L (ref 0–1.3)
MONOCYTES NFR BLD AUTO: 11.4 %
NEUTROPHILS # BLD AUTO: 2.3 10E9/L (ref 1.6–8.3)
NEUTROPHILS NFR BLD AUTO: 51 %
NRBC # BLD AUTO: 0 10*3/UL
NRBC BLD AUTO-RTO: 0 /100
OPIATES UR QL SCN: NORMAL
PCP UR QL SCN: NORMAL
PLATELET # BLD AUTO: 244 10E9/L (ref 150–450)
POTASSIUM SERPL-SCNC: 3.7 MMOL/L (ref 3.4–5.3)
RBC # BLD AUTO: 3.7 10E12/L (ref 3.8–5.2)
SODIUM SERPL-SCNC: 141 MMOL/L (ref 133–144)
WBC # BLD AUTO: 4.6 10E9/L (ref 4–11)

## 2017-05-30 PROCEDURE — 99285 EMERGENCY DEPT VISIT HI MDM: CPT | Mod: 25

## 2017-05-30 PROCEDURE — 80320 DRUG SCREEN QUANTALCOHOLS: CPT | Performed by: EMERGENCY MEDICINE

## 2017-05-30 PROCEDURE — 80048 BASIC METABOLIC PNL TOTAL CA: CPT | Performed by: EMERGENCY MEDICINE

## 2017-05-30 PROCEDURE — 99220 ZZC INITIAL OBSERVATION CARE,LEVL III: CPT | Performed by: INTERNAL MEDICINE

## 2017-05-30 PROCEDURE — 96374 THER/PROPH/DIAG INJ IV PUSH: CPT

## 2017-05-30 PROCEDURE — 70450 CT HEAD/BRAIN W/O DYE: CPT

## 2017-05-30 PROCEDURE — 80164 ASSAY DIPROPYLACETIC ACD TOT: CPT | Performed by: EMERGENCY MEDICINE

## 2017-05-30 PROCEDURE — 85025 COMPLETE CBC W/AUTO DIFF WBC: CPT | Performed by: EMERGENCY MEDICINE

## 2017-05-30 PROCEDURE — 25000128 H RX IP 250 OP 636: Performed by: EMERGENCY MEDICINE

## 2017-05-30 PROCEDURE — 80307 DRUG TEST PRSMV CHEM ANLYZR: CPT | Performed by: EMERGENCY MEDICINE

## 2017-05-30 RX ORDER — AMOXICILLIN 250 MG
1-2 CAPSULE ORAL 2 TIMES DAILY PRN
Status: DISCONTINUED | OUTPATIENT
Start: 2017-05-30 | End: 2017-05-31 | Stop reason: HOSPADM

## 2017-05-30 RX ORDER — ONDANSETRON 4 MG/1
4 TABLET, ORALLY DISINTEGRATING ORAL EVERY 6 HOURS PRN
Status: DISCONTINUED | OUTPATIENT
Start: 2017-05-30 | End: 2017-05-31 | Stop reason: HOSPADM

## 2017-05-30 RX ORDER — LORAZEPAM 2 MG/ML
2 INJECTION INTRAMUSCULAR
Status: DISCONTINUED | OUTPATIENT
Start: 2017-05-30 | End: 2017-05-31 | Stop reason: HOSPADM

## 2017-05-30 RX ORDER — TRAZODONE HYDROCHLORIDE 100 MG/1
200 TABLET ORAL AT BEDTIME
Status: DISCONTINUED | OUTPATIENT
Start: 2017-05-30 | End: 2017-05-31 | Stop reason: HOSPADM

## 2017-05-30 RX ORDER — DIVALPROEX SODIUM 500 MG/1
500 TABLET, EXTENDED RELEASE ORAL AT BEDTIME
Status: DISCONTINUED | OUTPATIENT
Start: 2017-05-31 | End: 2017-05-31 | Stop reason: HOSPADM

## 2017-05-30 RX ORDER — LORAZEPAM 2 MG/ML
0.5 INJECTION INTRAMUSCULAR ONCE
Status: COMPLETED | OUTPATIENT
Start: 2017-05-30 | End: 2017-05-30

## 2017-05-30 RX ORDER — ACETAMINOPHEN 325 MG/1
650 TABLET ORAL EVERY 4 HOURS PRN
Status: DISCONTINUED | OUTPATIENT
Start: 2017-05-30 | End: 2017-05-31 | Stop reason: HOSPADM

## 2017-05-30 RX ORDER — BISACODYL 10 MG
10 SUPPOSITORY, RECTAL RECTAL DAILY PRN
Status: DISCONTINUED | OUTPATIENT
Start: 2017-05-30 | End: 2017-05-31 | Stop reason: HOSPADM

## 2017-05-30 RX ORDER — PROCHLORPERAZINE 25 MG
25 SUPPOSITORY, RECTAL RECTAL EVERY 12 HOURS PRN
Status: DISCONTINUED | OUTPATIENT
Start: 2017-05-30 | End: 2017-05-31 | Stop reason: HOSPADM

## 2017-05-30 RX ORDER — GABAPENTIN 600 MG/1
1200 TABLET ORAL AT BEDTIME
Status: DISCONTINUED | OUTPATIENT
Start: 2017-05-31 | End: 2017-05-31 | Stop reason: HOSPADM

## 2017-05-30 RX ORDER — ONDANSETRON 2 MG/ML
4 INJECTION INTRAMUSCULAR; INTRAVENOUS EVERY 6 HOURS PRN
Status: DISCONTINUED | OUTPATIENT
Start: 2017-05-30 | End: 2017-05-31 | Stop reason: HOSPADM

## 2017-05-30 RX ORDER — NALOXONE HYDROCHLORIDE 0.4 MG/ML
.1-.4 INJECTION, SOLUTION INTRAMUSCULAR; INTRAVENOUS; SUBCUTANEOUS
Status: DISCONTINUED | OUTPATIENT
Start: 2017-05-30 | End: 2017-05-31 | Stop reason: HOSPADM

## 2017-05-30 RX ORDER — LIDOCAINE 40 MG/G
CREAM TOPICAL
Status: DISCONTINUED | OUTPATIENT
Start: 2017-05-30 | End: 2017-05-31 | Stop reason: HOSPADM

## 2017-05-30 RX ORDER — POLYETHYLENE GLYCOL 3350 17 G/17G
17 POWDER, FOR SOLUTION ORAL DAILY PRN
Status: DISCONTINUED | OUTPATIENT
Start: 2017-05-30 | End: 2017-05-31 | Stop reason: HOSPADM

## 2017-05-30 RX ORDER — PROCHLORPERAZINE MALEATE 5 MG
5-10 TABLET ORAL EVERY 6 HOURS PRN
Status: DISCONTINUED | OUTPATIENT
Start: 2017-05-30 | End: 2017-05-31 | Stop reason: HOSPADM

## 2017-05-30 RX ADMIN — LORAZEPAM 0.5 MG: 2 INJECTION INTRAMUSCULAR; INTRAVENOUS at 21:48

## 2017-05-30 ASSESSMENT — ENCOUNTER SYMPTOMS
CONFUSION: 1
SEIZURES: 1
NERVOUS/ANXIOUS: 1
SLEEP DISTURBANCE: 1

## 2017-05-30 NOTE — IP AVS SNAPSHOT
[address] [facility]    [address]    [place] [address]    Phone:  [phone]                                       After Visit Summary   5/30/2017    Andie Elkins    MRN: [id number]           After Visit Summary Signature Page     I have received my discharge instructions, and my questions have been answered. I have discussed any challenges I see with this plan with the nurse or doctor.    ..........................................................................................................................................  Patient/Patient Representative Signature      ..........................................................................................................................................  Patient Representative Print Name and Relationship to Patient    ..................................................               ................................................  Date                                            Time    ..........................................................................................................................................  Reviewed by Signature/Title    ...................................................              ..............................................  Date                                                            Time

## 2017-05-30 NOTE — IP AVS SNAPSHOT
MRN:[id number]                      After Visit Summary   5/30/2017    Andie Elkins    MRN: [id number]           Thank you!     Thank you for choosing [facility] for your care. Our goal is always to provide you with excellent care. Hearing back from our patients is one way we can continue to improve our services. Please take a few minutes to complete the written survey that you may receive in the mail after you visit with us. Thank you!        Patient Information     Date Of Birth          1965        Designated Caregiver       Most Recent Value    Caregiver    Will someone help with your care after discharge? yes    Name of designated caregiver Lana    Phone number of caregiver [phone]    Caregiver address on file      About your hospital stay     You were admitted on:  May 30, 2017 [facility] [address] [facility]    You were discharged on:  May 31, 2017        Reason for your hospital stay       For evaluation of a spell.                  Who to Call     For medical emergencies, please call 911.  For non-urgent questions about your medical care, please call your primary care provider or clinic, [phone]          Attending Provider     Provider Specialty    Zacarias Lyon DO Emergency Medicine    Santillan, Raghu Nye MD Internal Medicine       Primary Care Provider Office Phone # Fax #    Christina Izaguirre [phone]      After Care Instructions     Activity       Your activity upon discharge: activity as tolerated            Diet       Follow this diet upon discharge: Orders Placed This Encounter      Regular Diet Adult                  Follow-up Appointments     Follow-up and recommended labs and tests        Follow up with primary care provider, Christina Izaguirre, within 7 days for hospital follow- up.                  Pending Results     No orders found for last 3 day(s).            Statement of Approval      "Ordered          05/31/17 1538  I have reviewed and agree with all the recommendations and orders detailed in this document.  EFFECTIVE NOW     Approved and electronically signed by:  Zacarias Nichols MD             Admission Information     Date & Time Provider Department Dept. Phone    5/30/2017 Raghu Santillan MD [place] [address] [facility] [phone]      Your Vitals Were     Blood Pressure Pulse Temperature Respirations Height Weight    102/64 (BP Location: Right arm) 73 98.6  F (37  C) (Oral) 16 1.626 m (5' 4\") 66.7 kg (147 lb)    Last Period Pulse Oximetry BMI (Body Mass Index)             05/29/2008 96% 25.23 kg/m2         [employer] Information     [employer] gives you secure access to your electronic health record. If you see a primary care provider, you can also send messages to your care team and make appointments. If you have questions, please call your primary care clinic.  If you do not have a primary care provider, please call [phone] and they will assist you.        Care EveryWhere ID     This is your Care EveryWhere ID. This could be used by other organizations to access your [place] medical records  [id number]           Review of your medicines      CONTINUE these medicines which have NOT CHANGED        Dose / Directions    CLONAZEPAM PO   Indication:  Panic Disorder        Dose:  0.5 mg   Take 0.5 mg by mouth daily as needed for anxiety   Refills:  0       divalproex 500 MG 24 hr tablet   Commonly known as:  DEPAKOTE ER        Dose:  500 mg   Take 500 mg by mouth At Bedtime   Refills:  0       docusate sodium 100 MG capsule   Commonly known as:  COLACE        Dose:  100 mg   Take 1 capsule (100 mg) by mouth At Bedtime   Refills:  0       GABAPENTIN PO        Dose:  1200 mg   Take 1,200 mg by mouth At Bedtime   Refills:  0       REXULTI PO        Dose:  4 mg   Take 4 mg by mouth At Bedtime   Refills:  0       traZODone 100 MG tablet   Commonly known as:  DESYREL        " Dose:  200 mg   Take 2 tablets (200 mg) by mouth At Bedtime   Refills:  0                Protect others around you: Learn how to safely use, store and throw away your medicines at www.disposemymeds.org.             Medication List: This is a list of all your medications and when to take them. Check marks below indicate your daily home schedule. Keep this list as a reference.      Medications           Morning Afternoon Evening Bedtime As Needed    CLONAZEPAM PO   Take 0.5 mg by mouth daily as needed for anxiety                                   divalproex 500 MG 24 hr tablet   Commonly known as:  DEPAKOTE ER   Take 500 mg by mouth At Bedtime   Last time this was given:  500 mg on 5/31/2017 12:09 AM   Next Dose Due:  Tomorrow at bedtime                                   docusate sodium 100 MG capsule   Commonly known as:  COLACE   Take 1 capsule (100 mg) by mouth At Bedtime                                GABAPENTIN PO   Take 1,200 mg by mouth At Bedtime   Last time this was given:  1,200 mg on 5/31/2017 12:09 AM   Next Dose Due:  Tomorrow at bedtime                                   REXULTI PO   Take 4 mg by mouth At Bedtime   Last time this was given:  4 mg on 5/31/2017 12:15 AM   Next Dose Due:  Tomorrow at bedtime                                   traZODone 100 MG tablet   Commonly known as:  DESYREL   Take 2 tablets (200 mg) by mouth At Bedtime   Last time this was given:  200 mg on 5/31/2017 12:15 AM   Next Dose Due:  Tomorrow at bedtime

## 2017-05-31 VITALS
DIASTOLIC BLOOD PRESSURE: 64 MMHG | HEART RATE: 73 BPM | RESPIRATION RATE: 16 BRPM | WEIGHT: 147 LBS | HEIGHT: 64 IN | OXYGEN SATURATION: 96 % | TEMPERATURE: 98.6 F | SYSTOLIC BLOOD PRESSURE: 102 MMHG | BODY MASS INDEX: 25.1 KG/M2

## 2017-05-31 LAB — VALPROATE SERPL-MCNC: 42 MG/L (ref 50–100)

## 2017-05-31 PROCEDURE — 40000061 ZZH STATISTIC EEG TIME EA 10 MIN

## 2017-05-31 PROCEDURE — 99217 ZZC OBSERVATION CARE DISCHARGE: CPT | Performed by: HOSPITALIST

## 2017-05-31 PROCEDURE — 95816 EEG AWAKE AND DROWSY: CPT

## 2017-05-31 PROCEDURE — 25000132 ZZH RX MED GY IP 250 OP 250 PS 637: Performed by: INTERNAL MEDICINE

## 2017-05-31 PROCEDURE — G0378 HOSPITAL OBSERVATION PER HR: HCPCS

## 2017-05-31 RX ADMIN — TRAZODONE HYDROCHLORIDE 200 MG: 100 TABLET ORAL at 00:15

## 2017-05-31 RX ADMIN — DIVALPROEX SODIUM 500 MG: 500 TABLET, EXTENDED RELEASE ORAL at 00:09

## 2017-05-31 RX ADMIN — BREXPIPRAZOLE 4 MG: 2 TABLET ORAL at 00:15

## 2017-05-31 RX ADMIN — GABAPENTIN 1200 MG: 600 TABLET, FILM COATED ORAL at 00:09

## 2017-05-31 ASSESSMENT — ACTIVITIES OF DAILY LIVING (ADL)
TOILETING: 0-->INDEPENDENT
RETIRED_EATING: 0-->INDEPENDENT
SWALLOWING: 0-->SWALLOWS FOODS/LIQUIDS WITHOUT DIFFICULTY
TRANSFERRING: 0-->INDEPENDENT
RETIRED_COMMUNICATION: 0-->UNDERSTANDS/COMMUNICATES WITHOUT DIFFICULTY
BATHING: 0-->INDEPENDENT
FALL_HISTORY_WITHIN_LAST_SIX_MONTHS: NO
DRESS: 0-->INDEPENDENT
COGNITION: 0 - NO COGNITION ISSUES REPORTED
AMBULATION: 0-->INDEPENDENT

## 2017-05-31 NOTE — PLAN OF CARE
Problem: Goal Outcome Summary  Goal: Goal Outcome Summary  Outcome: Improving  Pt is A&O X4 Pt is scheduled for EEG in the Am. VSS on room. Neuros intact. Tele SR. Pt is voiding. Continue to monitor

## 2017-05-31 NOTE — ED NOTES
"[facility]  ED Nurse Handoff Report    ED Chief complaint: Seizures (Says she had a seizure today at work, was witnessed by coworkers, unaware if she hit her head at all. Her wife is here with her and states her seizures are related to anxiety normally. is not on meds for seizures. )      ED Diagnosis:   Final diagnoses:   Seizures (H)       Code Status: Full Code    Allergies:   Allergies   Allergen Reactions     Codeine Other (See Comments)     \"knocks me out\"     Penicillins Rash       Activity level - Baseline/Home:  Independent    Activity Level - Current:   Stand with Assist     Needed?: No    Isolation: No  Infection: Not Applicable    Bariatric?: No    Vital Signs:   Vitals:    05/30/17 2019 05/30/17 2209 05/30/17 2210   BP: 120/73 110/70 109/64   Pulse: 89     Resp: 16     Temp: 98.2  F (36.8  C)     TempSrc: Oral     SpO2: 98%  95%   Weight: 66.7 kg (147 lb)     Height: 1.626 m (5' 4\")         Cardiac Rhythm: ,         Pain level: 0-10 Pain Scale: 7 (back of head.)    Is this patient confused?: No    Patient Report: Initial Complaint: Reports having a seizure today at work. Uncertain if she hit her head. Reports that she has seizures when she has anxiety.   Focused Assessment: Cardiac: WNL. Respiratory: WNL. Neuro: Seizure (reported), admitted presumably for EEG   Tests Performed: CBC with diff. CMP. Head CT. Urine (drug screen). ETOH   Abnormal Results: Unremarkable.   Treatments provided: 0.5 mg Lorazepam    Family Comments: Spouse at bedside.     OBS brochure/video discussed/provided to patient: Yes    ED Medications:   Medications   LORazepam (ATIVAN) injection 0.5 mg (0.5 mg Intravenous Given 5/30/17 2148)       Drips infusing?:  No      ED NURSE PHONE NUMBER: [phone]        "

## 2017-05-31 NOTE — CONSULTS
CHIEF COMPLAINT:  Evaluate for spells.      HISTORY OF PRESENT ILLNESS:  Andie Elkins is a 51-year-old right-handed lady who presents for evaluation of spells.  She calls them seizures.  She has several episodes of feeling aura which is sensation of ants crawling in her lower part of the body and then she does not have any recollection of what happens to her.  She does not have any episodes of tongue biting or bowel incontinence.  These episodes occur sporadically.  They always are associated with stress.  There has been a lot of stress at work and some stress at home.  The patient was told that she has pseudoseizure.  She apparently did have an EEG.  She started to have those spells since her early 20s.      The patient states that she has never been formally diagnosed with seizure disorder.  EEG was completed on this admission which is normal.      PAST MEDICAL HISTORY: Underlying medical history shows significant anxiety, bipolar disorder and depression.  Past medical history shows also breast cancer and history of myocardial infarction.      MEDICATIONS:  Prior to admission include Rexulti, clonazepam, gabapentin, Depakote, Colace, and trazodone.      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  She feels very overwhelmed there.  She gets a lot of job assignments.  She also has stress in her marriage  with her wife.      The patient is scheduled to put her dog down the next coming weekend and she is very stressed about that now as well.      FAMILY HISTORY:  Noncontributory to this presentation.      ALLERGIES:  Codeine and penicillin.      Review of organs and systems shows that since admission, the patient had electrolytes which are normal, CBC which is normal, EEG which is normal.  MRI of the brain was done in January when patient presented with left-sided numbness which has been normal.  CT scan of the head was done on this admission which has been normal as well.       PHYSICAL EXAMINATION:     Current examination shows that patient is lying in her bed.  She is in no acute distress.  She does feel nervous and tense describing her symptoms.   VITAL SIGNS:  Blood pressure of 103/46 with respiratory rate 16, temperature 98.7.   GENERAL:  She is in no acute distress.   LUNGS:  Clear to auscultation bilaterally.   CARDIOVASCULAR:  S1, S2 cardiac sounds.  No murmurs or bruits.   EXTREMITIES:  Warm.  Pedal pulses are present.  There is no evidence of pedal edema.   NEUROLOGIC:  The patient is awake and alert x3.  She has clear speech and language functions.  She does have extraocular eye movements in a full range.  There is no nystagmus.  There is full strength in both upper and lower extremities.  Reflexes are symmetrical.  Toes are downgoing.  Sensory examination shows intact sensation to light touch, pinprick, vibration and joint position sensation.  Coordination is intact for finger-nose-finger.  The reminder of the examination is not checked.      IMPRESSION:  This lady presents with spells which are due to her anxiety.  They do represent pseudoseizures and not true electrical seizures.      At this point, I do not recommend to start any new anticonvulsants.  I think patient will benefit from further psychiatric management.  Specifically, I would recommend to consider to start Seroquel.  I would recommend psychiatrist to make recommendations regarding the dose.  Patient does have her own psychiatrist and she would like to contact her psychiatrist for further recommendations.      Patient requested note for work to decrease stress and she also would like to start work on Monday of next week, which will give her time to de-stress and contact her psychiatrist.  I think those are reasonable suggestions for her management and I would ask hospitalist to assist with those notes upon discharge.  From a neurological standpoint, no additional intervention, treatment, or workup is needed.       I discussed all the above with the patient.      She can be discharged from a neurological standpoint.         MACIEL BAKER MD             D: 2017 15:00   T: 2017 15:57   MT: [facility]      Name:     RICARDO OGLESBY   MRN:      [id number]        Account:       [id number]   :      1965           Consult Date:  2017      Document: [id number]

## 2017-05-31 NOTE — H&P
"[facility]    History and Physical  Hospitalist       Date of Admission:  5/30/2017    Assessment & Plan   Andie Elkins is a 51 year old female who presents with a spell consistent with historical episodes with concern for seizure versus pseudoseizure. Patient apparently has been diagnosed with pseudoseizure in the past, though there was initial concern that she has not completed an EEG per report of patient and patient's wife.    Spells: Primary suspicion is for pseudoseizures related to anxiety/stress. Patient describes as being diagnosed with pseudoseizure, though initially states that she has never completed an EEG. Patient's wife states that she had a partial EEG performed in an outside clinic which was unable to be completed as she was unable to sleep. Patient later states that she did have an EEG many years ago in both [place], [place] as well as [place], [place] and is uncertain if she has had additional studies at outside facilities. These records are not available to me currently. Has been told by multiple neurologists that her spells are provoked by anxiety. Note history of \"seizure, nonepileptic: triggered by stress, negative EEG and MRI\" in [facility] problem list dating back to Nov 2014, has been informed in neurology follow-up since January that she has, per patient's own report, pseudoseizures. States that historically she had been on antiepileptic therapy, though had ongoing spells and prior medical therapy was discontinued following diagnosis of pseudoseizure.   -Despite having monthly episodes of LOC related to spells, pt continues to drive. Discussed no driving until free of spells and cleared by neurology.   -Patient will require a return to work note pending EEG and neurology evaluation results.  -1 hour EEG ordered as per neurology  -Neurology consulted for interpretation and recommendations regarding ongoing spells.  -Continue with aggressive management of psychiatric " disease as below. Patient with close psychiatric follow-up.  -Holding prior to admission clonazepam, patient uses infrequently with last dose 3 days ago  -Continue prior to admission gabapentin and Depakote for mood stabilization (not taking for seizure disorder per patient's description). It should be noted that patient continues to have spells despite 1200 h.s. gabapentin and 500 h.s. Depakote.  -Request [facility] neurology clinic records; unclear if outpatient neurology clinic has records of prior EEGs.  -No antiepileptic load as per discussion with neurology.   -Valproic acid level added on    Disordered sleep:  -Continue trazodone 200 mg h.s.    Bipolar type II disorder, anxiety:    -Continue prior to admission brexpiprazole 4 mg HS; patient endorses this being a new medication and has developed some degree of restless legs since initiation. Akathisias and restlessness are side effects of medication, and may be contributing, though will need to be tapered off over several months. Will defer tapering to Primary psychiatrist as patient has close follow-up and will likely need to revisit with her psychiatrist following current hospitalization. It should be noted, however, that patient has endorsed restless leg symptoms dating back to at least October 2016 by chart review during prior admission to [facility].  -As above, continue prior to admission gabapentin and Depakote for mood stabilization.    DVT Prophylaxis: Ambulate every shift    Code Status: Full Code    Disposition: Expected discharge 5/31/17 pending EEG results and neurology consultation; given patient's description of past neurology follow-up and discussions, I believe that she has completed some degree of diagnostic testing for seizure disorder and has a diagnosis of pseudoseizures. Anticipate need for outpatient psychiatry follow-up for reassessment of brexpiprazole and ongoing anxiety induced spells. Admitted to observation status.    Raghu  "[facility]    Primary Care Physician   Christina Izaguirre    Chief Complaint   \"Seizures\"    History is obtained from the patient, chart review, patient's wife at bedside, review of outside records including prior PCP follow-up. I do not note EEG and outside records from [facility], [facility], or [facility]. Discussed with Dr. Lyon of the emergency department as well as briefly with Dr. Chappell of neurology.    History of Present Illness   Andie Elkins is a 51 year old female who presents with a spell with loss of consciousness described by patient as a seizure.    Patient states that she has had seizures since her 20s, precipitated by anxiety/stress. Patient is on both gabapentin and Depakote, though these are apparently mood stabilizing agents for her type II bipolar disorder rather than treatment for seizures. Patient describes being on additional antiepileptics more historically, though these were discontinued when she was diagnosed with pseudoseizures. States that she continued to have spells despite being on antiepileptics. I'm uncertain what prior antiepileptics she is describing.    Patient presents tonight after a stressful day at work where she had an episode including loss of consciousness for approximately 3 minutes. Patient states that she has an aura prior to the spells, told a coworker to bring her to the back room and lay her down. Patient does not recall event, though was told that she awoke after approximately 2-3 minutes. Recalls waking up to find her supervisor helping hold her down as well as her coworkers. EMS arrived, patient declined ambulance transport given cost, and patient's wife brought patient to the emergency department for further evaluation. Patient describes having one to 2 episodes per month, though believes they are increasing. Wife describes violent shaking of entire body during these episodes. Patient has clonazepam for anxiety that she takes infrequently. " Last dose 0.5 mg 3 days ago. Does not take daily. Infrequent and minimal alcohol use. No recent alcohol use. Patient believes she hit the back of her head while seizing. Unclear if there was a true postictal state as no eyewitnesses are present currently. No loss of bowel or bladder function. Patient believes that her anxiety has actually been worsening over the past several months. Follows closely with a Dr. Menezes of psychiatry, which is through a private practice clinic. States that she is able to get into her psychiatrist easily and would be able to follow-up in the coming 1-2 weeks. Patient denies biting her tongue, though states that she has done so in the past during prior spells. Feels that her work environment is somewhat stressful as additional tasks tend to be given to her as she performs well.    Patient states that she continues to drive, has driven since before her onset of spells. States that she has never been told not to drive.    Patient's wife is somewhat concerned that the patient has had limited medical care up until 2015 and has never completed an EEG. Requesting admission for completion of EEG study. Wife states that they attempted to get an EEG through outside neurology clinic in the past year, though were unable to complete this study as patient with difficulty sleeping secondary to her bipolar disorder. I am unable to view these results. Patient states that she has completed EEGs in the past through [facility] on initial concern for seizures approximately 30 years ago as well as in [place], [place] more recently, though still nearly a decade ago. Patient is uncertain what these studies demonstrated. Patient does state, however, that she has been told by neurologists that she does not have epilepsy and is able to tell me that she was diagnosed with pseudoseizures. This suggests that a more complete EEG result is available. Additionally, patient describes being  "diagnosed with pseudoseizures earlier this year in neurology clinic as well as being told her episodes were likely pseudoseizures this January when she was evaluated for possible TIA and neurology was consulted. This said, outside records demonstrate a problem list describing a \"seizure\" disorder which is \"nonepileptic\" since 2014 suggesting that this diagnosis is more historical. Patient's wife wants to ensure that a seizure disorder is \"ruled out,\" though I discussed that this may not be possible with a limited EEG unless an event is captured. Regardless, if patient's quality of life is limited by anxiety currently and there is a thought that patient's spells are pseudoseizures provoked by anxiety, additional treatment of patient's anxiety would be warranted regardless, and may improve frequency of spells.    Patient does use benzodiazepines for anxiety, last use of 0.5 mg approximately 3 days ago. Does not use frequently or daily, suggesting against withdrawal.    Patient's sister with a history of epilepsy diagnosed at age 4. Sister continues to have seizures.    Dr Lyon, ED provider, discussed with Dr. Chappell of neurology, who agreed to EEG monitoring at this time. Of note, patient did receive 0.5 mg Ativan at approximately 9 PM for anxiety. Discussed with patient holding additional doses while EEG monitoring is pursued.    Past Medical History    I have reviewed this patient's medical history and updated it with pertinent information if needed.   Past Medical History:   Diagnosis Date     Breast cancer (H) 2005    Left; lumpectomy + Radiation     Depressive disorder      Myocardial infarction (H) 2013    vasospasm     Past Surgical History   I have reviewed this patient's surgical history and updated it with pertinent information if needed.  Past Surgical History:   Procedure Laterality Date     C ANESTH,LOWER ARM SURGERY      reconstruction. fracture     GYN SURGERY  endometriosis     LUMPECTOMY BREAST " "Left 2005     ORTHOPEDIC SURGERY       Prior to Admission Medications   Prior to Admission Medications   Prescriptions Last Dose Informant Patient Reported? Taking?   Brexpiprazole (REXULTI PO) 5/29/2017 at pm Self Yes Yes   Sig: Take 4 mg by mouth At Bedtime    CLONAZEPAM PO  at prn Self Yes Yes   Sig: Take 0.5 mg by mouth daily as needed for anxiety    GABAPENTIN PO 5/29/2017 at pm Self Yes Yes   Sig: Take 1,200 mg by mouth At Bedtime    divalproex (DEPAKOTE ER) 500 MG 24 hr tablet 5/29/2017 at pm Self Yes Yes   Sig: Take 500 mg by mouth At Bedtime   docusate sodium (COLACE) 100 MG capsule 5/29/2017 at pm Self Yes Yes   Sig: Take 1 capsule (100 mg) by mouth At Bedtime   traZODone (DESYREL) 100 MG tablet 5/29/2017 at hs Self Yes Yes   Sig: Take 2 tablets (200 mg) by mouth At Bedtime      Facility-Administered Medications: None     Allergies   Allergies   Allergen Reactions     Codeine Other (See Comments)     \"knocks me out\"     Penicillins Rash       Social History   I have reviewed this patient's social history and updated it with pertinent information if needed. Andie Elkins  reports that she quit smoking about 32 years ago. She has a 12.00 pack-year smoking history. She has never used smokeless tobacco. She reports that she drinks alcohol. She reports that she does not use illicit drugs.    Family History   I have reviewed this patient's family history and updated it with pertinent information if needed.   Family History   Problem Relation Age of Onset     Lipids Mother      Hypertension Mother      CEREBROVASCULAR DISEASE Mother      Depression Mother      DIABETES Mother 73     HEART DISEASE Mother 73     heart mumur     C.A.D. Father      CABGx4     DIABETES Father      Alcohol/Drug Father      CANCER Father      lung cancer     Dementia Father      C.A.D. Maternal Grandfather      open heart surgery     DIABETES Maternal Grandfather      CANCER Maternal Grandfather      Alcohol/Drug Maternal " Grandfather      Hypertension Maternal Grandmother      DIABETES Maternal Grandmother      Breast Cancer Maternal Grandmother      Circulatory Paternal Uncle      Blood clots     Neurologic Disorder Sister      Epilepsy     DIABETES Paternal Grandmother      DIABETES Paternal Grandfather      Alcohol/Drug Paternal Grandfather      Breast Cancer Maternal Aunt      Breast Cancer Maternal Aunt      Breast Cancer Maternal Aunt      CANCER Maternal Uncle      Throat     CANCER Maternal Uncle      Jaw     Alcohol/Drug Brother      CEREBROVASCULAR DISEASE Sister      Myocardial Infarction Brother 59     Cancer - colorectal Maternal Uncle        Review of Systems   The 10 point Review of Systems is negative other than noted in the HPI or here.   No abdominal pain  No nausea or vomiting associated with spells  No pain currently    Physical Exam   Temp: 98.2  F (36.8  C) Temp src: Oral BP: 109/64 Pulse: 89   Resp: 16 SpO2: 95 % O2 Device: None (Room air)    Vital Signs with Ranges  Temp:  [98.2  F (36.8  C)] 98.2  F (36.8  C)  Pulse:  [89] 89  Resp:  [16] 16  BP: (109-120)/(64-73) 109/64  SpO2:  [95 %-98 %] 95 %  147 lbs 0 oz    Constitutional: no acute distress, alert, conversant  Eyes: no scleral icterus or injection  HEENT: moist mucous membrane. No trauma appreciated on posterior scalp where patient describes having hit head while seizing.  Respiratory: breath sounds clear bilaterally to auscultation, no wheezes, no crackles  Cardiovascular: regular rate and rhythm, no murmur  GI: abdomen soft, non-tender, normoactive bowel sounds, no masses  Lymph/Hematologic: no lower extremity swelling  Genitourinary: not examined  Skin: no rashes. Small approximately 1.5 cm bruise on dorsal aspect of right hand. Patient states this was present prior to arrival in emergency department and not associated with attempt at blood draw.  Musculoskeletal: muscular tone intact in all extremities  Neurologic: mental status grossly intact, no  focal deficits, alert  Psychiatric: normal affect     Data   Data reviewed today:  I personally reviewed no images or EKG's today. CT head report without evidence of trauma or hemorrhage.    Recent Labs  Lab 05/30/17  2044   WBC 4.6   HGB 12.1   MCV 95         POTASSIUM 3.7   CHLORIDE 107   CO2 27   BUN 18   CR 0.57   ANIONGAP 7   CAMMIE 8.6   *       Recent Results (from the past 24 hour(s))   CT Head w/o Contrast    Narrative    CT SCAN OF THE HEAD WITHOUT CONTRAST   5/30/2017 10:00 PM     HISTORY: headache, seizure    TECHNIQUE:  Axial images of the head and coronal reformations without  IV contrast material. Radiation dose for this scan was reduced using  automated exposure control, adjustment of the mA and/or kV according  to patient size, or iterative reconstruction technique.    COMPARISON: MRI 1/25/2017    FINDINGS:  The ventricles are normal in size, shape and configuration.   The brain parenchyma and subarachnoid spaces are normal. There is no  evidence of intracranial hemorrhage, mass, acute infarct or anomaly.     The visualized portions of the sinuses and mastoids appear normal.  There is no evidence of trauma.      Impression    IMPRESSION: Normal CT scan of the head.  No change.      JEREMY PEACE MD
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
## 2017-05-31 NOTE — PHARMACY-ADMISSION MEDICATION HISTORY
Admission medication history interview status for the 5/30/2017  admission is complete. See EPIC admission navigator for prior to admission medications     Medication history source reliability:Good    Actions taken by pharmacist (provider contacted, etc):  Spoke w/ patient.      Additional medication history information not noted on PTA med list :None    Medication reconciliation/reorder completed by provider prior to medication history? No    Time spent in this activity: 15 minutes    Prior to Admission medications    Medication Sig Last Dose Taking? Auth Provider   CLONAZEPAM PO Take 0.5 mg by mouth daily as needed for anxiety   at prn Yes Reported, Patient   Brexpiprazole (REXULTI PO) Take 4 mg by mouth At Bedtime  5/29/2017 at pm Yes Unknown, Entered By History   divalproex (DEPAKOTE ER) 500 MG 24 hr tablet Take 500 mg by mouth At Bedtime 5/29/2017 at pm Yes Unknown, Entered By History   GABAPENTIN PO Take 1,200 mg by mouth At Bedtime  5/29/2017 at pm Yes Unknown, Entered By History   traZODone (DESYREL) 100 MG tablet Take 2 tablets (200 mg) by mouth At Bedtime 5/29/2017 at hs Yes Cande Gilliland APRN CNP   docusate sodium (COLACE) 100 MG capsule Take 1 capsule (100 mg) by mouth At Bedtime 5/29/2017 at pm Yes Cande Gilliland APRN CNP Carolyn Dahlman, Brook[place]

## 2017-05-31 NOTE — DISCHARGE SUMMARY
"[facility]    Discharge Summary  Hospitalist    Date of Admission:  5/30/2017  Date of Discharge:  5/31/2017  Discharging Provider: Zacarias Nichols MD  Date of Service (when I saw the patient): 05/31/17    Discharge Diagnoses   Acute spell likely due to pseudo-seizure    History of Present Illness   Andie Elkins is a pleasant 51 year old woman with chronic bipolar disorder who presented with an acute spell. Please see her H and P for complete details of her medical history and initial presentation.    Hospital Course   Andie Elkins was admitted on 5/30/2017.  The following problems were addressed during her hospitalization:    1) Spell: Ms. Ahsan Elkins has chronic spells that are triggered by stress or anxiety. She has seen neurology in the past. On this admission she had no further episodes after admission. CT Head without contrast was negative for acute pathology, and CBC and BMP were unremarkable. Urine drug screen and Ethanol levels were negative. EEG was done and neurology was consulted. It is found that her spells are due to pseudoseizures. She will be discharged without change to her medical regimen. She should see her Psychiatrist at the next available appointment and her PCP in a week.    2) Chronic bipolar disorder: She continues gabapentin, Depakote, and Trazodone.    Zacarias Nichols MD    Code Status   Full Code       Primary Care Physician   Christina Izaguirre    Blood pressure 102/64, pulse 73, temperature 98.6  F (37  C), temperature source Oral, resp. rate 16, height 1.626 m (5' 4\"), weight 66.7 kg (147 lb), last menstrual period 05/29/2008, SpO2 96 %, not currently breastfeeding.    Constitutional: Alert and oriented to person, place and time; no apparent distress  HEENT: normocephalic moist mucous membranes  Respiratory: lungs clear to auscultation bilaterally  Cardiovascular: regular S1 S2 no murmurs rubs or gallops  GI: abdomen soft non tender non " "distended bowel sounds positive  Lymph/Hematologic: no pallor, no cervical lymphadenopathy  Skin: no rash, good turgor  Musculoskeletal: no clubbing, cyanosis or edema  Neurologic: extra-ocular muscles intact; moves all four extremities  Psychiatric: appropriate affect, insight and judgment    Discharge Disposition   Discharged to home  Condition at discharge: Good    Consultations This Hospital Stay   NEUROLOGY IP CONSULT    Time Spent on this Encounter   I, Zacarias Nichols, personally saw the patient today and spent less than or equal to 30 minutes discharging this patient.    Discharge Orders     Reason for your hospital stay   For evaluation of a spell.     Follow-up and recommended labs and tests    Follow up with primary care provider, Christina Izaguirre, within 7 days for hospital follow- up.     Activity   Your activity upon discharge: activity as tolerated     Full Code     Diet   Follow this diet upon discharge: Orders Placed This Encounter     Regular Diet Adult       Discharge Medications   Current Discharge Medication List      CONTINUE these medications which have NOT CHANGED    Details   CLONAZEPAM PO Take 0.5 mg by mouth daily as needed for anxiety       Brexpiprazole (REXULTI PO) Take 4 mg by mouth At Bedtime       divalproex (DEPAKOTE ER) 500 MG 24 hr tablet Take 500 mg by mouth At Bedtime      GABAPENTIN PO Take 1,200 mg by mouth At Bedtime       traZODone (DESYREL) 100 MG tablet Take 2 tablets (200 mg) by mouth At Bedtime      docusate sodium (COLACE) 100 MG capsule Take 1 capsule (100 mg) by mouth At Bedtime           Allergies   Allergies   Allergen Reactions     Codeine Other (See Comments)     \"knocks me out\"     Penicillins Rash     Data   Most Recent 3 CBC's:  Recent Labs   Lab Test  05/30/17 2044 05/17/17   1213  01/26/17   0750   WBC  4.6  3.6*  2.5*   HGB  12.1  11.3*  11.8   MCV  95  94  94   PLT  244  219  178      Most Recent 3 BMP's:  Recent Labs   Lab Test  05/30/17 2044  " 05/17/17   1213  01/26/17   0750   NA  141  140  140   POTASSIUM  3.7  3.8  3.9   CHLORIDE  107  108  107   CO2  27  27  26   BUN  18  14  11   CR  0.57  0.68  0.60   ANIONGAP  7  5  7   CAMMIE  8.6  8.4*  8.4*   GLC  107*  104*  101*     Most Recent 2 LFT's:  Recent Labs   Lab Test  06/20/16   1602  06/01/16   1608   AST  19  21   ALT  24  24   ALKPHOS  55  61   BILITOTAL  0.4  0.3     Most Recent INR's and Anticoagulation Dosing History:  Anticoagulation Dose History     Recent Dosing and Labs Latest Ref Rng & Units 8/28/2007 6/28/2009 9/4/2013    INR 0.86 - 1.14 1.03 0.95 0.94        Most Recent 3 Troponin's:  Recent Labs   Lab Test  09/04/13   1802  09/04/13   1556  07/03/12   0600   06/28/09   0115   TROPI  <0.012  <0.012  <0.012   < >  <0.012   TROPONIN   --    --    --    --   0.00    < > = values in this interval not displayed.     Most Recent Cholesterol Panel:  Recent Labs   Lab Test  06/20/16   1602   CHOL  192   LDL  113*   HDL  48*   TRIG  157*     Most Recent 6 Bacteria Isolates From Any Culture (See EPIC Reports for Culture Details):  Recent Labs   Lab Test  06/01/16   1615  03/08/16   1444  01/08/14   0952   CULT  50,000 to 100,000 colonies/mL mixed urogenital alisia  50,000 to 100,000 colonies/mL Klebsiella pneumoniae*  <10,000 colonies/mL Corynebacterium species Susceptibility testing not routinely done*     Most Recent TSH, T4 and A1c Labs:  Recent Labs   Lab Test  12/12/16   1549   TSH  1.64   A1C  5.5     Results for orders placed or performed during the hospital encounter of 05/30/17   CT Head w/o Contrast    Narrative    CT SCAN OF THE HEAD WITHOUT CONTRAST   5/30/2017 10:00 PM     HISTORY: headache, seizure    TECHNIQUE:  Axial images of the head and coronal reformations without  IV contrast material. Radiation dose for this scan was reduced using  automated exposure control, adjustment of the mA and/or kV according  to patient size, or iterative reconstruction technique.    COMPARISON: MRI  1/25/2017    FINDINGS:  The ventricles are normal in size, shape and configuration.   The brain parenchyma and subarachnoid spaces are normal. There is no  evidence of intracranial hemorrhage, mass, acute infarct or anomaly.     The visualized portions of the sinuses and mastoids appear normal.  There is no evidence of trauma.      Impression    IMPRESSION: Normal CT scan of the head.  No change.      JEREMY PEACE MD

## 2017-05-31 NOTE — PLAN OF CARE
Problem: Goal Outcome Summary  Goal: Goal Outcome Summary  Outcome: Improving  Patient discharge instruction reviewed to her and she verbalized understanding. Patient discharging home. To be transported by the spouse.

## 2017-05-31 NOTE — PLAN OF CARE
Problem: Goal Outcome Summary  Goal: Goal Outcome Summary  Outcome: Improving  A&Ox4. Neuros intact. VSS. Tele NS. Reg diet. Up with SBA. Denies pain. Plan to d/c today pending EEG results.

## 2017-06-01 ENCOUNTER — TELEPHONE (OUTPATIENT)
Dept: FAMILY MEDICINE | Facility: CLINIC | Age: 52
End: 2017-06-01

## 2017-06-01 NOTE — PROCEDURES
ELECTROENCEPHALOGRAM (ROUTINE)      ORDERING PHYSICIAN:  Dr. Raghu Santillan       EEG #       This EEG is recorded on Ricardo Elkins, an awake and alert individual.  Background activity is represented by alpha rhythm which is present in both hemispheres symmetrically.  Photic stimulation is not performed.  Hyperventilation is performed and does not show any abnormalities.      There is no evidence of epileptiform patterns or epileptiform seizures.      IMPRESSION:  This is a normal EEG.  Photic stimulation has not been performed.         MACIEL BAKER MD             D: 2017 14:35   T: 2017 10:29   MT: CD      Name:     RICARDO OGLESBY   MRN:      [id number]        Account:        [id number]   :      1965           Procedure Date: 2017      Document: [id number]

## 2017-06-01 NOTE — TELEPHONE ENCOUNTER
ED / Discharge Outreach Protocol    Patient Contact    Attempt # 1    Was call answered?  No.  Left message on voicemail with information to call me back.  Deena Joe RN  Triage [employer]

## 2017-06-01 NOTE — TELEPHONE ENCOUNTER
Pt was discharged from [facility] on 5/31/2017 after being treated for seizures. Please call the pt. Thank you.  Ginger Ngo,

## 2017-06-05 NOTE — TELEPHONE ENCOUNTER
"ED/Discharge Protocol    \"Hi, my name is Catherine Romero, a registered nurse, and I am calling on behalf of Dr. Izaguirre's office at [facility].  I am calling to follow up and see how things are going for you after your recent visit.\"    \"I see that you were in the (ER/UC/IP) on 5/31/17.    How are you doing now that you are home?\" i'm doing okay.  They found out it was pseudo seizure not epileptic seizure    Is patient experiencing symptoms that may require a hospital visit?  None reported     Discharge Instructions    \"Let's review your discharge instructions.  What is/are the follow-up recommendations?  Pt. Response: to follow with my psychiatrist for medication to help with my anxiety and to control my seizure     \"Were you instructed to make a follow-up appointment?\"  Pt. Response: Yes.  Has appointment been made?   Yes      \"When you see the provider, I would recommend that you bring your discharge instructions with you.    Medications    \"How many new medications are you on since your hospitalization/ED visit?\"    0-1  \"How many of your current medicines changed (dose, timing, name, etc.) while you were in the hospital/ED visit?\"   0-1  \"Do you have questions about your medications?\"   No  \"Were you newly diagnosed with heart failure, COPD, diabetes or did you have a heart attack?\"   No  For patients on insulin: \"Did you start on insulin in the hospital or did you have your insulin dose changed?\"   No    Medication reconciliation completed? No,    Was MTM referral placed (*Make sure to put transitions as reason for referral)?   No    Call Summary    \"Do you have any questions or concerns about your condition or care plan at the moment?\"    No    Patient was in ER 5 in the past year (assess appropriateness of ER visits.)      \"If you have questions or things don't continue to improve, we encourage you contact us through the main clinic number,  [phone].  Even if the clinic is not open, triage nurses are available " "24/7 to help you.     We would like you to know that our clinic has extended hours (provide information).  We also have urgent care (provide details on closest location and hours/contact info)\"      \"Thank you for your time and take care!\"    Next 5 appointments (look out 90 days)     Jun 06, 2017 10:00 AM CDT   Office Visit with Christina Izaguirre MD   [facility] ([facility])    [address] [place] [id number] [phone]                Catherine Romero RN          "

## 2017-06-06 ENCOUNTER — TELEPHONE (OUTPATIENT)
Dept: FAMILY MEDICINE | Facility: CLINIC | Age: 52
End: 2017-06-06

## 2017-06-06 ENCOUNTER — OFFICE VISIT (OUTPATIENT)
Dept: FAMILY MEDICINE | Facility: CLINIC | Age: 52
End: 2017-06-06
Payer: COMMERCIAL

## 2017-06-06 VITALS
TEMPERATURE: 97.9 F | WEIGHT: 156 LBS | BODY MASS INDEX: 26.78 KG/M2 | DIASTOLIC BLOOD PRESSURE: 77 MMHG | SYSTOLIC BLOOD PRESSURE: 123 MMHG | OXYGEN SATURATION: 96 %

## 2017-06-06 DIAGNOSIS — F44.5 PSEUDOSEIZURE: Primary | ICD-10-CM

## 2017-06-06 DIAGNOSIS — F31.81 BIPOLAR II DISORDER, MOST RECENT EPISODE MAJOR DEPRESSIVE (H): ICD-10-CM

## 2017-06-06 DIAGNOSIS — F33.0 MAJOR DEPRESSIVE DISORDER, RECURRENT EPISODE, MILD (H): ICD-10-CM

## 2017-06-06 PROCEDURE — 99213 OFFICE O/P EST LOW 20 MIN: CPT | Performed by: INTERNAL MEDICINE

## 2017-06-06 NOTE — TELEPHONE ENCOUNTER
Patient has a hospital follow up with me today. Can you call to see if she is planning to come to that appointment today? If she cannot come then please reschedule. Thanks!

## 2017-06-06 NOTE — TELEPHONE ENCOUNTER
[facility] to inform [facility]CP that patient started 3 day DBT group yesterday.     Any questions, Nunu can be reached at [phone].     Kelly Webster RN   [facility] - Triage

## 2017-06-06 NOTE — MR AVS SNAPSHOT
After Visit Summary   6/6/2017    Andie Elkins    MRN: [id number]           Patient Information     Date Of Birth          1965        Visit Information        Provider Department      6/6/2017 10:00 AM Christina Izaguirre MD [facility] [place]        Today's Diagnoses     Pseudoseizure    -  1    Bipolar II disorder, most recent episode major depressive (H)        Major depressive disorder, recurrent episode, mild (H)           Follow-ups after your visit        Who to contact     If you have questions or need follow up information about today's clinic visit or your schedule please contact [facility] [place] directly at [phone].  Normal or non-critical lab and imaging results will be communicated to you by MyChart, letter or phone within 4 business days after the clinic has received the results. If you do not hear from us within 7 days, please contact the clinic through [email or web address] or phone. If you have a critical or abnormal lab result, we will notify you by phone as soon as possible.  Submit refill requests through [employer] or call your pharmacy and they will forward the refill request to us. Please allow 3 business days for your refill to be completed.          Additional Information About Your Visit        MyChart Information     [employer] gives you secure access to your electronic health record. If you see a primary care provider, you can also send messages to your care team and make appointments. If you have questions, please call your primary care clinic.  If you do not have a primary care provider, please call [phone] and they will assist you.        Care EveryWhere ID     This is your Care EveryWhere ID. This could be used by other organizations to access your [facility] medical records  [id number]        Your Vitals Were     Temperature Last Period Pulse Oximetry BMI (Body Mass Index)          97.9  F (36.6  C) (Oral) 05/29/2008 96% 26.78 kg/m2          Blood Pressure from Last 3 Encounters:   06/06/17 123/77   05/31/17 102/64   05/23/17 120/79    Weight from Last 3 Encounters:   06/06/17 156 lb (70.8 kg)   05/30/17 147 lb (66.7 kg)   05/23/17 156 lb (70.8 kg)              Today, you had the following     No orders found for display         Today's Medication Changes          These changes are accurate as of: 6/6/17 10:56 AM.  If you have any questions, ask your nurse or doctor.               Stop taking these medicines if you haven't already. Please contact your care team if you have questions.     CLONAZEPAM PO   Stopped by:  Christina Izaguirre MD           VENLAFAXINE HCL PO   Stopped by:  Christina Izaguirre MD                    Primary Care Provider Office Phone # Fax #    Christina Izaguirre [phone]       [facility] [address] [place] [address] [address]  [place] [address]        Thank you!     Thank you for choosing [facility]  for your care. Our goal is always to provide you with excellent care. Hearing back from our patients is one way we can continue to improve our services. Please take a few minutes to complete the written survey that you may receive in the mail after your visit with us. Thank you!             Your Updated Medication List - Protect others around you: Learn how to safely use, store and throw away your medicines at www.disposemymeds.org.          This list is accurate as of: 6/6/17 10:56 AM.  Always use your most recent med list.                   Brand Name Dispense Instructions for use    docusate sodium 100 MG capsule    COLACE     Take 1 capsule (100 mg) by mouth At Bedtime       GABAPENTIN PO      Take 1,200 mg by mouth At Bedtime       traZODone 100 MG tablet    DESYREL     Take 2 tablets (200 mg) by mouth At Bedtime

## 2017-06-06 NOTE — PROGRESS NOTES
SUBJECTIVE:                                                    Andie Elkins is a 51 year old female who presents to clinic today for the following health issues:      Hospital Follow-up Visit:    Hospital/Nursing Home/IP Rehab Facility: [facility]  Date of Admission: 5/30/207  Date of Discharge: 5/31/2017  Reason(s) for Admission: pseudo seizure             Problems taking medications regularly:  None       Medication changes since discharge: None       Problems adhering to non-medication therapy:  None    Summary of hospitalization:  [facility] discharge summary reviewed  Diagnostic Tests/Treatments reviewed.  Follow up needed: none  Other Healthcare Providers Involved in Patient s Care:         Psychiatry  Update since discharge: improved.     Post Discharge Medication Reconciliation: discharge medications reconciled, continue medications without change.  Plan of care communicated with patient     Coding guidelines for this visit:  Type of Medical   Decision Making Face-to-Face Visit       within 7 Days of discharge Face-to-Face Visit        within 14 days of discharge   Moderate Complexity [id number]   High Complexity [id number]          On 5/30 Andie was at work and feeling overwhelmed when she then developed symptoms she has learned to relate to her seizures. The next thing she woke up and didn't remember where she was. Her boss said that she had what looked like a seizure and she was taken to the hospital. Upon arrival there she was stable and neurology was consulted. Andie has a history of pseudoseizures. She had an EEG and MRI that were both negative so it is presumed that this was a pseudoseizure. No changes in medications have been made but she saw her psychiatrist who started her on a new medication ( can't remember the name).     She has since quit her job at [employer] and has applied for a job at [employer]. Her psychiatrist is writing a letter that her day needs to  be divided into 4 hr increments.     Andie also started DBT yesterday and has changed her regular soda intake to diet soda.     Neurology has cleared her to drive.       Problem list and histories reviewed & adjusted, as indicated.  Additional history: as documented    Patient Active Problem List   Diagnosis     CARDIOVASCULAR SCREENING; LDL GOAL LESS THAN 160     Lumbago     Hyperlipidemia LDL goal <160     Left shoulder pain     Bipolar II disorder, most recent episode major depressive (H)     Major depressive disorder, recurrent episode, mild (H)     Gastroesophageal reflux disease, esophagitis presence not specified     Nausea     Personal history of malignant neoplasm of breast     Suicidal ideation     TIA (transient ischemic attack)     Seizure (H)     Past Surgical History:   Procedure Laterality Date     C ANESTH,LOWER ARM SURGERY      reconstruction. fracture     GYN SURGERY  endometriosis     LUMPECTOMY BREAST Left 2005     ORTHOPEDIC SURGERY         Social History   Substance Use Topics     Smoking status: Former Smoker     Packs/day: 3.00     Years: 4.00     Quit date: 1/1/1985     Smokeless tobacco: Never Used     Alcohol use 0.0 oz/week     0 Standard drinks or equivalent per week      Comment: occasional     Family History   Problem Relation Age of Onset     Lipids Mother      Hypertension Mother      CEREBROVASCULAR DISEASE Mother      Depression Mother      DIABETES Mother 73     HEART DISEASE Mother 73     heart mumur     C.A.D. Father      CABGx4     DIABETES Father      Alcohol/Drug Father      CANCER Father      lung cancer     Dementia Father      C.A.D. Maternal Grandfather      open heart surgery     DIABETES Maternal Grandfather      CANCER Maternal Grandfather      Alcohol/Drug Maternal Grandfather      Hypertension Maternal Grandmother      DIABETES Maternal Grandmother      Breast Cancer Maternal Grandmother      Circulatory Paternal Uncle      Blood clots     Neurologic Disorder Sister       Epilepsy     DIABETES Paternal Grandmother      DIABETES Paternal Grandfather      Alcohol/Drug Paternal Grandfather      Breast Cancer Maternal Aunt      Breast Cancer Maternal Aunt      Breast Cancer Maternal Aunt      CANCER Maternal Uncle      Throat     CANCER Maternal Uncle      Jaw     Alcohol/Drug Brother      CEREBROVASCULAR DISEASE Sister      Myocardial Infarction Brother 59     Cancer - colorectal Maternal Uncle            Reviewed and updated as needed this visit by clinical staff       Reviewed and updated as needed this visit by Provider         ROS:  Constitutional, HEENT, cardiovascular, pulmonary, gi and gu systems are negative, except as otherwise noted.    OBJECTIVE:                                                    /77 (BP Location: Right arm, Cuff Size: Adult Regular)  Temp 97.9  F (36.6  C) (Oral)  Wt 156 lb (70.8 kg)  LMP 05/29/2008  SpO2 96%  BMI 26.78 kg/m2  Body mass index is 26.78 kg/(m^2).  GENERAL: healthy, alert and no distress  NECK: no adenopathy, no asymmetry, masses, or scars and thyroid normal to palpation  RESP: lungs clear to auscultation - no rales, rhonchi or wheezes  CV: regular rate and rhythm, normal S1 S2, no S3 or S4, no murmur, click or rub, no peripheral edema and peripheral pulses strong  MS: no gross musculoskeletal defects noted, no edema  NEURO: Normal strength and tone, sensory exam grossly normal, mentation intact and cranial nerves 2-12 intact    Diagnostic Test Results:  Reviewed in EPIC     ASSESSMENT/PLAN:                                                      1. Pseudoseizure  Starting DBT this week and following closely with her psychiatrist. No further episodes since hospitalization. Andie is also making other life changes - switching to a less stressful job and changing her diet.     2. Bipolar II disorder, most recent episode major depressive (H)    3. Major depressive disorder, recurrent episode, mild (H)      Follow up as  needed.      Christina Izaguirre MD  [facility]

## 2017-09-05 ENCOUNTER — APPOINTMENT (OUTPATIENT)
Dept: NUCLEAR MEDICINE | Facility: CLINIC | Age: 52
End: 2017-09-05
Attending: EMERGENCY MEDICINE
Payer: COMMERCIAL

## 2017-09-05 ENCOUNTER — HOSPITAL ENCOUNTER (EMERGENCY)
Facility: CLINIC | Age: 52
Discharge: HOME | End: 2017-09-05
Attending: EMERGENCY MEDICINE | Admitting: EMERGENCY MEDICINE
Payer: COMMERCIAL

## 2017-09-05 ENCOUNTER — APPOINTMENT (OUTPATIENT)
Dept: GENERAL RADIOLOGY | Facility: CLINIC | Age: 52
End: 2017-09-05
Attending: EMERGENCY MEDICINE
Payer: COMMERCIAL

## 2017-09-05 ENCOUNTER — APPOINTMENT (OUTPATIENT)
Dept: ULTRASOUND IMAGING | Facility: CLINIC | Age: 52
End: 2017-09-05
Attending: EMERGENCY MEDICINE
Payer: COMMERCIAL

## 2017-09-05 VITALS
HEIGHT: 64 IN | BODY MASS INDEX: 27.59 KG/M2 | TEMPERATURE: 97.9 F | OXYGEN SATURATION: 97 % | WEIGHT: 161.6 LBS | SYSTOLIC BLOOD PRESSURE: 122 MMHG | DIASTOLIC BLOOD PRESSURE: 73 MMHG | RESPIRATION RATE: 10 BRPM

## 2017-09-05 DIAGNOSIS — R07.89 ATYPICAL CHEST PAIN: ICD-10-CM

## 2017-09-05 LAB
ALBUMIN SERPL-MCNC: 3.5 G/DL (ref 3.4–5)
ALP SERPL-CCNC: 94 U/L (ref 40–150)
ALT SERPL W P-5'-P-CCNC: 28 U/L (ref 0–50)
ANION GAP SERPL CALCULATED.3IONS-SCNC: 7 MMOL/L (ref 3–14)
AST SERPL W P-5'-P-CCNC: 21 U/L (ref 0–45)
BASOPHILS # BLD AUTO: 0 10E9/L (ref 0–0.2)
BASOPHILS NFR BLD AUTO: 0.3 %
BILIRUB SERPL-MCNC: 0.2 MG/DL (ref 0.2–1.3)
BUN SERPL-MCNC: 11 MG/DL (ref 7–30)
CALCIUM SERPL-MCNC: 9 MG/DL (ref 8.5–10.1)
CHLORIDE SERPL-SCNC: 106 MMOL/L (ref 94–109)
CO2 SERPL-SCNC: 27 MMOL/L (ref 20–32)
CREAT SERPL-MCNC: 0.72 MG/DL (ref 0.52–1.04)
D DIMER PPP FEU-MCNC: 1.8 UG/ML FEU (ref 0–0.5)
DIFFERENTIAL METHOD BLD: ABNORMAL
EOSINOPHIL # BLD AUTO: 0.2 10E9/L (ref 0–0.7)
EOSINOPHIL NFR BLD AUTO: 5.1 %
ERYTHROCYTE [DISTWIDTH] IN BLOOD BY AUTOMATED COUNT: 12.3 % (ref 10–15)
GFR SERPL CREATININE-BSD FRML MDRD: 85 ML/MIN/1.7M2
GLUCOSE SERPL-MCNC: 91 MG/DL (ref 70–99)
HCT VFR BLD AUTO: 38.2 % (ref 35–47)
HGB BLD-MCNC: 13.2 G/DL (ref 11.7–15.7)
IMM GRANULOCYTES # BLD: 0 10E9/L (ref 0–0.4)
IMM GRANULOCYTES NFR BLD: 0 %
INTERPRETATION ECG - MUSE: NORMAL
LYMPHOCYTES # BLD AUTO: 1.1 10E9/L (ref 0.8–5.3)
LYMPHOCYTES NFR BLD AUTO: 34.8 %
MCH RBC QN AUTO: 32.5 PG (ref 26.5–33)
MCHC RBC AUTO-ENTMCNC: 34.6 G/DL (ref 31.5–36.5)
MCV RBC AUTO: 94 FL (ref 78–100)
MONOCYTES # BLD AUTO: 0.3 10E9/L (ref 0–1.3)
MONOCYTES NFR BLD AUTO: 8.3 %
NEUTROPHILS # BLD AUTO: 1.6 10E9/L (ref 1.6–8.3)
NEUTROPHILS NFR BLD AUTO: 51.5 %
NRBC # BLD AUTO: 0 10*3/UL
NRBC BLD AUTO-RTO: 0 /100
PLATELET # BLD AUTO: 265 10E9/L (ref 150–450)
POTASSIUM SERPL-SCNC: 3.9 MMOL/L (ref 3.4–5.3)
PROT SERPL-MCNC: 7.7 G/DL (ref 6.8–8.8)
RBC # BLD AUTO: 4.06 10E12/L (ref 3.8–5.2)
SODIUM SERPL-SCNC: 140 MMOL/L (ref 133–144)
TROPONIN I SERPL-MCNC: <0.015 UG/L (ref 0–0.04)
WBC # BLD AUTO: 3.1 10E9/L (ref 4–11)

## 2017-09-05 PROCEDURE — 71020 XR CHEST 2 VW: CPT

## 2017-09-05 PROCEDURE — 99285 EMERGENCY DEPT VISIT HI MDM: CPT | Mod: 25

## 2017-09-05 PROCEDURE — 80053 COMPREHEN METABOLIC PANEL: CPT | Performed by: EMERGENCY MEDICINE

## 2017-09-05 PROCEDURE — 34300033 ZZH RX 343: Performed by: EMERGENCY MEDICINE

## 2017-09-05 PROCEDURE — 85025 COMPLETE CBC W/AUTO DIFF WBC: CPT | Performed by: EMERGENCY MEDICINE

## 2017-09-05 PROCEDURE — 27210210 NM LUNG SCAN VENTILATION AND PERFUSION

## 2017-09-05 PROCEDURE — 93005 ELECTROCARDIOGRAM TRACING: CPT

## 2017-09-05 PROCEDURE — 85379 FIBRIN DEGRADATION QUANT: CPT | Performed by: EMERGENCY MEDICINE

## 2017-09-05 PROCEDURE — A9567 TECHNETIUM TC-99M AEROSOL: HCPCS | Performed by: EMERGENCY MEDICINE

## 2017-09-05 PROCEDURE — 93970 EXTREMITY STUDY: CPT

## 2017-09-05 PROCEDURE — A9540 TC99M MAA: HCPCS | Performed by: EMERGENCY MEDICINE

## 2017-09-05 PROCEDURE — 84484 ASSAY OF TROPONIN QUANT: CPT | Performed by: EMERGENCY MEDICINE

## 2017-09-05 RX ORDER — IOPAMIDOL 755 MG/ML
63 INJECTION, SOLUTION INTRAVASCULAR ONCE
Status: DISCONTINUED | OUTPATIENT
Start: 2017-09-05 | End: 2017-09-05 | Stop reason: HOSPADM

## 2017-09-05 RX ADMIN — Medication 3.4 MILLICURIE: at 11:15

## 2017-09-05 RX ADMIN — KIT FOR THE PREPARATION OF TECHNETIUM TC 99M PENTETATE 65 MILLICURIE: 20 INJECTION, POWDER, LYOPHILIZED, FOR SOLUTION INTRAVENOUS; RESPIRATORY (INHALATION) at 11:25

## 2017-09-05 ASSESSMENT — ENCOUNTER SYMPTOMS
LIGHT-HEADEDNESS: 0
COUGH: 1
APPETITE CHANGE: 0
DIZZINESS: 0

## 2017-09-05 NOTE — ED AVS SNAPSHOT
Emergency Department    [address] [place] [id number]    Phone:  [phone]    Fax:  [phone]                                       Andie Elkins   MRN: [id number]    Department:   Emergency Department   Date of Visit:  9/5/2017           After Visit Summary Signature Page     I have received my discharge instructions, and my questions have been answered. I have discussed any challenges I see with this plan with the nurse or doctor.    ..........................................................................................................................................  Patient/Patient Representative Signature      ..........................................................................................................................................  Patient Representative Print Name and Relationship to Patient    ..................................................               ................................................  Date                                            Time    ..........................................................................................................................................  Reviewed by Signature/Title    ...................................................              ..............................................  Date                                                            Time

## 2017-09-05 NOTE — DISCHARGE INSTRUCTIONS
*CHEST PAIN, UNCERTAIN CAUSE    Based on your exam today, the exact cause of your chest pain is not certain. Your condition does not seem serious at this time, and your pain does not appear to be coming from your heart. However, sometimes the signs of a serious problem take more time to appear. Therefore, watch for the warning signs listed below.  HOME CARE:  1. Rest today and avoid strenuous activity.  2. Take any prescribed medicine as directed.  FOLLOW UP with your doctor in 1-3 days.   GET PROMPT MEDICAL ATTENTION if any of the following occur:    A change in the type of pain: if it feels different, becomes more severe, lasts longer, or begins to spread into your shoulder, arm, neck, jaw or back    Shortness of breath or increased pain with breathing    Weakness, dizziness, or fainting    Cough with blood or dark colored sputum (phlegm)    Fever over 101  F (38.3  C)    Swelling, pain or redness in one leg    [id number] [address], [place] [address]. All rights reserved. This information is not intended as a substitute for professional medical care. Always follow your healthcare professional's instructions.

## 2017-09-05 NOTE — ED PROVIDER NOTES
"  History     Chief Complaint:  Chest Pain     HPI   Andie Elkins is a 52 year old female who presents with chest pain. For the last three weeks the patient reports she has had chest pain that comes and goes everyday. She states that when she is having the chest pain she has a hard time catching her breath, and states that it takes deep breathing to catch her breath. This morning the pain became worse which is why she presents here today. Currently the patient endorses chest pain that she rates at an 8/10 severity, which is associated with a intermittent cough. The patient states the for four days her chest pain radiated to her back and made a \"C\" shape. She states that lying down makes the pain better, but sitting or walking makes the pain worse. Advil and Aleve were taken to combat the pain, but they did not work very well. She denies any lightheadedness, dizziness,  problems eating, or possibility of her straining her chest. There are no other complaints at this time.     Of note, the patient took a car trip to [place] a month ago and states that currently her and her spouse are . Also, she has had a heart attack in the past and had breast cancer in her left breast around 12 years ago.    Allergies:  Codeine  Penicillins     Medications:    Depakote PO  Clonazepam PO  Gabapentin PO  Desyrel  Colace    Past Medical History:    Breast cancer   Depressive disorder  Myocardial infarction  Lumbago   Hyperlipidemia  Left shoulder pain  Bipolar 2 disorder  Nausea  Gastroesophageal reflux disease  Suicidal ideation  TIA  Seizure    Past Surgical History:    C anesth, lower arm surgery- reconstruction  GUN surgery  Lumpectomy breast   Orthopedic surgery    Family History:    Lipids   Hypertension  Cerebrovascular disease  Depression  Diabetes   Heart disease  C.A.D.   Cancer- lung  Dementia   Epilepsy  Myocardial infarction    Social History:  Presents by herself   Tobacco use: Former smoker, 3.00 ppd " "for four years, quit on 1/1/1985  Alcohol use: Occasionally  PCP: Christina Izaguirre    Marital Status:        Review of Systems   Constitutional: Negative for appetite change.   Respiratory: Positive for cough.    Cardiovascular: Positive for chest pain.   Neurological: Negative for dizziness and light-headedness.   All other systems reviewed and are negative.    Physical Exam        BP Temp Temp src Resp SpO2 Height Weight   09/05/17 0650 123/66 97.9  F (36.6  C) Oral 18 96 % 1.626 m (5' 4\") 73.3 kg (161 lb 9.6 oz)   pulse  65    Physical Exam    Constitutional: White female, positioned supine.   HENT: No signs of trauma.   Eyes: EOM are normal. Pupils are equal, round, and reactive to light.   Neck: Normal range of motion. No JVD present. No cervical adenopathy.  Cardiovascular: Regular rhythm.  Exam reveals no gallop and no friction rub. 2+ radial pulses bilaterally.  No murmur heard.  Pulmonary/Chest: Chest wall reproducible tenderness to palpation. No redness or swelling. Bilateral breath sounds normal. No wheezes, rhonchi or rales. No breast mass or axillary adenopathy.  Abdominal: 2+ femoral pulses. Soft. No tenderness. No rebound or guarding.   Musculoskeletal: No edema. No tenderness.   Lymphadenopathy: No lymphadenopathy.   Neurological: Alert and oriented to person, place, and time. Normal strength. Coordination normal.   Skin: Skin is warm and dry. No rash noted. No erythema.     Emergency Department Course   ECG (6:53:24):  Rate 67 bpm. AZ interval 146. QRS duration 70. QT/QTc 404/426. P-R-T axes 12 15 42. Normal sinus rhythm. Nonspecific T wave abnormality.  Interpreted by Yang Escamilla MD.     Imaging:  Radiographic findings were communicated with the patient who voiced understanding of the findings.    XR Chest, 2 views:     IMPRESSION: Negative chest. Lungs clear.    Preliminary result per radiology.    US Lower Extremity Venous Duplex Bilateral:     IMPRESSION:  No evidence for deep " venous thrombosis in the bilateral lower extremity veins.    Preliminary result per radiology.    Lung Vent and Perf (NM):     IMPRESSION: Low probability for pulmonary embolism.    Preliminary result per radiology.    Laboratory:    CBC: WBC 3.1, HGB 13.2,     Troponin: <0.015     CMP: Creatinine 0.72    D dimer quantitative: 1.8     Emergency Department Course:    Past medical records, nursing notes, and vitals reviewed.  0727: I performed an exam of the patient and obtained history, as documented above.  IV inserted and blood drawn.   The patient was sent for a XR Chest while in the emergency department, findings above.   I personally reviewed the laboratory results with the Patient and answered all related questions prior to discharge.    1418: I rechecked the patient. Findings and plan explained to the Patient. Patient discharged home with instructions regarding supportive care, medications, and reasons to return. The importance of close follow-up was reviewed.       Impression & Plan      Medical Decision Making:    Andie Elkins is a 52 year old female who presents with chest pain. The pain has been present now for three weeks. It is somewhat intermittent and pleuritic in nature. Today it seemed worse and she came to the ED for evaluation. On exam she was resting comfortably without any respiratory distress and she is not tachycardic. Exam is unremarkable and her EKG is unremarkable as well. The patient did have Troponin and D dimer. The D dimer was positive. Because of this we ordered a CT chest, however there was  difficulty obtaining an IV. Even with ultrasound a good peripheral IV was not obtainable. The patient the went for a VQ scan that showed low probability and had an ultrasound of both legs which was also negative for risk factors. For risk factors she has a remote history of breast cancer and a car ride to [place] three to four weeks ago. The patient's pain at this time is pleuritic  and is somewhat atypical. It appears unlikely to be S1Q3T3 or dissection. I think coronary artery disease is also unlikely,  however we will obtain an outpatient stress echo in the next two days for further evaluation. The patient  is under a great amount of stress. She is in the middle of a breakup with her partner, and this may be part of the cause. I have also recommended Ibuprofen. If symptoms change or worsen the patient should return to the ED.    Diagnosis:    ICD-10-CM   1. Atypical chest pain R07.89       Disposition:  The patient was discharged to home with a plan as noted.  Xander Romano  9/5/2017    EMERGENCY DEPARTMENT    IXander am serving as a scribe at 7:27 AM on 9/5/2017 to document services personally performed by Yang Escamilla MD based on my observations and the provider's statements to me.       Yang Escamilla MD  09/05/17 3163

## 2017-09-05 NOTE — ED NOTES
[facility]  [facility] per pt.  Patient has given verbal permission to writer at bedside that Betina is to be update on pt condition if she calls to ask.

## 2017-09-05 NOTE — ED AVS SNAPSHOT
Emergency Department    [address] [facility] [address]    Phone:  [phone]    Fax:  [phone]                                       Andie Elkins   MRN: [id number]    Department:   Emergency Department   Date of Visit:  9/5/2017           Patient Information     Date Of Birth          1965        Your diagnoses for this visit were:     Atypical chest pain        You were seen by Yang Escamilla MD.      Follow-up Information     Schedule an appointment as soon as possible for a visit with Christina Izaguirre MD.    Specialty:  Pediatrics    Why:  recheck ed, If symptoms worsen    Contact information:    [address] [place] [address] [address]  [place] [address]  [phone]          Discharge Instructions          *CHEST PAIN, UNCERTAIN CAUSE    Based on your exam today, the exact cause of your chest pain is not certain. Your condition does not seem serious at this time, and your pain does not appear to be coming from your heart. However, sometimes the signs of a serious problem take more time to appear. Therefore, watch for the warning signs listed below.  HOME CARE:  1. Rest today and avoid strenuous activity.  2. Take any prescribed medicine as directed.  FOLLOW UP with your doctor in 1-3 days.   GET PROMPT MEDICAL ATTENTION if any of the following occur:    A change in the type of pain: if it feels different, becomes more severe, lasts longer, or begins to spread into your shoulder, arm, neck, jaw or back    Shortness of breath or increased pain with breathing    Weakness, dizziness, or fainting    Cough with blood or dark colored sputum (phlegm)    Fever over 101  F (38.3  C)    Swelling, pain or redness in one leg    [id number] [place] [address]. All rights reserved. This information is not intended as a substitute for professional medical care. Always follow your healthcare professional's instructions.      Future Appointments         Provider Department Dept Phone Center    9/13/2017 3:40 PM Christina Izaguirre MD [facility] [place] [phone]       24 Hour Appointment Hotline       To make an appointment at any [facility], call [phone] ([phone]). If you don't have a family doctor or clinic, we will help you find one. [facility] are conveniently located to serve the needs of you and your family.          ED Discharge Orders     Exercise Stress Echocardiogram       Administration of IV contrast will be tailored to this examination per the appropriate written protocol listed in the Echocardiography department Protocol Book, or by the supervising Cardiologist. This may result in an order change.    Use of contrast is at the discretion of the supervising Cardiologist.                     Review of your medicines      Our records show that you are taking the medicines listed below. If these are incorrect, please call your family doctor or clinic.        Dose / Directions Last dose taken    CLONAZEPAM PO        Refills:  0        DEPAKOTE PO   Dose:  500 mg        Take 500 mg by mouth daily   Refills:  0        docusate sodium 100 MG capsule   Commonly known as:  COLACE   Dose:  100 mg        Take 1 capsule (100 mg) by mouth At Bedtime   Refills:  0        GABAPENTIN PO   Dose:  1200 mg        Take 1,200 mg by mouth At Bedtime   Refills:  0        traZODone 100 MG tablet   Commonly known as:  DESYREL   Dose:  200 mg        Take 2 tablets (200 mg) by mouth At Bedtime   Refills:  0                Procedures and tests performed during your visit     Procedure/Test Number of Times Performed    CBC with platelets differential 1    Cardiac Continuous Monitoring 1    Comprehensive metabolic panel 1    D dimer quantitative 1    EKG 12-lead, tracing only 2    Lung vent and perf (NM) 1    Peripheral IV: Standard 1    Pulse oximetry nursing 1    Troponin I 1    US Lower Extremity Venous Duplex Bilateral 1    XR Chest 2 Views  1      Orders Needing Specimen Collection     None      Pending Results     No orders found from 9/3/2017 to 9/6/2017.            Pending Culture Results     No orders found from 9/3/2017 to 9/6/2017.            Pending Results Instructions     If you had any lab results that were not finalized at the time of your Discharge, you can call the ED Lab Result RN at [phone]. You will be contacted by this team for any positive Lab results or changes in treatment. The nurses are available 7 days a week from 10A to 6:30P.  You can leave a message 24 hours per day and they will return your call.        Test Results From Your Hospital Stay        9/5/2017  7:51 AM      Component Results     Component Value Ref Range & Units Status    WBC 3.1 (L) 4.0 - 11.0 10e9/L Final    RBC Count 4.06 3.8 - 5.2 10e12/L Final    Hemoglobin 13.2 11.7 - 15.7 g/dL Final    Hematocrit 38.2 35.0 - 47.0 % Final    MCV 94 78 - 100 fl Final    MCH 32.5 26.5 - 33.0 pg Final    MCHC 34.6 31.5 - 36.5 g/dL Final    RDW 12.3 10.0 - 15.0 % Final    Platelet Count 265 150 - 450 10e9/L Final    Diff Method Automated Method  Final    % Neutrophils 51.5 % Final    % Lymphocytes 34.8 % Final    % Monocytes 8.3 % Final    % Eosinophils 5.1 % Final    % Basophils 0.3 % Final    % Immature Granulocytes 0.0 % Final    Nucleated RBCs 0 0 /100 Final    Absolute Neutrophil 1.6 1.6 - 8.3 10e9/L Final    Absolute Lymphocytes 1.1 0.8 - 5.3 10e9/L Final    Absolute Monocytes 0.3 0.0 - 1.3 10e9/L Final    Absolute Eosinophils 0.2 0.0 - 0.7 10e9/L Final    Absolute Basophils 0.0 0.0 - 0.2 10e9/L Final    Abs Immature Granulocytes 0.0 0 - 0.4 10e9/L Final    Absolute Nucleated RBC 0.0  Final         9/5/2017  8:13 AM      Component Results     Component Value Ref Range & Units Status    Troponin I ES <0.015 0.000 - 0.045 ug/L Final    The 99th percentile for upper reference range is 0.045 ug/L.  Troponin values   in the range of 0.045 - 0.120 ug/L may be associated  with risks of adverse   clinical events.           9/5/2017  8:12 AM      Component Results     Component Value Ref Range & Units Status    Sodium 140 133 - 144 mmol/L Final    Potassium 3.9 3.4 - 5.3 mmol/L Final    Chloride 106 94 - 109 mmol/L Final    Carbon Dioxide 27 20 - 32 mmol/L Final    Anion Gap 7 3 - 14 mmol/L Final    Glucose 91 70 - 99 mg/dL Final    Urea Nitrogen 11 7 - 30 mg/dL Final    Creatinine 0.72 0.52 - 1.04 mg/dL Final    GFR Estimate 85 >60 mL/min/1.7m2 Final    Non  GFR Calc    GFR Estimate If Black >90 >60 mL/min/1.7m2 Final    African American GFR Calc    Calcium 9.0 8.5 - 10.1 mg/dL Final    Bilirubin Total 0.2 0.2 - 1.3 mg/dL Final    Albumin 3.5 3.4 - 5.0 g/dL Final    Protein Total 7.7 6.8 - 8.8 g/dL Final    Alkaline Phosphatase 94 40 - 150 U/L Final    ALT 28 0 - 50 U/L Final    AST 21 0 - 45 U/L Final         9/5/2017  8:05 AM      Component Results     Component Value Ref Range & Units Status    D Dimer 1.8 (H) 0.0 - 0.50 ug/ml FEU Final    This D-dimer assay is intended for use in conjunction with a clinical pretest   probability assessment model to exclude pulmonary embolism (PE) and deep   venous thrombosis (DVT) in outpatients suspected of PE or DVT. The cut-off   value is 0.5 ug/mL FEU.           9/5/2017  7:57 AM      Narrative     CHEST TWO VIEWS  9/5/2017 7:52 AM     HISTORY: Chest pain.    COMPARISON: 1/25/2017.        Impression     IMPRESSION: Negative chest. Lungs clear.    SEFERINO MCGWOAN MD               9/5/2017 12:51 PM      Narrative     NUCLEAR MEDICINE LUNG VENTILATION AND PERFUSION  9/5/2017 11:56 AM     HISTORY: Elevated D-dimer with pleuritic pain, unable to obtain  useable peripheral IV.    COMPARISON: None.    TECHNIQUE: Tc-99m MAA injected intravenously for evaluation of  pulmonary perfusion.  Tc-99m DTPA inhaled for the ventilation phase.    DOSE: 3.4 mCi Tc99m MAA via IV at 1115; 65 mCi Tc99m DTPA inhaled at  1130    FINDINGS: No unmatched  perfusion defects to suggest pulmonary emboli.         Impression     IMPRESSION: Low probability for pulmonary embolism.    WALKER LAMBERT MD         9/5/2017  1:58 PM      Narrative     US LOWER EXTREMITY VENOUS DUPLEX BILATERAL   9/5/2017 1:55 PM     HISTORY: Chest pain. Evaluate for deep venous thrombosis.    COMPARISON: None.    TECHNIQUE: Spectral doppler and waveform analysis were performed on  the bilateral lower extremity veins.    FINDINGS: The bilateral common femoral, femoral, and popliteal veins  are patent, compressible, and negative for deep venous thrombosis.  Calf veins are segmentally seen but appear negative for DVT where  visualized.        Impression     IMPRESSION:  No evidence for deep venous thrombosis in the bilateral  lower extremity veins.    SEFERINO MCGOWAN MD                Clinical Quality Measure: Blood Pressure Screening     Your blood pressure was checked while you were in the emergency department today. The last reading we obtained was  BP: 122/73 . Please read the guidelines below about what these numbers mean and what you should do about them.  If your systolic blood pressure (the top number) is less than 120 and your diastolic blood pressure (the bottom number) is less than 80, then your blood pressure is normal. There is nothing more that you need to do about it.  If your systolic blood pressure (the top number) is 120-139 or your diastolic blood pressure (the bottom number) is 80-89, your blood pressure may be higher than it should be. You should have your blood pressure rechecked within a year by a primary care provider.  If your systolic blood pressure (the top number) is 140 or greater or your diastolic blood pressure (the bottom number) is 90 or greater, you may have high blood pressure. High blood pressure is treatable, but if left untreated over time it can put you at risk for heart attack, stroke, or kidney failure. You should have your blood pressure rechecked by a  primary care provider within the next 4 weeks.  If your provider in the emergency department today gave you specific instructions to follow-up with your doctor or provider even sooner than that, you should follow that instruction and not wait for up to 4 weeks for your follow-up visit.        Thank you for choosing [facility]       Thank you for choosing [facility] for your care. Our goal is always to provide you with excellent care. Hearing back from our patients is one way we can continue to improve our services. Please take a few minutes to complete the written survey that you may receive in the mail after you visit with us. Thank you!        [employer] Information     [email or web address] gives you secure access to your electronic health record. If you see a primary care provider, you can also send messages to your care team and make appointments. If you have questions, please call your primary care clinic.  If you do not have a primary care provider, please call [phone] and they will assist you.        Care EveryWhere ID     This is your Care EveryWhere ID. This could be used by other organizations to access your [facility] medical records  [id number]        Equal Access to Services     NESS LOVELL : Hadgurdeep Ferreira, waaxda lubrooks, qaybta kaalmary anderson, pancho weiss. So [facility] [phone].    ATENCIÓN: Si habla español, tiene a felipe disposición servicios gratuitos de asistencia lingüística. Llame al [phone].    We comply with applicable federal civil rights laws and [place] laws. We do not discriminate on the basis of race, color, national origin, age, disability sex, sexual orientation or gender identity.            After Visit Summary       This is your record. Keep this with you and show to your community pharmacist(s) and doctor(s) at your next visit.

## 2017-09-05 NOTE — ED NOTES
To VQ scan off monitor.  Writer spent time discussing the study with patient and her wife for clarification.

## 2017-09-11 ENCOUNTER — HOSPITAL ENCOUNTER (EMERGENCY)
Facility: CLINIC | Age: 52
Discharge: HOME | End: 2017-09-11
Attending: EMERGENCY MEDICINE | Admitting: EMERGENCY MEDICINE
Payer: COMMERCIAL

## 2017-09-11 VITALS
DIASTOLIC BLOOD PRESSURE: 87 MMHG | WEIGHT: 155 LBS | OXYGEN SATURATION: 98 % | RESPIRATION RATE: 18 BRPM | BODY MASS INDEX: 26.46 KG/M2 | HEIGHT: 64 IN | TEMPERATURE: 97.7 F | SYSTOLIC BLOOD PRESSURE: 135 MMHG

## 2017-09-11 DIAGNOSIS — Z86.59 HISTORY OF BIPOLAR DISORDER: ICD-10-CM

## 2017-09-11 DIAGNOSIS — R45.851 SUICIDAL IDEATION: ICD-10-CM

## 2017-09-11 PROCEDURE — 99283 EMERGENCY DEPT VISIT LOW MDM: CPT

## 2017-09-11 ASSESSMENT — ENCOUNTER SYMPTOMS: HALLUCINATIONS: 0

## 2017-09-11 NOTE — ED AVS SNAPSHOT
Emergency Department    [address] [facility] [address]    Phone:  [phone]    Fax:  [phone]                                       Andie Elkins   MRN: [id number]    Department:   Emergency Department   Date of Visit:  9/11/2017           Patient Information     Date Of Birth          1965        Your diagnoses for this visit were:     Suicidal ideation - resolved     History of bipolar disorder        You were seen by Trierweiler, Chad A, MD.      Follow-up Information     Schedule an appointment as soon as possible for a visit with Christina Izaguirre MD.    Specialty:  Pediatrics    Contact information:    [address]  [place] [id number] [phone]          Follow up with  Emergency Department.    Specialty:  EMERGENCY MEDICINE    Why:  If symptoms worsen    Contact information:    [address] [facility] [id number] [phone]        Discharge Instructions       As discussed, you have contracted for safety, meaning you will call the crisis line or call 911 if you have any further thoughts of self-harm.  Continue to work with your psychiatrist and counsellers on your mental health issues.  Never hesitate to return to the ER for any other emergent concerns.    Discharge Instructions  Mental Health Concerns    You were seen today for mental health concerns, such as depression, severe anxiety, or suicidal thinking. Your doctor feels that you do not require hospitalization at this time. However, your symptoms may become worse, and you may need to return to the Emergency Department. Most treatments of depression and suicidal thoughts are a process rather than a single intervention.  Medications and counseling can take several weeks or more to help.  It is important to follow up as discussed with your family doctor or counselor.      By accepting these discharge instructions:    You promise to not harm yourself or others.    You agree that  if you feel you are becoming unable to keep that promise, you will do something to help yourself before you do anything to harm yourself or others.     You agree to keep any safety plan arranged on your visit here today.    You agree to take any medication prescribed or recommended by your doctor.    If you are getting worse, you can contact a friend or a family member, contact your counselor or family doctor, contact a crisis line, or other options discussed with the doctor or therapist today.    At any time, you can call 911 and return to the emergency department for more help.    You understand that follow-up is essential to your treatment, and you will make and keep appointments recommended on your visit today.    How to improve your mental health and prevent suicide:    Involve others by letting family, friends, counselors know.  Do not isolate yourself.    Avoid alcohol or drugs. Remove weapons, poisons from your home.    Try to stick to routines for eating, sleeping and getting regular exercise.      Try to get into sunlight. Bright natural light not only treats seasonal affective disorder but also depression.    Increase safe activities that you enjoy.    If you feel worse, contact [phone], or call 911, or your family doctor/counselor for additional assistance.    If you were given a prescription for medicine here today, be sure to read all of the information (including the package insert) that comes with your prescription.  This will include important information about the medicine, its side effects, and any warnings that you need to know about.  The pharmacist who fills the prescription can provide more information and answer questions you may have about the medicine.  If you have questions or concerns that the pharmacist cannot address, please call or return to the Emergency Department.       Future Appointments        Provider Department Dept Phone Center    9/13/2017 3:40 PM Christina Izaguirre MD  [facility] [place] [phone]       24 Hour Appointment Hotline       To make an appointment at any [facility], call [phone] ([phone]). If you don't have a family doctor or clinic, we will help you find one. [facility] are conveniently located to serve the needs of you and your family.             Review of your medicines      Our records show that you are taking the medicines listed below. If these are incorrect, please call your family doctor or clinic.        Dose / Directions Last dose taken    CLONAZEPAM PO        Refills:  0        DEPAKOTE PO   Dose:  500 mg        Take 500 mg by mouth daily   Refills:  0        docusate sodium 100 MG capsule   Commonly known as:  COLACE   Dose:  100 mg        Take 1 capsule (100 mg) by mouth At Bedtime   Refills:  0        GABAPENTIN PO   Dose:  1200 mg        Take 1,200 mg by mouth At Bedtime   Refills:  0        traZODone 100 MG tablet   Commonly known as:  DESYREL   Dose:  200 mg        Take 2 tablets (200 mg) by mouth At Bedtime   Refills:  0                Orders Needing Specimen Collection     None      Pending Results     No orders found from 9/9/2017 to 9/12/2017.            Pending Culture Results     No orders found from 9/9/2017 to 9/12/2017.            Pending Results Instructions     If you had any lab results that were not finalized at the time of your Discharge, you can call the ED Lab Result RN at [phone]. You will be contacted by this team for any positive Lab results or changes in treatment. The nurses are available 7 days a week from 10A to 6:30P.  You can leave a message 24 hours per day and they will return your call.        Test Results From Your Hospital Stay               Clinical Quality Measure: Blood Pressure Screening     Your blood pressure was checked while you were in the emergency department today. The last reading we obtained was  BP: 135/87 . Please read the guidelines below about what these  numbers mean and what you should do about them.  If your systolic blood pressure (the top number) is less than 120 and your diastolic blood pressure (the bottom number) is less than 80, then your blood pressure is normal. There is nothing more that you need to do about it.  If your systolic blood pressure (the top number) is 120-139 or your diastolic blood pressure (the bottom number) is 80-89, your blood pressure may be higher than it should be. You should have your blood pressure rechecked within a year by a primary care provider.  If your systolic blood pressure (the top number) is 140 or greater or your diastolic blood pressure (the bottom number) is 90 or greater, you may have high blood pressure. High blood pressure is treatable, but if left untreated over time it can put you at risk for heart attack, stroke, or kidney failure. You should have your blood pressure rechecked by a primary care provider within the next 4 weeks.  If your provider in the emergency department today gave you specific instructions to follow-up with your doctor or provider even sooner than that, you should follow that instruction and not wait for up to 4 weeks for your follow-up visit.        Thank you for choosing [facility]       Thank you for choosing [facility] for your care. Our goal is always to provide you with excellent care. Hearing back from our patients is one way we can continue to improve our services. Please take a few minutes to complete the written survey that you may receive in the mail after you visit with us. Thank you!        [employer] Information     [employer] gives you secure access to your electronic health record. If you see a primary care provider, you can also send messages to your care team and make appointments. If you have questions, please call your primary care clinic.  If you do not have a primary care provider, please call [phone] and they will assist you.        Care EveryWhere ID     This is your Care  EveryWhere ID. This could be used by other organizations to access your [facility] medical records  [id number]        Equal Access to Services     NESS LOVELL : Edward Ferreira, gerri em, red anderson, pancho weiss. So [facility] [phone].    ATENCIÓN: Si habla español, tiene a felipe disposición servicios gratuitos de asistencia lingüística. Llame al [phone].    We comply with applicable federal civil rights laws and [place] laws. We do not discriminate on the basis of race, color, national origin, age, disability sex, sexual orientation or gender identity.            After Visit Summary       This is your record. Keep this with you and show to your community pharmacist(s) and doctor(s) at your next visit.

## 2017-09-11 NOTE — ED PROVIDER NOTES
History     Chief Complaint:  Suicidal ideation    HPI   Andie Elkins is a 52 year old female who presents with suicidal ideation. The patient reports that she has been experiencing a lot of stress in the last month after her wife kicked her out of the house and she has been living in a hotel since. She reports that she has been having continued, fleeting thoughts of suicide which she has had for years, as well as an aggressive temper at times. She has been going to DBT and IT therapy for this and last went yesterday to DBT. She went home after this and talked to her wife which upset her. After leaving there, she had several beers before having increased thoughts of harming herself. When this occurred, she called the crisis line twice which helped calm her down. This morning she called her DBT  to let her know she would be missing therapy today as she had not slept and had work early tomorrow morning. She mentioned these fleeting thoughts of suicide while talking to her , and gave scenarios when asked how she would do it as she states her  was looking for an answer. Her  then called the police who arrived shortly after and brought her here to the ED. While here in the ED she denies any further suicidal ideation and would like to go home. She states she is going to avoid talking to her wife any further and likely file for divorce as to avoid further stress. Her last psychiatric admission to the hospital was last October, and she states she was much worse at that time. She voluntarily admitted herself during that episode and states she is currently not nearly as bad as she was at that time.     Allergies:  Codeine  Penicillins     Medications:    Depakote  Clonazepam  Gabapentin  Desyrel  Colace    Past Medical History:    Breast cancer  Depressive disorder  MI    Past Surgical History:    Lower arm surgery, reconstruction, fracture  Gyn surgery  Lumpectomy  "breast  Orthopedic surgery    Family History:    Lipids  Hypertension  Cerebrovascular disease  Depression  Diabetes  CAD  Substance abuse  Cancer  Dementia  Epilepsy  MI    Social History:  The patient was accompanied to the ED by police.  Smoking Status: No  Smokeless Tobacco: No  Alcohol Use: Yes  Marital Status:       Review of Systems   Psychiatric/Behavioral: Negative for hallucinations, self-injury and suicidal ideas.   All other systems reviewed and are negative.    Physical Exam   Vitals:  Patient Vitals for the past 24 hrs:   BP Temp Temp src Heart Rate Resp SpO2 Height Weight   09/11/17 1518 135/87 97.7  F (36.5  C) Oral 77 18 98 % 1.626 m (5' 4\") 70.3 kg (155 lb)     Physical Exam  Eye:  Pupils are equal, round, and reactive.  Extraocular movements intact.    ENT:  No rhinorrhea.  Moist mucus membranes.  Normal tongue and tonsil.    Cardiac:  Regular rate and rhythm.  No murmurs, gallops, or rubs.    Pulmonary:  Clear to auscultation bilaterally.  No wheezes, rales, or rhonchi.    Abdomen:  Positive bowel sounds.  Abdomen is soft and non-distended, without focal tenderness.    Musculoskeletal:  Normal movement of all extremities without evidence for deficit.    Skin:  Warm and dry without rashes.    Neurologic:  Non-focal exam without asymmetric weakness or numbness.     Psychiatric:  Normal affect with appropriate interaction with examiner. Patient admits to suicidal thought lasts night while intoxicated, but denies any thoughts or plans of self harm at this time.    Emergency Department Course     Emergency Department Course:  Nursing notes and vitals reviewed.  1518 I had my initial encounter with the patient.  I performed an exam of the patient as documented above.     I discussed the treatment plan with the patient. They expressed understanding of this plan and consented to discharge. They will be discharged home with instructions for care and follow up. In addition, the patient will return " to the emergency department if their symptoms persist, worsen, if new symptoms arise or if there is any concern.  All questions were answered.    Impression & Plan      Medical Decision Making:  This middle-aged woman with a history of bipolar disorderpresents by the police department for possible statements of suicidal intent.  She notes that she has been undergoing significant family stress with a impending divorce from her wife.  She describes having an argument with her wife last night, drinking alcohol, and then calling the crisis line.  She spoke with her therapist about this today while sober, describing her phone call where she was having thoughts of throwing herself off a bridge.  However, she notes that she does not feel that way at all at this current time in her sober state.  She is frustrated that she was brought here, as she notes that she has come to the hospital before when she is truly suicidal and would do so again.  She notes that she simply confided in her therapist about her fleeting thoughts of suicidality, and does not feel that she would benefit him any type of therapy here.  She requests to be discharged.  I find her to be clinically sober.  She seems very reasonable with forward thoughts such as her needs to go to work tomorrow and family obligations.  She contracts for safety.  Considering the actions from the past and the situation today, I do feel that she is safe for discharge home.  She notes that she will not hesitate to call the crisis line or return to the emergency department if she has any further thoughts of self-harm or other emergent concerns.    Diagnosis:    ICD-10-CM    1. Suicidal ideation - resolved R45.851    2. History of bipolar disorder Z86.59      Disposition:   Discharge    Scribe Disclosure:  RASHARD, Rafa Richard, am serving as a scribe at 3:28 PM on 9/11/2017 to document services personally performed by Trierweiler, Chad A, MD, based on my observations and the  provider's statements to me.    9/11/2017    EMERGENCY DEPARTMENT       Trierweiler, Chad A, MD  09/11/17 6329

## 2017-09-11 NOTE — ED NOTES
Bed: [facility]  Expected date: 9/11/17  Expected time:   Means of arrival:   Comments:  Alicja foley

## 2017-09-11 NOTE — ED AVS SNAPSHOT
Emergency Department    [address] [place] [address]    Phone:  [phone]    Fax:  [phone]                                       Andie Elkins   MRN: [id number]    Department:   Emergency Department   Date of Visit:  9/11/2017           After Visit Summary Signature Page     I have received my discharge instructions, and my questions have been answered. I have discussed any challenges I see with this plan with the nurse or doctor.    ..........................................................................................................................................  Patient/Patient Representative Signature      ..........................................................................................................................................  Patient Representative Print Name and Relationship to Patient    ..................................................               ................................................  Date                                            Time    ..........................................................................................................................................  Reviewed by Signature/Title    ...................................................              ..............................................  Date                                                            Time

## 2017-09-11 NOTE — DISCHARGE INSTRUCTIONS
As discussed, you have contracted for safety, meaning you will call the crisis line or call 911 if you have any further thoughts of self-harm.  Continue to work with your psychiatrist and counsellers on your mental health issues.  Never hesitate to return to the ER for any other emergent concerns.    Discharge Instructions  Mental Health Concerns    You were seen today for mental health concerns, such as depression, severe anxiety, or suicidal thinking. Your doctor feels that you do not require hospitalization at this time. However, your symptoms may become worse, and you may need to return to the Emergency Department. Most treatments of depression and suicidal thoughts are a process rather than a single intervention.  Medications and counseling can take several weeks or more to help.  It is important to follow up as discussed with your family doctor or counselor.      By accepting these discharge instructions:    You promise to not harm yourself or others.    You agree that if you feel you are becoming unable to keep that promise, you will do something to help yourself before you do anything to harm yourself or others.     You agree to keep any safety plan arranged on your visit here today.    You agree to take any medication prescribed or recommended by your doctor.    If you are getting worse, you can contact a friend or a family member, contact your counselor or family doctor, contact a crisis line, or other options discussed with the doctor or therapist today.    At any time, you can call 911 and return to the emergency department for more help.    You understand that follow-up is essential to your treatment, and you will make and keep appointments recommended on your visit today.    How to improve your mental health and prevent suicide:    Involve others by letting family, friends, counselors know.  Do not isolate yourself.    Avoid alcohol or drugs. Remove weapons, poisons from your home.    Try to stick to  routines for eating, sleeping and getting regular exercise.      Try to get into sunlight. Bright natural light not only treats seasonal affective disorder but also depression.    Increase safe activities that you enjoy.    If you feel worse, contact [phone], or call 911, or your family doctor/counselor for additional assistance.    If you were given a prescription for medicine here today, be sure to read all of the information (including the package insert) that comes with your prescription.  This will include important information about the medicine, its side effects, and any warnings that you need to know about.  The pharmacist who fills the prescription can provide more information and answer questions you may have about the medicine.  If you have questions or concerns that the pharmacist cannot address, please call or return to the Emergency Department.

## 2017-09-13 ENCOUNTER — OFFICE VISIT (OUTPATIENT)
Dept: FAMILY MEDICINE | Facility: CLINIC | Age: 52
End: 2017-09-13
Payer: COMMERCIAL

## 2017-09-13 VITALS
TEMPERATURE: 98.1 F | DIASTOLIC BLOOD PRESSURE: 67 MMHG | HEART RATE: 79 BPM | WEIGHT: 158 LBS | BODY MASS INDEX: 27.12 KG/M2 | SYSTOLIC BLOOD PRESSURE: 100 MMHG | OXYGEN SATURATION: 95 %

## 2017-09-13 DIAGNOSIS — Z23 NEED FOR PROPHYLACTIC VACCINATION AND INOCULATION AGAINST INFLUENZA: ICD-10-CM

## 2017-09-13 DIAGNOSIS — F31.81 BIPOLAR II DISORDER, MOST RECENT EPISODE MAJOR DEPRESSIVE (H): ICD-10-CM

## 2017-09-13 DIAGNOSIS — R63.1 EXCESSIVE THIRST: ICD-10-CM

## 2017-09-13 DIAGNOSIS — R06.02 SOB (SHORTNESS OF BREATH): Primary | ICD-10-CM

## 2017-09-13 PROCEDURE — 99214 OFFICE O/P EST MOD 30 MIN: CPT | Mod: 25 | Performed by: INTERNAL MEDICINE

## 2017-09-13 PROCEDURE — 90686 IIV4 VACC NO PRSV 0.5 ML IM: CPT | Performed by: INTERNAL MEDICINE

## 2017-09-13 PROCEDURE — 90471 IMMUNIZATION ADMIN: CPT | Performed by: INTERNAL MEDICINE

## 2017-09-13 ASSESSMENT — ANXIETY QUESTIONNAIRES
2. NOT BEING ABLE TO STOP OR CONTROL WORRYING: NEARLY EVERY DAY
3. WORRYING TOO MUCH ABOUT DIFFERENT THINGS: NEARLY EVERY DAY
1. FEELING NERVOUS, ANXIOUS, OR ON EDGE: MORE THAN HALF THE DAYS
GAD7 TOTAL SCORE: 18
5. BEING SO RESTLESS THAT IT IS HARD TO SIT STILL: NEARLY EVERY DAY
IF YOU CHECKED OFF ANY PROBLEMS ON THIS QUESTIONNAIRE, HOW DIFFICULT HAVE THESE PROBLEMS MADE IT FOR YOU TO DO YOUR WORK, TAKE CARE OF THINGS AT HOME, OR GET ALONG WITH OTHER PEOPLE: VERY DIFFICULT
6. BECOMING EASILY ANNOYED OR IRRITABLE: NEARLY EVERY DAY
7. FEELING AFRAID AS IF SOMETHING AWFUL MIGHT HAPPEN: MORE THAN HALF THE DAYS

## 2017-09-13 ASSESSMENT — PATIENT HEALTH QUESTIONNAIRE - PHQ9
5. POOR APPETITE OR OVEREATING: MORE THAN HALF THE DAYS
SUM OF ALL RESPONSES TO PHQ QUESTIONS 1-9: 20

## 2017-09-13 NOTE — PROGRESS NOTES
Injectable Influenza Immunization Documentation    1.  Are you sick today? (Fever of 100.5 or higher on the day of the clinic)   No    2.  Have you ever had Guillain-Franklin Syndrome within 6 weeks of an influenza vaccionation?  No    3. Do you have a life-threatening allergy to eggs?  No    4. Do you have a life-threatening allergy to a component of the vaccine? May include antibiotics, gelatin or latex.  No     5. Have you ever had a reaction to a dose of flu vaccine that needed immediate medical attention?  No     Form completed by Deena Ventura MA

## 2017-09-13 NOTE — MR AVS SNAPSHOT
After Visit Summary   9/13/2017    Andie Elkins    MRN: [id number]           Patient Information     Date Of Birth          1965        Visit Information        Provider Department      9/13/2017 3:40 PM Christina Izaguirre MD [facility] [place]        Today's Diagnoses     SOB (shortness of breath)    -  1    Bipolar II disorder, most recent episode major depressive (H)        Excessive thirst        Need for prophylactic vaccination and inoculation against influenza           Follow-ups after your visit        Who to contact     If you have questions or need follow up information about today's clinic visit or your schedule please contact [facility] [place] directly at [phone].  Normal or non-critical lab and imaging results will be communicated to you by CAP[employer]hart, letter or phone within 4 business days after the clinic has received the results. If you do not hear from us within 7 days, please contact the clinic through CAP[employer]hart or phone. If you have a critical or abnormal lab result, we will notify you by phone as soon as possible.  Submit refill requests through [employer] or call your pharmacy and they will forward the refill request to us. Please allow 3 business days for your refill to be completed.          Additional Information About Your Visit        MyChart Information     [employer] gives you secure access to your electronic health record. If you see a primary care provider, you can also send messages to your care team and make appointments. If you have questions, please call your primary care clinic.  If you do not have a primary care provider, please call [phone] and they will assist you.        Care EveryWhere ID     This is your Care EveryWhere ID. This could be used by other organizations to access your [facility] medical records  [id number]        Your Vitals Were     Pulse Temperature Last Period Pulse Oximetry BMI (Body Mass Index)       79 98.1   F (36.7  C) (Oral) 05/29/2008 95% 27.12 kg/m2        Blood Pressure from Last 3 Encounters:   09/13/17 100/67   09/11/17 135/87   09/05/17 122/73    Weight from Last 3 Encounters:   09/13/17 158 lb (71.7 kg)   09/11/17 155 lb (70.3 kg)   09/05/17 161 lb 9.6 oz (73.3 kg)              We Performed the Following     FLU VAC, SPLIT VIRUS IM > 3 YO (QUADRIVALENT) [39571]     Vaccine Administration, Initial [54658]        Primary Care Provider Office Phone # Fax #    Christina Izaguirre [phone]        [address]  [place] [address]        Equal Access to Services     [facility]: Hadii camilo ramirez hadasho Soomaali, waaxda luqadaha, qaybta kaalmada adeegyada, waxamerica nelsonin hayagustin herrera . So [facility] [phone].    ATENCIÓN: Si habla español, tiene a felipe disposición servicios gratuitos de asistencia lingüística. Llame al [phone].    We comply with applicable federal civil rights laws and [place] laws. We do not discriminate on the basis of race, color, national origin, age, disability sex, sexual orientation or gender identity.            Thank you!     Thank you for choosing [facility] [place]  for your care. Our goal is always to provide you with excellent care. Hearing back from our patients is one way we can continue to improve our services. Please take a few minutes to complete the written survey that you may receive in the mail after your visit with us. Thank you!             Your Updated Medication List - Protect others around you: Learn how to safely use, store and throw away your medicines at www.disposemymeds.org.          This list is accurate as of: 9/13/17 11:59 PM.  Always use your most recent med list.                   Brand Name Dispense Instructions for use Diagnosis    CLONAZEPAM PO           DEPAKOTE PO      Take 500 mg by mouth daily        docusate sodium 100 MG capsule    COLACE     Take 1 capsule (100 mg) by mouth At Bedtime        GABAPENTIN  PO      Take 1,200 mg by mouth At Bedtime        traZODone 100 MG tablet    DESYREL     Take 2 tablets (200 mg) by mouth At Bedtime
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
## 2017-09-14 ASSESSMENT — ANXIETY QUESTIONNAIRES: GAD7 TOTAL SCORE: 18

## 2017-09-22 ENCOUNTER — HOSPITAL ENCOUNTER (EMERGENCY)
Facility: CLINIC | Age: 52
Discharge: HOME | End: 2017-09-22
Attending: EMERGENCY MEDICINE | Admitting: EMERGENCY MEDICINE
Payer: COMMERCIAL

## 2017-09-22 VITALS
BODY MASS INDEX: 27.31 KG/M2 | DIASTOLIC BLOOD PRESSURE: 76 MMHG | TEMPERATURE: 96.7 F | HEIGHT: 64 IN | RESPIRATION RATE: 18 BRPM | HEART RATE: 69 BPM | SYSTOLIC BLOOD PRESSURE: 131 MMHG | OXYGEN SATURATION: 96 % | WEIGHT: 160 LBS

## 2017-09-22 DIAGNOSIS — F33.0 MAJOR DEPRESSIVE DISORDER, RECURRENT EPISODE, MILD (H): ICD-10-CM

## 2017-09-22 PROCEDURE — 99283 EMERGENCY DEPT VISIT LOW MDM: CPT

## 2017-09-22 ASSESSMENT — ENCOUNTER SYMPTOMS: AGITATION: 1

## 2017-09-22 NOTE — ED AVS SNAPSHOT
Emergency Department    [address] [place] [address]    Phone:  [phone]    Fax:  [phone]                                       Andie Elkins   MRN: [id number]    Department:   Emergency Department   Date of Visit:  9/22/2017           After Visit Summary Signature Page     I have received my discharge instructions, and my questions have been answered. I have discussed any challenges I see with this plan with the nurse or doctor.    ..........................................................................................................................................  Patient/Patient Representative Signature      ..........................................................................................................................................  Patient Representative Print Name and Relationship to Patient    ..................................................               ................................................  Date                                            Time    ..........................................................................................................................................  Reviewed by Signature/Title    ...................................................              ..............................................  Date                                                            Time

## 2017-09-22 NOTE — ED AVS SNAPSHOT
Emergency Department    [address] [facility] [address]    Phone:  [phone]    Fax:  [phone]                                       Andie Elkins   MRN: [id number]    Department:   Emergency Department   Date of Visit:  9/22/2017           Patient Information     Date Of Birth          1965        Your diagnoses for this visit were:     Major depressive disorder, recurrent episode, mild (H)        You were seen by Cande Ramey MD.      Follow-up Information     Follow up with Follow up with your DBT/Therapist.        Follow up with  Emergency Department.    Specialty:  EMERGENCY MEDICINE    Why:  As needed, If symptoms worsen    Contact information:    [address] [facility] [address] [phone]        Discharge Instructions         Depression  Depression is one of the most common mental health problems today. It is not just a state of unhappiness or sadness. It is a true disease. The cause seems to be related to a decrease in chemicals that transmit signals in the brain. Having a family history of depression, alcoholism, or suicide increases the risk. Chronic illness, chronic pain, migraine headaches and high emotional stress also increase the risk.  Depression is something we tend to recognize in others, but may have a hard time seeing in ourselves. It can show in many physical and emotional ways:    Loss of appetite    Over-eating    Not being able to sleep    Sleeping too much    Tiredness not related to physical exertion    Restlessness or irritability    Slowness of movement or speech    Feeling depressed or withdrawn    Loss of interest in things you once enjoyed    Trouble concentrating, poor memory, trouble making decisions    Thoughts of harming or killing oneself, or thoughts that life is not worth living    Low self-esteem  The treatment for depression may include both medicine and psychotherapy. Antidepressants can reduce  suffering and can improve the ability to function during the depressed period. Therapy can offer emotional support and help you understand emotional factors that may be causing the depression.  Home care    On-going care and support helps people manage this disease.  Find a healthcare provider and therapist who meet your needs. Seek help when you feel like you may be getting ill.    Be kind to yourself. Make it a point to do things that you enjoy (gardening, walking in nature, going to a movie, etc.). Reward yourself for small successes.    Take care of your physical body. Eat a balanced diet (low in saturated fat and high in fruits and vegetables). Exercise at least 3 times a week for 30 minutes. Even mild-moderate exercise (like brisk walking) can make you feel better.    Avoid alcohol, which can make depression worse.    Take medicine as prescribed.    Tell each of your healthcare providers about all of the prescription drugs, over-the-counter medicines, vitamins, and supplements you take. Certain supplements interact with medicines and can result in dangerous side effects. Ask your pharmacist when you have questions about drug interactions.    Talk with your family and trusted friends about your feelings and thoughts. Ask them to help you recognize behavior changes early so you can get help and, if needed, medicine can be adjusted.  Follow-up care  Follow up with your healthcare provider, or as advised.  Call 911  Call 911 if you:    Have suicidal thoughts, a suicide plan, and the means to carry out the plan    Have trouble breathing    Are very confused    Feel very drowsy or have trouble awakening    Faint or lose consciousness    Have new chest pain that becomes more severe, lasts longer, or spreads into your shoulder, arm, neck, jaw or back  When to seek medical advice  Call your healthcare provider right away if any of these occur:    Feeling extreme depression, fear, anxiety, or anger toward yourself or  others    Feeling out of control    Feeling that you may try to harm yourself or another    Hearing voices that others do not hear    Seeing things that others do not see    Can t sleep or eat for 3 days in a row    Friends or family express concern over your behavior and ask you to seek help  Date Last Reviewed: 9/29/2015 2000-2017 [employer]. [address] [place] [address], [place] [address]. All rights reserved. This information is not intended as a substitute for professional medical care. Always follow your healthcare professional's instructions.          24 Hour Appointment Hotline       To make an appointment at any [facility], call [phone] ([phone]). If you don't have a family doctor or clinic, we will help you find one. [facility] clinics are conveniently located to serve the needs of you and your family.             Review of your medicines      Our records show that you are taking the medicines listed below. If these are incorrect, please call your family doctor or clinic.        Dose / Directions Last dose taken    CLONAZEPAM PO        Refills:  0        DEPAKOTE PO   Dose:  500 mg        Take 500 mg by mouth daily   Refills:  0        docusate sodium 100 MG capsule   Commonly known as:  COLACE   Dose:  100 mg        Take 1 capsule (100 mg) by mouth At Bedtime   Refills:  0        GABAPENTIN PO   Dose:  1200 mg        Take 1,200 mg by mouth At Bedtime   Refills:  0        traZODone 100 MG tablet   Commonly known as:  DESYREL   Dose:  200 mg        Take 2 tablets (200 mg) by mouth At Bedtime   Refills:  0                Orders Needing Specimen Collection     None      Pending Results     No orders found from 9/20/2017 to 9/23/2017.            Pending Culture Results     No orders found from 9/20/2017 to 9/23/2017.            Pending Results Instructions     If you had any lab results that were not finalized at the time of your Discharge, you can call the ED Lab Result RN at  [phone]. You will be contacted by this team for any positive Lab results or changes in treatment. The nurses are available 7 days a week from 10A to 6:30P.  You can leave a message 24 hours per day and they will return your call.        Test Results From Your Hospital Stay               Clinical Quality Measure: Blood Pressure Screening     Your blood pressure was checked while you were in the emergency department today. The last reading we obtained was  BP: 131/76 . Please read the guidelines below about what these numbers mean and what you should do about them.  If your systolic blood pressure (the top number) is less than 120 and your diastolic blood pressure (the bottom number) is less than 80, then your blood pressure is normal. There is nothing more that you need to do about it.  If your systolic blood pressure (the top number) is 120-139 or your diastolic blood pressure (the bottom number) is 80-89, your blood pressure may be higher than it should be. You should have your blood pressure rechecked within a year by a primary care provider.  If your systolic blood pressure (the top number) is 140 or greater or your diastolic blood pressure (the bottom number) is 90 or greater, you may have high blood pressure. High blood pressure is treatable, but if left untreated over time it can put you at risk for heart attack, stroke, or kidney failure. You should have your blood pressure rechecked by a primary care provider within the next 4 weeks.  If your provider in the emergency department today gave you specific instructions to follow-up with your doctor or provider even sooner than that, you should follow that instruction and not wait for up to 4 weeks for your follow-up visit.        Thank you for choosing [facility]       Thank you for choosing [facility] for your care. Our goal is always to provide you with excellent care. Hearing back from our patients is one way we can continue to improve our services. Please  take a few minutes to complete the written survey that you may receive in the mail after you visit with us. Thank you!        [employer] Information     [employer] gives you secure access to your electronic health record. If you see a primary care provider, you can also send messages to your care team and make appointments. If you have questions, please call your primary care clinic.  If you do not have a primary care provider, please call [phone] and they will assist you.        Care EveryWhere ID     This is your Care EveryWhere ID. This could be used by other organizations to access your [facility] medical records  [id number]        Equal Access to Services     [facility] : Edward Ferreira, gerri em, red anderson, pancho weiss. So [facility] [phone].    ATENCIÓN: Si habla español, tiene a felipe disposición servicios gratuitos de asistencia lingüística. Tammyame al [phone].    We comply with applicable federal civil rights laws and [place] laws. We do not discriminate on the basis of race, color, national origin, age, disability sex, sexual orientation or gender identity.            After Visit Summary       This is your record. Keep this with you and show to your community pharmacist(s) and doctor(s) at your next visit.

## 2017-09-23 NOTE — ED PROVIDER NOTES
"  History     Chief Complaint:  Psychiatric Evaluation         HPI   Andie Elkins is a 52 year old female who presents via EMS for psychiatric evaluation. The patient states that she is currently legally  from her wife and that she is no longer living in their house. They are trying to go through marriage counseling and met up for dinner tonight to discuss this. While at dinner they began arguing and the patient admits to getting emotional and \"hyper\". She states that her wife got scared and called 911 and that now her wife thinks she is going to kill herself because of the argument and subsequent contact of the police. She notes that she was here a week and a half ago for suicidal ideations and admits she had a passive plan to kill herself at that time. She states that now she has no suicidal ideations and that she promised her uncle she would never hurt herself and she prides herself on being a woman of her word. Patient denies making suicidal statements tonight. Patient denies all other complaints.     Allergies:  Codeine  Penicillins      Medications:    Depakote  Clonazepam  Gabapentin  Desyrel  Colace    Past Medical History:    Breast Cancer  Depression  MI  Seizure  TIA  Suicidal Ideation  Bipolar II Disorder  Hyperlipidemia  Lumbago  Gastroesophageal Reflux Disease    Past Surgical History:    Lower Arm Surgery  Gyn Surgery  Lumpectomy Breast - Left  Orthopedic Surgery    Family History:    Lipids  Hypertension  Cerebrovascular Disease  Depression  Diabetes  Heart Disease - Heart Murmur  CAD - CABG x 4  Epilepsy  Alcohol/Drug  MI     Social History:  Presents alone   Tobacco use: former 3.00 pack/day smoker for 4 years, QD: 1/1/1985  Alcohol use: occasional   PCP: Christina Izaguirre    Marital Status:  Legally       Review of Systems   Psychiatric/Behavioral: Positive for agitation. Negative for suicidal ideas.   All other systems reviewed and are negative.    Physical Exam " "    Patient Vitals for the past 24 hrs:   BP Temp Temp src Pulse Resp SpO2 Height Weight   09/22/17 2102 131/76 96.7  F (35.9  C) Oral 69 18 96 % 1.626 m (5' 4\") 72.6 kg (160 lb)       Physical Exam  General/Appearance: appears stated age, well-groomed, appears comfortable but slightly agitated at situation  Eyes: EOMI, PERRL, no scleral injection, no icterus  ENT: MMM  Neck: supple, nl ROM, no stiffness  Cardiovascular: RRR, nl S1S2, no m/r/g, 2+ pulses in all 4 extremities, cap refill <2sec  Respiratory: CTAB, good air movement throughout, no wheezes/rhonchi/rales, no increased WOB, no retractions  MSK: HELM, good tone, no bony abnormality  Skin: warm and well-perfused, no rash, no edema, no ecchymosis, nl turgor  Neuro: GCS 15, alert and oriented, no gross focal neuro deficits  Psych: Appearance: Casually dressed, appears stated age.     Attitude: Cooperative.     Eye Contact: Fair.     Speech: Regular rate rhythm normal volume and tone but intermittently with elevated volume/tone    Psychomotor Behavior: normal but intermittently slightly agitated    Mood: depressed and stressed    Affect: stressed, slightly tearful, slightly agitated    Thought Process: Intact    Thought Content: - SI. - HI. - paranoia. - hallucinations    Oriented to: Person place and time.     Attention Span and Concentration: Intact     Recent and Remote Memory: Intact  Heme: no petechia, no purpura, no active bleeding  Lymph: No cervical LAD      Emergency Department Course   Emergency Department Course:  Past medical records, nursing notes, and vitals reviewed.  2103: I performed an exam of the patient as documented above. Clinical findings and plan explained to the Patient. Patient discharged home with instructions regarding supportive care, medications, and reasons to return as well as the importance of close follow-up were reviewed.      Impression & Plan    Medical Decision Making:  This patient is a 52-year-old female who presents " after her wife call 911 stating she was concerned the patient was suicidal. The patient describes recent significant life stressors associated with the wife that are exacerbated when they need To try to discuss a relationship. This happened this evening. A similar event happened approximately 10 days ago which point the patient does endorse she was having more passive suicidal thoughts. At that time she was evaluated by thais and ultimately discharged home. It sounds as though she is currently not having suicidal thoughts. She does admit to being very agitated and upset with the current situation however this is not associated with suicidal ideation. She seems to be a good historian. She does state that she has someone in town who she feels she can count on and home she is made a verbal contract for CT. At this point time I feel that she has a capacity to make her own decisions and don't feel that she is in imminent danger or harm for herself. She already is hooked up with therapist and DVT. She should continue to follow up with them. She has contracted verbally for safety with me and stated that if she has new suicidal ideations she will return to the ED.      Diagnosis:    ICD-10-CM   1. Major depressive disorder, recurrent episode, mild (H) F33.0       Disposition:  Discharged to home.       9/22/2017    EMERGENCY DEPARTMENT  RASHARD, Breonna Duron, am serving as a scribe at 9:03 PM on 9/22/2017 to document services personally performed by Cande Ramey based on my observations and the provider's statements to me.       Cande Ramey MD  09/22/17 2696

## 2017-09-23 NOTE — DISCHARGE INSTRUCTIONS

## 2017-09-25 ENCOUNTER — CARE COORDINATION (OUTPATIENT)
Dept: CARE COORDINATION | Facility: CLINIC | Age: 52
End: 2017-09-25

## 2017-09-25 NOTE — PROGRESS NOTES
Clinic Care Coordination Contact  [facility]/Voicemail    Referral Source: ED Follow-Up  Clinical Data: Care Coordinator Outreach  Outreach attempted x 1.  Left message on voicemail with call back information and requested return call.  Plan: Care Coordinator ED follow up. Care Coordinator will try to reach patient again in 1-2 business days.  Grecia Fontanez [facility]  Clinic Care Coordinator-  Clinics: [place], [place], [place]  E-Mail: [email or web address]  Telephone: [phone]

## 2017-09-29 ENCOUNTER — CARE COORDINATION (OUTPATIENT)
Dept: CARE COORDINATION | Facility: CLINIC | Age: 52
End: 2017-09-29

## 2017-09-29 NOTE — PROGRESS NOTES
Clinic Care Coordination Contact  [facility]/Voicemail    Referral Source: ED Follow-Up  Clinical Data: Care Coordinator Outreach  Outreach attempted x 2.  Left message on voicemail with call back information and requested return call.  Plan: Care Coordinator will mail out care coordination introduction letter with care coordinator contact information and explanation of care coordination services along with the 24 Hour Access Care Plan. Care Coordinator will do no further outreaches at this time.  Grecia Fontanez [facility]  Clinic Care Coordinator-  Clinics: [place], [place], [place]  E-Mail: [email or web address]  Telephone: [phone]

## 2017-09-29 NOTE — LETTER
Health Care Home - Access Care Plan    About Me  Patient Name:  Andie Elkins    YOB: 1965  Age:                            52 year old   Shanelle MRN:         [id number] Telephone Information:     Home Phone [phone]   Mobile [phone]       Address:    [address] [place] [address] Email address:  [email or web address]      Emergency Contact(s)  Name Relationship Lgl Grd Work Phone Home Phone Mobile Phone   1. NONE PER PT 9/* Other   [phone]    2. ERWIN GORDON Spouse  none [phone]             Health Maintenance: Routine Health maintenance Reviewed: Due/Overdue     My Access Plan  Medical Emergency 911   Questions or concerns during clinic hours Primary Clinic Line, I will call the clinic directly: [facility] [phone]   24 Hour Appointment Line [phone] or  [phone])  (toll free)   24 Hour Nurse Line [phone] (toll free)   Questions or concerns outside clinic hours 24 Hour Appointment Line, I will call the after-hours on-call line:   [facility] [phone] or [phone]) (toll-free)   Preferred Urgent Care Preferred Urgent Care: Other   Preferred Hospital Preferred Hospital: [facility]  [phone]   Preferred Pharmacy [facility] [address]  [place], [place] - [address] [facility]     Behavioral Health Crisis Line The National Suicide Prevention Lifeline at 1-810.469.8772 or 911     My Care Team Members  Patient Care Team       Relationship Specialty Notifications Start End    Christina Izaguirre MD PCP - General Pediatrics  12/12/16     Phone: [phone] Fax: [phone]         [address] [place] [address]    Mesha Krishnan PsyD Psychologist Psychology Admissions 12/12/13     Phone: [phone] Fax: [phone]         [address] [facility] [address]        My Medical and Care Information  Problem  List   Patient Active Problem List   Diagnosis     CARDIOVASCULAR SCREENING; LDL GOAL LESS THAN 160     Lumbago     Hyperlipidemia LDL goal <160     Left shoulder pain     Bipolar II disorder, most recent episode major depressive (H)     Major depressive disorder, recurrent episode, mild (H)     Gastroesophageal reflux disease, esophagitis presence not specified     Nausea     Personal history of malignant neoplasm of breast     Suicidal ideation     TIA (transient ischemic attack)     Seizure (H)      Current Medications and Allergies:  See printed Medication Report

## 2017-09-29 NOTE — LETTER
[place] CARE COORDINATION  [address] [facility] [address]  Phone: [phone]      September 29, 2017      Andie Elkins  [address]  [facility] [address]    Dear Andie,  I am the Clinic Care Coordinator that works with your primary care provider's clinic. I wanted to introduce myself and provide you with my contact information for you to be able to call me with any questions or concerns. Below is a description of what Clinic Care Coordination is and how I can further assist you.     The Clinic Care Coordinator role is a Registered Nurse and/or  who understands the health care system. The goal of Clinic Care Coordination is to help you manage your health and improve access to the [facility] system in the most efficient manner.  The Registered Nurse can assist you in meeting your health care goals by providing education, coordinating services, and strengthening the communication among your providers. The  can assist you with financial, behavioral, psychosocial, and chemical dependency and counseling/psychiatric resources.    Please feel free to keep this letter and contact information to contact me at [phone] with any further questions or concerns that may arise. We at [facility] are focused on providing you with the highest-quality healthcare experience possible and that all starts with you.       Sincerely,     Bridgette Fontanez    Enclosed: I have enclosed a copy of a 24 Hour Access Plan. This has helpful phone numbers for you to call when needed. Please keep this in an easy to access place to use as needed. Please let clinic staff know if there is any missing information. Thank you!

## 2017-10-03 ENCOUNTER — CARE COORDINATION (OUTPATIENT)
Dept: CARE COORDINATION | Facility: CLINIC | Age: 52
End: 2017-10-03

## 2017-10-03 NOTE — PROGRESS NOTES
Clinic Care Coordination Contact    Situation: Patient chart reviewed by care coordinator.    Background: CC SW more than 2 out reaches; unable to contact and pt no return call    Assessment: Pt could benefit ffrom CC SW services    Plan/Recommendations: Change status to Inactive    Grecia Fontanez [facility]  Clinic Care Coordinator-  Clinics: [place], [place], [place]  E-Mail: [email or web address]  Telephone: [phone]

## 2017-10-12 ENCOUNTER — APPOINTMENT (OUTPATIENT)
Dept: GENERAL RADIOLOGY | Facility: CLINIC | Age: 52
End: 2017-10-12
Attending: EMERGENCY MEDICINE
Payer: COMMERCIAL

## 2017-10-12 ENCOUNTER — HOSPITAL ENCOUNTER (EMERGENCY)
Facility: CLINIC | Age: 52
Discharge: HOME | End: 2017-10-12
Attending: EMERGENCY MEDICINE | Admitting: EMERGENCY MEDICINE
Payer: COMMERCIAL

## 2017-10-12 VITALS
WEIGHT: 150 LBS | SYSTOLIC BLOOD PRESSURE: 122 MMHG | RESPIRATION RATE: 18 BRPM | HEIGHT: 64 IN | TEMPERATURE: 98.9 F | BODY MASS INDEX: 25.61 KG/M2 | OXYGEN SATURATION: 99 % | DIASTOLIC BLOOD PRESSURE: 71 MMHG

## 2017-10-12 DIAGNOSIS — J11.1 INFLUENZA-LIKE ILLNESS: ICD-10-CM

## 2017-10-12 LAB
ALBUMIN UR-MCNC: 10 MG/DL
APPEARANCE UR: CLEAR
BACTERIA #/AREA URNS HPF: ABNORMAL /HPF
BILIRUB UR QL STRIP: NEGATIVE
COLOR UR AUTO: YELLOW
DEPRECATED S PYO AG THROAT QL EIA: NORMAL
GLUCOSE UR STRIP-MCNC: NEGATIVE MG/DL
HGB UR QL STRIP: ABNORMAL
KETONES UR STRIP-MCNC: NEGATIVE MG/DL
LEUKOCYTE ESTERASE UR QL STRIP: ABNORMAL
MUCOUS THREADS #/AREA URNS LPF: PRESENT /LPF
NITRATE UR QL: NEGATIVE
PH UR STRIP: 5.5 PH (ref 5–7)
RBC #/AREA URNS AUTO: 9 /HPF (ref 0–2)
SOURCE: ABNORMAL
SP GR UR STRIP: 1.02 (ref 1–1.03)
SPECIMEN SOURCE: NORMAL
SQUAMOUS #/AREA URNS AUTO: 2 /HPF (ref 0–1)
UROBILINOGEN UR STRIP-MCNC: NORMAL MG/DL (ref 0–2)
WBC #/AREA URNS AUTO: 2 /HPF (ref 0–2)

## 2017-10-12 PROCEDURE — 71020 XR CHEST 2 VW: CPT

## 2017-10-12 PROCEDURE — 81001 URINALYSIS AUTO W/SCOPE: CPT | Performed by: EMERGENCY MEDICINE

## 2017-10-12 PROCEDURE — 87880 STREP A ASSAY W/OPTIC: CPT | Performed by: EMERGENCY MEDICINE

## 2017-10-12 PROCEDURE — 87081 CULTURE SCREEN ONLY: CPT | Performed by: EMERGENCY MEDICINE

## 2017-10-12 PROCEDURE — 99284 EMERGENCY DEPT VISIT MOD MDM: CPT | Mod: 25

## 2017-10-12 RX ORDER — BENZONATATE 200 MG/1
200 CAPSULE ORAL 3 TIMES DAILY PRN
Qty: 21 CAPSULE | Refills: 0 | Status: SHIPPED | OUTPATIENT
Start: 2017-10-12 | End: 2018-10-08

## 2017-10-12 ASSESSMENT — ENCOUNTER SYMPTOMS
ABDOMINAL PAIN: 1
SHORTNESS OF BREATH: 0
RHINORRHEA: 1
CHILLS: 1
APPETITE CHANGE: 1
VOMITING: 0
NAUSEA: 0
FEVER: 0
SORE THROAT: 1
MYALGIAS: 1
DIARRHEA: 0
COUGH: 1

## 2017-10-12 NOTE — LETTER
To Whom it may concern:      Andie Elkins was seen in our Emergency Department today, 10/12/17.  I expect her condition to improve over the next 3 days.  She may return to work/school when improved.    Sincerely,        Eddie Manrique MD

## 2017-10-12 NOTE — ED AVS SNAPSHOT
Emergency Department    [address] [facility] [address]    Phone:  [phone]    Fax:  [phone]                                       Andie Elkins   MRN: [id number]    Department:   Emergency Department   Date of Visit:  10/12/2017           Patient Information     Date Of Birth          1965        Your diagnoses for this visit were:     Influenza-like illness        You were seen by Eddie Manrique MD.      Follow-up Information     Follow up with Christina Izaguirre MD In 1 week.    Specialty:  Pediatrics    Contact information:    [address] [place] [address] [address]  [place] [address]  [phone]          Follow up with  Emergency Department.    Specialty:  EMERGENCY MEDICINE    Why:  As needed, If symptoms worsen    Contact information:    [address] [facility] [address] [phone]        Discharge Instructions       Discharge Instructions  Influenza Like Illness    You were diagnosed today with influenza or influenza like illness.  Influenza is a respiratory (breathing) illness caused by influenza A or B viruses.  Influenza causes five primary symptoms: fever, headache, muscle aches/fatigue/malaise, sore throat and cough.  These symptoms start one to four days after you have been around a person with this illness. Influenza is spread through sneezing and coughing and can live on surfaces for several days.  It is usually contagious for 5 days but in some cases up to 10 days and often affects several family members. If you have a family member who is less than 2 years old, older than 65 years old, pregnant or has a serious medical condition, they should be seen right away by a provider to decide if they should take preventative medications. Although influenza will make you feel very ill, most patients don t require any specific treatment. An antiviral medication might be prescribed for certain groups of patients (older patients, younger patients,  and those with certain chronic medical problems).    Generally, every Emergency Department visit should have a follow-up clinic visit with either a primary or a specialty clinic/provider. Please follow-up as instructed by your emergency provider today.    Return to the Emergency Department if:    You have trouble breathing.    You develop pain in your chest.    You have signs of being dehydrated, such as dizziness or unable to urinate (pee) at least three times daily.    You are confused or severely weak.    You cannot stop vomiting (throwing up) or you cannot drink enough fluids.    In children, you should seek help if the child has any of the above or if child:    Has blue or purplish skin color.    Is so irritable that he or she does not want to be held.    Does not have tears when crying (in infants) or does not urinate at least three times daily.    Does not wake up easily.    What can I do to help myself?    Rest.    Fluids -- Drink hydrating solutions such as Gatorade  or Pedialyte  as often as you can. If you are drinking enough, you should pass urine at least every eight hours.    Tylenol  (acetaminophen) and Advil  (ibuprofen) can relieve fever, headache, and muscle aches. Do not give aspirin to children under 18 years old.     Antiviral treatment -- Antiviral medicines do not make the flu symptoms go away immediately.  They have only been shown to make the symptoms go away 12 to 24 hours sooner than they would without treatment.       Antibiotics -- Antibiotics are NOT useful for treating viral illnesses such as influenza. Antibiotics should only be used if there is a bacterial complication of the flu such as bacterial pneumonia, ear infection, or sinusitis.    Because you were diagnosed with a flu like illness you are very contagious.  This means you cannot work, attend school or  for at least 24 hours or until you no longer have a fever.  If you were given a prescription for medicine here today,  be sure to read all of the information (including the package insert) that comes with your prescription.  This will include important information about the medicine, its side effects, and any warnings that you need to know about.  The pharmacist who fills the prescription can provide more information and answer questions you may have about the medicine.  If you have questions or concerns that the pharmacist cannot address, please call or return to the Emergency Department.   Remember that you can always come back to the Emergency Department if you are not able to see your regular provider in the amount of time listed above, if you get any new symptoms, or if there is anything that worries you.    24 Hour Appointment Hotline       To make an appointment at any [facility], call [phone] ([phone]). If you don't have a family doctor or clinic, we will help you find one. [facility] clinics are conveniently located to serve the needs of you and your family.             Review of your medicines      START taking        Dose / Directions Last dose taken    benzonatate 200 MG capsule   Commonly known as:  TESSALON   Dose:  200 mg   Quantity:  21 capsule        Take 1 capsule (200 mg) by mouth 3 times daily as needed for cough   Refills:  0          Our records show that you are taking the medicines listed below. If these are incorrect, please call your family doctor or clinic.        Dose / Directions Last dose taken    CLONAZEPAM PO        Refills:  0        DEPAKOTE PO   Dose:  500 mg        Take 500 mg by mouth daily   Refills:  0        docusate sodium 100 MG capsule   Commonly known as:  COLACE   Dose:  100 mg        Take 1 capsule (100 mg) by mouth At Bedtime   Refills:  0        GABAPENTIN PO   Dose:  1200 mg        Take 1,200 mg by mouth At Bedtime   Refills:  0        traZODone 100 MG tablet   Commonly known as:  DESYREL   Dose:  200 mg        Take 2 tablets (200 mg) by mouth At Bedtime   Refills:  0         VENLAFAXINE HCL PO        Refills:  0                Prescriptions were sent or printed at these locations (1 Prescription)                   Other Prescriptions                Printed at Department/Unit printer (1 of 1)         benzonatate (TESSALON) 200 MG capsule                Procedures and tests performed during your visit     Beta strep group A culture    Rapid strep screen    UA with Microscopic    XR Chest 2 Views      Orders Needing Specimen Collection     None      Pending Results     Date and Time Order Name Status Description    10/12/2017 0948 Beta strep group A culture In process             Pending Culture Results     Date and Time Order Name Status Description    10/12/2017 0948 Beta strep group A culture In process             Pending Results Instructions     If you had any lab results that were not finalized at the time of your Discharge, you can call the ED Lab Result RN at [phone]. You will be contacted by this team for any positive Lab results or changes in treatment. The nurses are available 7 days a week from 10A to 6:30P.  You can leave a message 24 hours per day and they will return your call.        Test Results From Your Hospital Stay        10/12/2017 10:19 AM      Component Results     Component Value Ref Range & Units Status    Color Urine Yellow  Final    Appearance Urine Clear  Final    Glucose Urine Negative NEG^Negative mg/dL Final    Bilirubin Urine Negative NEG^Negative Final    Ketones Urine Negative NEG^Negative mg/dL Final    Specific Gravity Urine 1.017 1.003 - 1.035 Final    Blood Urine Trace (A) NEG^Negative Final    pH Urine 5.5 5.0 - 7.0 pH Final    Protein Albumin Urine 10 (A) NEG^Negative mg/dL Final    Urobilinogen mg/dL Normal 0.0 - 2.0 mg/dL Final    Nitrite Urine Negative NEG^Negative Final    Leukocyte Esterase Urine Small (A) NEG^Negative Final    Source Midstream Urine  Final    WBC Urine 2 0 - 2 /HPF Final    RBC Urine 9 (H) 0 - 2 /HPF Final     Bacteria Urine Few (A) NEG^Negative /HPF Final    Squamous Epithelial /HPF Urine 2 (H) 0 - 1 /HPF Final    Mucous Urine Present (A) NEG^Negative /LPF Final         10/12/2017 10:24 AM      Component Results     Component    Specimen Description    Throat    Rapid Strep A Screen    NEGATIVE: No Group A streptococcal antigen detected by immunoassay, await culture report.         10/12/2017 10:17 AM      Narrative     XR CHEST 2 VW 10/12/2017 10:09 AM    HISTORY: Cough.    COMPARISON: 9/5/2017    FINDINGS: No airspace consolidation, pleural effusion or pneumothorax.  Normal heart size.        Impression     IMPRESSION: No acute cardiopulmonary abnormality.    REINA RODRÍGUEZ MD         10/12/2017 10:25 AM                Clinical Quality Measure: Blood Pressure Screening     Your blood pressure was checked while you were in the emergency department today. The last reading we obtained was  BP: 118/69 . Please read the guidelines below about what these numbers mean and what you should do about them.  If your systolic blood pressure (the top number) is less than 120 and your diastolic blood pressure (the bottom number) is less than 80, then your blood pressure is normal. There is nothing more that you need to do about it.  If your systolic blood pressure (the top number) is 120-139 or your diastolic blood pressure (the bottom number) is 80-89, your blood pressure may be higher than it should be. You should have your blood pressure rechecked within a year by a primary care provider.  If your systolic blood pressure (the top number) is 140 or greater or your diastolic blood pressure (the bottom number) is 90 or greater, you may have high blood pressure. High blood pressure is treatable, but if left untreated over time it can put you at risk for heart attack, stroke, or kidney failure. You should have your blood pressure rechecked by a primary care provider within the next 4 weeks.  If your provider in the emergency department  today gave you specific instructions to follow-up with your doctor or provider even sooner than that, you should follow that instruction and not wait for up to 4 weeks for your follow-up visit.        Thank you for choosing [facility]       Thank you for choosing [facility] for your care. Our goal is always to provide you with excellent care. Hearing back from our patients is one way we can continue to improve our services. Please take a few minutes to complete the written survey that you may receive in the mail after you visit with us. Thank you!        [facility] Information     [employer] [employer] gives you secure access to your electronic health record. If you see a primary care provider, you can also send messages to your care team and make appointments. If you have questions, please call your primary care clinic.  If you do not have a primary care provider, please call [phone] and they will assist you.        Care EveryWhere ID     This is your Care EveryWhere ID. This could be used by other organizations to access your [facility] medical records  [id number]        Equal Access to Services     NESS LOVELL AH: Edward Ferreira, gerri em, qadebbie anderson, pancho weiss. So [facility] [phone].    ATENCIÓN: Si habla español, tiene a felipe disposición servicios gratuitos de asistencia lingüística. Llame al [phone].    We comply with applicable federal civil rights laws and [place] laws. We do not discriminate on the basis of race, color, national origin, age, disability, sex, sexual orientation, or gender identity.            After Visit Summary       This is your record. Keep this with you and show to your community pharmacist(s) and doctor(s) at your next visit.

## 2017-10-12 NOTE — ED AVS SNAPSHOT
Emergency Department    [address] [place] [address]    Phone:  [phone]    Fax:  [phone]                                       Andie Elkins   MRN: [id number]    Department:   Emergency Department   Date of Visit:  10/12/2017           After Visit Summary Signature Page     I have received my discharge instructions, and my questions have been answered. I have discussed any challenges I see with this plan with the nurse or doctor.    ..........................................................................................................................................  Patient/Patient Representative Signature      ..........................................................................................................................................  Patient Representative Print Name and Relationship to Patient    ..................................................               ................................................  Date                                            Time    ..........................................................................................................................................  Reviewed by Signature/Title    ...................................................              ..............................................  Date                                                            Time

## 2017-10-12 NOTE — DISCHARGE INSTRUCTIONS
Discharge Instructions  Influenza Like Illness    You were diagnosed today with influenza or influenza like illness.  Influenza is a respiratory (breathing) illness caused by influenza A or B viruses.  Influenza causes five primary symptoms: fever, headache, muscle aches/fatigue/malaise, sore throat and cough.  These symptoms start one to four days after you have been around a person with this illness. Influenza is spread through sneezing and coughing and can live on surfaces for several days.  It is usually contagious for 5 days but in some cases up to 10 days and often affects several family members. If you have a family member who is less than 2 years old, older than 65 years old, pregnant or has a serious medical condition, they should be seen right away by a provider to decide if they should take preventative medications. Although influenza will make you feel very ill, most patients don t require any specific treatment. An antiviral medication might be prescribed for certain groups of patients (older patients, younger patients, and those with certain chronic medical problems).    Generally, every Emergency Department visit should have a follow-up clinic visit with either a primary or a specialty clinic/provider. Please follow-up as instructed by your emergency provider today.    Return to the Emergency Department if:    You have trouble breathing.    You develop pain in your chest.    You have signs of being dehydrated, such as dizziness or unable to urinate (pee) at least three times daily.    You are confused or severely weak.    You cannot stop vomiting (throwing up) or you cannot drink enough fluids.    In children, you should seek help if the child has any of the above or if child:    Has blue or purplish skin color.    Is so irritable that he or she does not want to be held.    Does not have tears when crying (in infants) or does not urinate at least three times daily.    Does not wake up easily.    What  can I do to help myself?    Rest.    Fluids -- Drink hydrating solutions such as Gatorade  or Pedialyte  as often as you can. If you are drinking enough, you should pass urine at least every eight hours.    Tylenol  (acetaminophen) and Advil  (ibuprofen) can relieve fever, headache, and muscle aches. Do not give aspirin to children under 18 years old.     Antiviral treatment -- Antiviral medicines do not make the flu symptoms go away immediately.  They have only been shown to make the symptoms go away 12 to 24 hours sooner than they would without treatment.       Antibiotics -- Antibiotics are NOT useful for treating viral illnesses such as influenza. Antibiotics should only be used if there is a bacterial complication of the flu such as bacterial pneumonia, ear infection, or sinusitis.    Because you were diagnosed with a flu like illness you are very contagious.  This means you cannot work, attend school or  for at least 24 hours or until you no longer have a fever.  If you were given a prescription for medicine here today, be sure to read all of the information (including the package insert) that comes with your prescription.  This will include important information about the medicine, its side effects, and any warnings that you need to know about.  The pharmacist who fills the prescription can provide more information and answer questions you may have about the medicine.  If you have questions or concerns that the pharmacist cannot address, please call or return to the Emergency Department.   Remember that you can always come back to the Emergency Department if you are not able to see your regular provider in the amount of time listed above, if you get any new symptoms, or if there is anything that worries you.

## 2017-10-12 NOTE — ED PROVIDER NOTES
"  History     Chief Complaint:  Myalgias, cough    HPI   Andie Elkins is a 52 year old female who presents with myalgias. The patient reports a two week history of rhinorrhea, diffuse myalgias, sore throat, minimally productive cough, decreased appetite, and subjective chills. She tried some OTC cough/cold medicine without significant relief. Symptoms worsened yesterday. She also reports a four day history of diffuse lower abdominal pain which improves with ibuprofen. The patient denies any dysuria, hematuria, flank pain, nausea, vomiting, diarrhea, constipation, chest pain, dyspnea, fevers, recent known ill contacts, or any other acute symptoms.   She has received her influenza vaccination this year.      Allergies:  Codeine (\"knocks me out\")  Penicillins (rash)     Medications:    Depakote  Gabapentin  Clonazepam  Trazodone  Colace     Past Medical History:    Seizure   TIA  Hx suicidal ideation   GERD  Lumbago  Hyperlipidemia  Bipolar II disorder  Ca, breast   MI  Depression     Past Surgical History:    Endometrial surgery  Breast lumpectomy  Orthopedic surgery    Family History:    CAD (father with CABG x4, maternal grandfather)  MI (brother)  Ca, lung (father)  Ca, breast (maternal aunt)  Ca, throat/jaw (maternal uncle)  Ca, colorectal (maternal uncle)    Social History:  Former smoker, 12 packyear history, quit 1985. Occasional drinker.  Marital Status:  Legally  [3]      Review of Systems   Constitutional: Positive for appetite change (decrease) and chills. Negative for fever.   HENT: Positive for rhinorrhea and sore throat.    Respiratory: Positive for cough. Negative for shortness of breath.    Cardiovascular: Negative for chest pain.   Gastrointestinal: Positive for abdominal pain. Negative for diarrhea, nausea and vomiting.   Genitourinary: Negative.    Musculoskeletal: Positive for myalgias.   All other systems reviewed and are negative.      Physical Exam     Patient Vitals for the " "past 24 hrs:   BP Temp Temp src Heart Rate Resp SpO2 Height Weight   10/12/17 0915 118/69 98.9  F (37.2  C) Oral 89 18 100 % 1.626 m (5' 4\") 68 kg (150 lb)      Physical Exam  General: Alert, interactive in mild distress  Head:  Scalp is atraumatic  Eyes:  The pupils are equal, round, and reactive to light    EOM's intact    No scleral icterus  ENT:      Nose:  The external nose is normal  Ears:  External ears are normal  Mouth/Throat: The oropharynx is mildly erythematous    Mucus membranes are  dry      Neck:  Normal range of motion.      There is no rigidity.    Trachea is in the midline         CV:  Regular rate and rhythm    No murmur   Resp:  Breath sounds are clear bilaterally    Non-labored, no retractions or accessory muscle use      GI:  Abdomen is soft, no distension. Mild suprapubic tenderness.      MS:  Normal strength in all 4 extremities  Skin:  Warm and dry, No rash or lesions noted.  Neuro: Strength 5/5 x4.  Sensation intact  In all 4 extremities.         Psych:  Awake. Alert.  Normal affect.      Appropriate interactions.    Emergency Department Course   Imaging:  Radiographic findings were communicated with the patient who voiced understanding of the findings.  XR Chest 2 views: No acute cardiopulmonary abnormality. Results per Radiology.      Laboratory:  Laboratory findings were communicated with the patient who voiced understanding of the findings.  UA: Trace blood, 10 protein, small leukocyte esterase, few bacteria, 9 RBCs, 2 squams, mucous present, o/w Negative  Rapid Strep Test: negative   Strep Culture: Pending      Emergency Department Course:  Past medical records, nursing notes, and vitals reviewed.   0931: I performed an exam of the patient as documented above.   The above imaging study and labs were obtained.   1030: I rechecked patient.  Clinical findings and plan explained to the Patient. Patient discharged home with instructions regarding supportive care, medications, and reasons to " return as well as the importance of close follow-up were reviewed.       Impression & Plan    Medical Decision Making:  Andie Elkins is a 52 year old female who presents for evaluation of cough, fever and myalgias.  This is consistent with an influenza like illness. They are at risk for pneumonia but no signs of this are detected on today's visit, and chest X-ray is unremarkable.  Close followup of primary care physician is indicated and return to the ED for high fevers > 103 for more than 48 hours, increasing productive cough, shortness of breath, or confusion.  There are no signs of serious bacterial infection such as bacteremia, meningitis, UTI/pyelonephritis, strep pharyngitis, etc.     Diagnosis:    ICD-10-CM    1. Influenza-like illness R69        Disposition:  discharged to home    Discharge Medications:  New Prescriptions    BENZONATATE (TESSALON) 200 MG CAPSULE    Take 1 capsule (200 mg) by mouth 3 times daily as needed for cough       Efe WETZEL am serving as a scribe at 9:31 AM on 10/12/2017 to document services personally performed by Eddie Manrique MD based on my observations and the provider's statements to me.      Efe Rodriguez  10/12/2017    EMERGENCY DEPARTMENT       Eddie Manrique MD  10/12/17 6467

## 2017-10-14 LAB
BACTERIA SPEC CULT: NORMAL
Lab: NORMAL
SPECIMEN SOURCE: NORMAL

## 2017-11-09 ENCOUNTER — TRANSFERRED RECORDS (OUTPATIENT)
Dept: HEALTH INFORMATION MANAGEMENT | Facility: CLINIC | Age: 52
End: 2017-11-09

## 2018-01-02 ENCOUNTER — TELEPHONE (OUTPATIENT)
Dept: FAMILY MEDICINE | Facility: CLINIC | Age: 53
End: 2018-01-02

## 2018-01-02 DIAGNOSIS — Z12.11 COLON CANCER SCREENING: Primary | ICD-10-CM

## 2018-01-02 NOTE — TELEPHONE ENCOUNTER
Called the patient for Health Maintenance, agreed to FIT test screening. Order placed for provider to sign.     The patient request FIT be sent to home address:  [address]   [address]   [facility] [address]

## 2018-01-18 PROCEDURE — 82274 ASSAY TEST FOR BLOOD FECAL: CPT | Performed by: FAMILY MEDICINE

## 2018-01-21 LAB — HEMOCCULT STL QL IA: NEGATIVE

## 2018-01-22 DIAGNOSIS — Z12.11 COLON CANCER SCREENING: ICD-10-CM

## 2018-03-10 ENCOUNTER — HOSPITAL ENCOUNTER (EMERGENCY)
Facility: CLINIC | Age: 53
Discharge: HOME | End: 2018-03-10
Attending: NURSE PRACTITIONER | Admitting: NURSE PRACTITIONER
Payer: OTHER MISCELLANEOUS

## 2018-03-10 ENCOUNTER — APPOINTMENT (OUTPATIENT)
Dept: GENERAL RADIOLOGY | Facility: CLINIC | Age: 53
End: 2018-03-10
Attending: NURSE PRACTITIONER
Payer: OTHER MISCELLANEOUS

## 2018-03-10 VITALS
HEIGHT: 64 IN | RESPIRATION RATE: 16 BRPM | DIASTOLIC BLOOD PRESSURE: 83 MMHG | SYSTOLIC BLOOD PRESSURE: 126 MMHG | WEIGHT: 145 LBS | TEMPERATURE: 97.8 F | OXYGEN SATURATION: 97 % | BODY MASS INDEX: 24.75 KG/M2

## 2018-03-10 DIAGNOSIS — S63.502A SPRAIN OF LEFT WRIST, INITIAL ENCOUNTER: ICD-10-CM

## 2018-03-10 PROCEDURE — 99284 EMERGENCY DEPT VISIT MOD MDM: CPT

## 2018-03-10 PROCEDURE — 25000132 ZZH RX MED GY IP 250 OP 250 PS 637: Performed by: NURSE PRACTITIONER

## 2018-03-10 PROCEDURE — 73110 X-RAY EXAM OF WRIST: CPT | Mod: LT

## 2018-03-10 PROCEDURE — 29125 APPL SHORT ARM SPLINT STATIC: CPT | Mod: LT

## 2018-03-10 RX ORDER — IBUPROFEN 600 MG/1
600 TABLET, FILM COATED ORAL ONCE
Status: COMPLETED | OUTPATIENT
Start: 2018-03-10 | End: 2018-03-10

## 2018-03-10 RX ADMIN — IBUPROFEN 600 MG: 600 TABLET ORAL at 21:42

## 2018-03-10 ASSESSMENT — ENCOUNTER SYMPTOMS
NECK PAIN: 0
HEADACHES: 0
ARTHRALGIAS: 1
BACK PAIN: 0

## 2018-03-10 NOTE — ED AVS SNAPSHOT
Emergency Department    [address] [place] [address]    Phone:  [phone]    Fax:  [phone]                                       Andie Elkins   MRN: [id number]    Department:   Emergency Department   Date of Visit:  3/10/2018           After Visit Summary Signature Page     I have received my discharge instructions, and my questions have been answered. I have discussed any challenges I see with this plan with the nurse or doctor.    ..........................................................................................................................................  Patient/Patient Representative Signature      ..........................................................................................................................................  Patient Representative Print Name and Relationship to Patient    ..................................................               ................................................  Date                                            Time    ..........................................................................................................................................  Reviewed by Signature/Title    ...................................................              ..............................................  Date                                                            Time

## 2018-03-10 NOTE — ED AVS SNAPSHOT
Emergency Department    [address] [facility] [address]    Phone:  [phone]    Fax:  [phone]                                       Andie Elkins   MRN: [id number]    Department:   Emergency Department   Date of Visit:  3/10/2018           Patient Information     Date Of Birth          1965        Your diagnoses for this visit were:     Sprain of left wrist, initial encounter        You were seen by Raghu Owusu APRN CNP.      Follow-up Information     Follow up with Christina Izaguirre MD In 1 week.    Specialty:  Pediatrics    Why:  if continuned symptoms or sooner if worsening    Contact information:    [address] [place] [address] [address]  [place] [address]  [phone]          Discharge Instructions         Ibuprofen or Naproxen and/or Tylenol scheduled for the next 3-5 days. 600 mg (three tabs) ibuprofen, 440 mg (two tabs) Naproxen, 650-1000 mg of Tylenol. Follow directions.   Ice or heat to area.  I recommend ice in the first 24-48 hours.    Wrist Sprain  A sprain is an injury to the ligaments or capsule that holds a joint together. There are no broken bones. Most sprains take about 3 to 6 weeks to heal. If it a severe sprain where the ligament is completely torn, it can take months to recover.     Most wrist sprains are treated with a splint, wrist brace, or elastic wrap for support. Severe sprains may require surgery.  Home care    Keep your arm elevated to reduce pain and swelling. This is very important during the first 48 hours.    Apply an ice pack over the injured area for 15 to 20 minutes every 3 to 6 hours. You should do this for the first 24 to 48 hours. You can make an ice pack by filling a plastic bag that seals at the top with ice cubes and then wrapping it with a thin towel. Continue to use ice packs for relief of pain and swelling as needed. As the ice melts, be careful to avoid getting your wrap, splint, or cast wet. After 48 hours, apply heat (warm  shower or warm bath) for 15 to 20 minutes several times a day, or alternate ice and heat.     You may use over-the-counter pain medicine to control pain, unless another pain medicine was prescribed. If you have chronic liver or kidney disease or ever had a stomach ulcer or GI bleeding, talk with your doctor before using these medicines.    If you were given a splint or brace, wear it for the time advised by your doctor.  Follow-up care  Follow up with your healthcare provider as advised. Any X-rays you had today don t show any broken bones, breaks, or fractures. Sometimes fractures don t show up on the first X-ray. Bruises and sprains can sometimes hurt as much as a fracture. These injuries can take time to heal completely. If your symptoms don t improve or they get worse, talk with your doctor. You may need a repeat X-ray. If X-rays were taken, you will be told of any new findings that may affect your care.  When to seek medical advice  Call your healthcare provider right away if any of these occur:    Pain or swelling increases    Fingers or hand becomes cold, blue, numb, or tingly  Date Last Reviewed: 11/20/2015 2000-2017 [employer]. [address] [place] [address]. All rights reserved. This information is not intended as a substitute for professional medical care. Always follow your healthcare professional's instructions.          24 Hour Appointment Hotline       To make an appointment at any [facility], call [phone] ([phone]). If you don't have a family doctor or clinic, we will help you find one. [facility] are conveniently located to serve the needs of you and your family.             Review of your medicines      Our records show that you are taking the medicines listed below. If these are incorrect, please call your family doctor or clinic.        Dose / Directions Last dose taken    benzonatate 200 MG capsule   Commonly known as:  TESSALON   Dose:  200  mg   Quantity:  21 capsule        Take 1 capsule (200 mg) by mouth 3 times daily as needed for cough   Refills:  0        CLONAZEPAM PO        Refills:  0        DEPAKOTE PO   Dose:  500 mg        Take 500 mg by mouth daily   Refills:  0        docusate sodium 100 MG capsule   Commonly known as:  COLACE   Dose:  100 mg        Take 1 capsule (100 mg) by mouth At Bedtime   Refills:  0        GABAPENTIN PO   Dose:  1200 mg        Take 1,200 mg by mouth At Bedtime   Refills:  0        traZODone 100 MG tablet   Commonly known as:  DESYREL   Dose:  200 mg        Take 2 tablets (200 mg) by mouth At Bedtime   Refills:  0        VENLAFAXINE HCL PO        Refills:  0                Procedures and tests performed during your visit     Wrist XR, G/E 3 views, left      Orders Needing Specimen Collection     None      Pending Results     No orders found from 3/8/2018 to 3/11/2018.            Pending Culture Results     No orders found from 3/8/2018 to 3/11/2018.            Pending Results Instructions     If you had any lab results that were not finalized at the time of your Discharge, you can call the ED Lab Result RN at [phone]. You will be contacted by this team for any positive Lab results or changes in treatment. The nurses are available 7 days a week from 10A to 6:30P.  You can leave a message 24 hours per day and they will return your call.        Test Results From Your Hospital Stay        3/10/2018  9:57 PM      Narrative     WRIST LEFT THREE OR MORE VIEWS   3/10/2018 9:48 PM     HISTORY: Fall, pain.     COMPARISON: None.    FINDINGS: Surgical screw in the left carponavicular bone. Degenerative  changes at the radiocarpal articulation and chondrocalcinosis in the  left wrist. No fracture or acute appearing abnormality.        Impression     IMPRESSION: Degenerative and postoperative changes left wrist. No  acute appearing abnormality.    BEATRICE PARSONS MD                Clinical Quality Measure: Blood Pressure  Screening     Your blood pressure was checked while you were in the emergency department today. The last reading we obtained was  BP: 126/83 . Please read the guidelines below about what these numbers mean and what you should do about them.  If your systolic blood pressure (the top number) is less than 120 and your diastolic blood pressure (the bottom number) is less than 80, then your blood pressure is normal. There is nothing more that you need to do about it.  If your systolic blood pressure (the top number) is 120-139 or your diastolic blood pressure (the bottom number) is 80-89, your blood pressure may be higher than it should be. You should have your blood pressure rechecked within a year by a primary care provider.  If your systolic blood pressure (the top number) is 140 or greater or your diastolic blood pressure (the bottom number) is 90 or greater, you may have high blood pressure. High blood pressure is treatable, but if left untreated over time it can put you at risk for heart attack, stroke, or kidney failure. You should have your blood pressure rechecked by a primary care provider within the next 4 weeks.  If your provider in the emergency department today gave you specific instructions to follow-up with your doctor or provider even sooner than that, you should follow that instruction and not wait for up to 4 weeks for your follow-up visit.        Thank you for choosing [facility]       Thank you for choosing [facility] for your care. Our goal is always to provide you with excellent care. Hearing back from our patients is one way we can continue to improve our services. Please take a few minutes to complete the written survey that you may receive in the mail after you visit with us. Thank you!        [employer] Information     [email or web address] gives you secure access to your electronic health record. If you see a primary care provider, you can also send messages to your care team and make appointments. If you have  questions, please call your primary care clinic.  If you do not have a primary care provider, please call [phone] and they will assist you.        Care EveryWhere ID     This is your Care EveryWhere ID. This could be used by other organizations to access your [facility] medical records  [id number]        Equal Access to Services     NESS LOVELL : Edward Ferreira, wapoli em, red taoalmary anderson, pancho weiss. So [facility] [phone].    ATENCIÓN: Si habla español, tiene a felipe disposición servicios gratuitos de asistencia lingüística. Llame al [phone].    We comply with applicable federal civil rights laws and [place] laws. We do not discriminate on the basis of race, color, national origin, age, disability, sex, sexual orientation, or gender identity.            After Visit Summary       This is your record. Keep this with you and show to your community pharmacist(s) and doctor(s) at your next visit.

## 2018-03-11 NOTE — ED PROVIDER NOTES
"  History     Chief Complaint:  Wrist Pain    HPI   Andie Elkins is a 52 year old female who presents to the emergency department today for evaluation of left wrist pain. The patient reports she was walking over a hose on her way to the back of the kitchen when one of the coworkers yanked it, causing her to trip on the hose and land on her hands. She reports since the fall she developed left wrist pain. The patient reports history of decreased range of motion in her left wrist with reconstruction surgery and a screw still in place. During the fall, her wrist extended further than she was able to move it prior to the fall. The patient has not taken ibuprofen prior to arrival. She denies left arm pain.    Allergies:  Codeine  Penicillins    Medications:    Venlafaxine     Tessalon   Depakote  Clonazepam   Gabapentin   Trazodone  Colace     Past Medical History:    Breast cancer   Depressive disorder   Myocardial infarction     Past Surgical History:    Lower arm surgery, reconstruction   Gyn surgery   Lumpectomy breast  Orthopedic surgery     Family History:    Lipids  Hypertension   Cerebrovascular Disease   Diabetes   Depression   Heart disease  CAD  Lung cancer   Alcohol/drug   Dementia   Breast cancer  Epilepsy   DVT  Cancer    Social History:  This is a work-related injury. The patient works at [employer].     Review of Systems   Constitutional:        Mechanical fall at work   Musculoskeletal: Positive for arthralgias (left wrist). Negative for back pain and neck pain.   Neurological: Negative for headaches.   All other systems reviewed and are negative.    Physical Exam     Patient Vitals for the past 24 hrs:   BP Temp Temp src Heart Rate Resp SpO2 Height Weight   03/10/18 2117 126/83 97.8  F (36.6  C) Oral 68 16 97 % 1.626 m (5' 4\") 65.8 kg (145 lb)     Physical Exam  General: Alert, moderate discomfort, well kept   HENT:  Normal voice, No lymphadenopathy  Eyes:  The pupils are equal, round, and " reactive to light, Conjunctiva normal, No scleral icterus   Neck:  Normal range of motion  CV:  Normal Pulses  Resp:  Non-labored, No cough  MS:  Normal muscular tone, moves all extremities, Left wrist with exquisite tenderness to mid wrist, no obvious deformity and good distal circulation and sensation. Decreased range of motion. History of decreased range of motion with reconstructive surgery in the past.   Skin:  No rash or acute skin lesions noted  Neuro: Speech is normal and fluent  Psych:  Awake. Alert.  Normal affect.  Appropriate interactions. Good eye contact.     Emergency Department Course     Imaging:  Radiology findings were communicated with the patient who voiced understanding of the findings.    Wrist XR, G/E 3 views, left  Degenerative and postoperative changes left wrist. No acute appearing abnormality.  BEATRICE PARSONS MD  Reading per radiology    Interventions:  2142 Ibuprofen 600 mg PO    Emergency Department Course:    2117 Nursing notes and vitals reviewed.    2127 I performed an exam of the patient as documented above.     2143 The patient was sent for a Wrist XR, G/E 3 views, left while in the emergency department, results above.     2201 I personally reviewed the imaging results with the patient and answered all related questions prior to discharge. I discussed the treatment plan with the patient. She expressed understanding of this plan and consented to discharge. She will be discharged home with instructions for care and follow up. In addition, the patient will return to the emergency department if their symptoms persist, worsen, if new symptoms arise or if there is any concern.  All questions were answered.    Impression & Plan      Medical Decision Making:  Andie Elkins is a 52 year old female who presented for injury after falling on Left wrist concerned due to discomfort she presented for evaluation. Xray obtained and is negative for fracture, dislocation, or malalignment.  Appears to be a simple sprain/strain type injury. No neurovascular compromise. Wrist splint given. Ibuprofen and Ice for discomfort.  Follow up with PCP in 5-7 days if continued or worsening pain.  Immediate return to the ED if develops numbness, weakness, tingling, discoloration, or for other concerns      Diagnosis:    ICD-10-CM    1. Sprain of left wrist, initial encounter S63.502A      Disposition:   The patient is discharged to home.    Scribe Disclosure:  Harrison WETZEL, am serving as a scribe at 9:20 PM on 3/10/2018 to document services personally performed by Raghu Owusu APRN based on my observations and the provider's statements to me.     EMERGENCY DEPARTMENT       Raghu Owusu APRN CNP  03/10/18 2508

## 2018-03-11 NOTE — DISCHARGE INSTRUCTIONS
Ibuprofen or Naproxen and/or Tylenol scheduled for the next 3-5 days. 600 mg (three tabs) ibuprofen, 440 mg (two tabs) Naproxen, 650-1000 mg of Tylenol. Follow directions.   Ice or heat to area.  I recommend ice in the first 24-48 hours.    Wrist Sprain  A sprain is an injury to the ligaments or capsule that holds a joint together. There are no broken bones. Most sprains take about 3 to 6 weeks to heal. If it a severe sprain where the ligament is completely torn, it can take months to recover.     Most wrist sprains are treated with a splint, wrist brace, or elastic wrap for support. Severe sprains may require surgery.  Home care    Keep your arm elevated to reduce pain and swelling. This is very important during the first 48 hours.    Apply an ice pack over the injured area for 15 to 20 minutes every 3 to 6 hours. You should do this for the first 24 to 48 hours. You can make an ice pack by filling a plastic bag that seals at the top with ice cubes and then wrapping it with a thin towel. Continue to use ice packs for relief of pain and swelling as needed. As the ice melts, be careful to avoid getting your wrap, splint, or cast wet. After 48 hours, apply heat (warm shower or warm bath) for 15 to 20 minutes several times a day, or alternate ice and heat.     You may use over-the-counter pain medicine to control pain, unless another pain medicine was prescribed. If you have chronic liver or kidney disease or ever had a stomach ulcer or GI bleeding, talk with your doctor before using these medicines.    If you were given a splint or brace, wear it for the time advised by your doctor.  Follow-up care  Follow up with your healthcare provider as advised. Any X-rays you had today don t show any broken bones, breaks, or fractures. Sometimes fractures don t show up on the first X-ray. Bruises and sprains can sometimes hurt as much as a fracture. These injuries can take time to heal completely. If your symptoms don t improve  or they get worse, talk with your doctor. You may need a repeat X-ray. If X-rays were taken, you will be told of any new findings that may affect your care.  When to seek medical advice  Call your healthcare provider right away if any of these occur:    Pain or swelling increases    Fingers or hand becomes cold, blue, numb, or tingly  Date Last Reviewed: 11/20/2015 2000-2017 [employer]. [address] [place] [address], [place] [address]. All rights reserved. This information is not intended as a substitute for professional medical care. Always follow your healthcare professional's instructions.

## 2018-03-12 ENCOUNTER — HOSPITAL ENCOUNTER (EMERGENCY)
Facility: CLINIC | Age: 53
Discharge: HOME | End: 2018-03-12
Attending: EMERGENCY MEDICINE | Admitting: EMERGENCY MEDICINE
Payer: MEDICAID

## 2018-03-12 ENCOUNTER — APPOINTMENT (OUTPATIENT)
Dept: GENERAL RADIOLOGY | Facility: CLINIC | Age: 53
End: 2018-03-12
Attending: EMERGENCY MEDICINE
Payer: MEDICAID

## 2018-03-12 VITALS
DIASTOLIC BLOOD PRESSURE: 82 MMHG | BODY MASS INDEX: 24.59 KG/M2 | WEIGHT: 144 LBS | SYSTOLIC BLOOD PRESSURE: 119 MMHG | OXYGEN SATURATION: 97 % | HEIGHT: 64 IN | TEMPERATURE: 97.9 F | RESPIRATION RATE: 27 BRPM

## 2018-03-12 DIAGNOSIS — Z91.148 NONCOMPLIANCE WITH MEDICATION REGIMEN: ICD-10-CM

## 2018-03-12 DIAGNOSIS — R51.9 ACUTE NONINTRACTABLE HEADACHE, UNSPECIFIED HEADACHE TYPE: ICD-10-CM

## 2018-03-12 DIAGNOSIS — F44.5 PSEUDOSEIZURE: ICD-10-CM

## 2018-03-12 LAB
ANION GAP SERPL CALCULATED.3IONS-SCNC: 6 MMOL/L (ref 3–14)
BASOPHILS # BLD AUTO: 0 10E9/L (ref 0–0.2)
BASOPHILS NFR BLD AUTO: 0.7 %
BUN SERPL-MCNC: 15 MG/DL (ref 7–30)
CALCIUM SERPL-MCNC: 8.4 MG/DL (ref 8.5–10.1)
CHLORIDE SERPL-SCNC: 109 MMOL/L (ref 94–109)
CO2 SERPL-SCNC: 26 MMOL/L (ref 20–32)
CREAT SERPL-MCNC: 0.64 MG/DL (ref 0.52–1.04)
D DIMER PPP FEU-MCNC: 0.4 UG/ML FEU (ref 0–0.5)
DIFFERENTIAL METHOD BLD: ABNORMAL
EOSINOPHIL # BLD AUTO: 0.1 10E9/L (ref 0–0.7)
EOSINOPHIL NFR BLD AUTO: 1.7 %
ERYTHROCYTE [DISTWIDTH] IN BLOOD BY AUTOMATED COUNT: 12 % (ref 10–15)
GFR SERPL CREATININE-BSD FRML MDRD: >90 ML/MIN/1.7M2
GLUCOSE SERPL-MCNC: 79 MG/DL (ref 70–99)
HCT VFR BLD AUTO: 34 % (ref 35–47)
HGB BLD-MCNC: 11.6 G/DL (ref 11.7–15.7)
IMM GRANULOCYTES # BLD: 0 10E9/L (ref 0–0.4)
IMM GRANULOCYTES NFR BLD: 0.2 %
INTERPRETATION ECG - MUSE: NORMAL
LYMPHOCYTES # BLD AUTO: 1.4 10E9/L (ref 0.8–5.3)
LYMPHOCYTES NFR BLD AUTO: 34.1 %
MCH RBC QN AUTO: 32 PG (ref 26.5–33)
MCHC RBC AUTO-ENTMCNC: 34.1 G/DL (ref 31.5–36.5)
MCV RBC AUTO: 94 FL (ref 78–100)
MONOCYTES # BLD AUTO: 0.3 10E9/L (ref 0–1.3)
MONOCYTES NFR BLD AUTO: 7.5 %
NEUTROPHILS # BLD AUTO: 2.2 10E9/L (ref 1.6–8.3)
NEUTROPHILS NFR BLD AUTO: 55.8 %
NRBC # BLD AUTO: 0 10*3/UL
NRBC BLD AUTO-RTO: 0 /100
PLATELET # BLD AUTO: 236 10E9/L (ref 150–450)
POTASSIUM SERPL-SCNC: 3.5 MMOL/L (ref 3.4–5.3)
RBC # BLD AUTO: 3.63 10E12/L (ref 3.8–5.2)
SODIUM SERPL-SCNC: 141 MMOL/L (ref 133–144)
TROPONIN I SERPL-MCNC: <0.015 UG/L (ref 0–0.04)
VALPROATE SERPL-MCNC: 3 MG/L (ref 50–100)
WBC # BLD AUTO: 4 10E9/L (ref 4–11)

## 2018-03-12 PROCEDURE — 93005 ELECTROCARDIOGRAM TRACING: CPT

## 2018-03-12 PROCEDURE — 99285 EMERGENCY DEPT VISIT HI MDM: CPT | Mod: 25

## 2018-03-12 PROCEDURE — 25000132 ZZH RX MED GY IP 250 OP 250 PS 637: Performed by: EMERGENCY MEDICINE

## 2018-03-12 PROCEDURE — 25000128 H RX IP 250 OP 636: Performed by: EMERGENCY MEDICINE

## 2018-03-12 PROCEDURE — 80164 ASSAY DIPROPYLACETIC ACD TOT: CPT | Performed by: EMERGENCY MEDICINE

## 2018-03-12 PROCEDURE — 84484 ASSAY OF TROPONIN QUANT: CPT | Performed by: EMERGENCY MEDICINE

## 2018-03-12 PROCEDURE — 80048 BASIC METABOLIC PNL TOTAL CA: CPT | Performed by: EMERGENCY MEDICINE

## 2018-03-12 PROCEDURE — 85379 FIBRIN DEGRADATION QUANT: CPT | Performed by: EMERGENCY MEDICINE

## 2018-03-12 PROCEDURE — 96360 HYDRATION IV INFUSION INIT: CPT

## 2018-03-12 PROCEDURE — 71046 X-RAY EXAM CHEST 2 VIEWS: CPT

## 2018-03-12 PROCEDURE — 85025 COMPLETE CBC W/AUTO DIFF WBC: CPT | Performed by: EMERGENCY MEDICINE

## 2018-03-12 PROCEDURE — 96361 HYDRATE IV INFUSION ADD-ON: CPT

## 2018-03-12 RX ORDER — IBUPROFEN 600 MG/1
600 TABLET, FILM COATED ORAL ONCE
Status: COMPLETED | OUTPATIENT
Start: 2018-03-12 | End: 2018-03-12

## 2018-03-12 RX ORDER — ACETAMINOPHEN 325 MG/1
650 TABLET ORAL ONCE
Status: DISCONTINUED | OUTPATIENT
Start: 2018-03-12 | End: 2018-03-12

## 2018-03-12 RX ADMIN — IBUPROFEN 600 MG: 600 TABLET ORAL at 18:57

## 2018-03-12 RX ADMIN — SODIUM CHLORIDE 1000 ML: 9 INJECTION, SOLUTION INTRAVENOUS at 18:42

## 2018-03-12 ASSESSMENT — ENCOUNTER SYMPTOMS
SEIZURES: 0
HEADACHES: 1
SHORTNESS OF BREATH: 1
CARDIOVASCULAR NEGATIVE: 1

## 2018-03-12 NOTE — ED NOTES
Bed: ED24  Expected date: 3/12/18  Expected time: 5:47 PM  Means of arrival: Ambulance  Comments:  413  seizure

## 2018-03-12 NOTE — ED AVS SNAPSHOT
Emergency Department    [address] [place] [address]    Phone:  [phone]    Fax:  [phone]                                       Andie Elkins   MRN: [id number]    Department:   Emergency Department   Date of Visit:  3/12/2018           After Visit Summary Signature Page     I have received my discharge instructions, and my questions have been answered. I have discussed any challenges I see with this plan with the nurse or doctor.    ..........................................................................................................................................  Patient/Patient Representative Signature      ..........................................................................................................................................  Patient Representative Print Name and Relationship to Patient    ..................................................               ................................................  Date                                            Time    ..........................................................................................................................................  Reviewed by Signature/Title    ...................................................              ..............................................  Date                                                            Time

## 2018-03-12 NOTE — LETTER
March 12, 2018      To Whom It May Concern:      Andie Elkins was seen in our Emergency Department today, 03/12/18.  I expect her condition to improve over the next 2 days.  She may return to work/school when improved.    Sincerely,        Ariana Rubio MD
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
## 2018-03-12 NOTE — ED AVS SNAPSHOT
Emergency Department    [address] [facility] [address]    Phone:  [phone]    Fax:  [phone]                                       Andie Elkins   MRN: [id number]    Department:   Emergency Department   Date of Visit:  3/12/2018           Patient Information     Date Of Birth          1965        Your diagnoses for this visit were:     Pseudoseizure     Noncompliance with medication regimen     Acute nonintractable headache, unspecified headache type        You were seen by Ariana Rubio MD.      Follow-up Information     Follow up with Christina Izaguirre MD In 3 days.    Specialty:  Pediatrics    Contact information:    [address] [place] [address] DR  [place] [address] [phone]          Follow up with Your psychiatrist. Call in 1 day.        Follow up with  Emergency Department.    Specialty:  EMERGENCY MEDICINE    Why:  If symptoms worsen    Contact information:    [address] [facility] [address] [phone]        Discharge Instructions       Call your psychiatrist to get medication refills.   Take a couple days off work to rest and get your medications.  Return with new or concerning symptoms.     Discharge References/Attachments     (S) REDUCING STRESS ON THE GO (ENGLISH)    HEADACHE, UNSPECIFIED (ENGLISH)      24 Hour Appointment Hotline       To make an appointment at any [facility], call [phone] ([phone]). If you don't have a family doctor or clinic, we will help you find one. [facility] are conveniently located to serve the needs of you and your family.             Review of your medicines      Our records show that you are taking the medicines listed below. If these are incorrect, please call your family doctor or clinic.        Dose / Directions Last dose taken    benzonatate 200 MG capsule   Commonly known as:  TESSALON   Dose:  200 mg   Quantity:  21 capsule        Take 1 capsule (200 mg) by mouth 3 times  daily as needed for cough   Refills:  0        CLONAZEPAM PO        Refills:  0        DEPAKOTE PO   Dose:  500 mg        Take 500 mg by mouth daily   Refills:  0        docusate sodium 100 MG capsule   Commonly known as:  COLACE   Dose:  100 mg        Take 1 capsule (100 mg) by mouth At Bedtime   Refills:  0        GABAPENTIN PO   Dose:  1200 mg        Take 1,200 mg by mouth At Bedtime   Refills:  0        traZODone 100 MG tablet   Commonly known as:  DESYREL   Dose:  200 mg        Take 2 tablets (200 mg) by mouth At Bedtime   Refills:  0        VENLAFAXINE HCL PO        Refills:  0                Procedures and tests performed during your visit     Basic metabolic panel    CBC with platelets differential    D dimer quantitative    EKG 12-lead, tracing only    Troponin I    Valproic acid (Depakote level)    XR Chest 2 Views      Orders Needing Specimen Collection     None      Pending Results     No orders found from 3/10/2018 to 3/13/2018.            Pending Culture Results     No orders found from 3/10/2018 to 3/13/2018.            Pending Results Instructions     If you had any lab results that were not finalized at the time of your Discharge, you can call the ED Lab Result RN at [phone]. You will be contacted by this team for any positive Lab results or changes in treatment. The nurses are available 7 days a week from 10A to 6:30P.  You can leave a message 24 hours per day and they will return your call.        Test Results From Your Hospital Stay        3/12/2018  6:31 PM      Narrative     CHEST TWO VIEWS   3/12/2018 6:24 PM     HISTORY: Dyspnea.     COMPARISON: 10/12/2017.    FINDINGS: Negative. No interval change.        Impression     IMPRESSION: Negative.    BEATRICE PARSONS MD         3/12/2018  6:45 PM      Component Results     Component Value Ref Range & Units Status    Sodium 141 133 - 144 mmol/L Final    Potassium 3.5 3.4 - 5.3 mmol/L Final    Chloride 109 94 - 109 mmol/L Final    Carbon  Dioxide 26 20 - 32 mmol/L Final    Anion Gap 6 3 - 14 mmol/L Final    Glucose 79 70 - 99 mg/dL Final    Urea Nitrogen 15 7 - 30 mg/dL Final    Creatinine 0.64 0.52 - 1.04 mg/dL Final    GFR Estimate >90 >60 mL/min/1.7m2 Final    Non  GFR Calc    GFR Estimate If Black >90 >60 mL/min/1.7m2 Final    African American GFR Calc    Calcium 8.4 (L) 8.5 - 10.1 mg/dL Final         3/12/2018  6:46 PM      Component Results     Component Value Ref Range & Units Status    D Dimer 0.4 0.0 - 0.50 ug/ml FEU Final    This D-dimer assay is intended for use in conjunction with a clinical pretest   probability assessment model to exclude pulmonary embolism (PE) and deep   venous thrombosis (DVT) in outpatients suspected of PE or DVT. The cut-off   value is 0.5 ug/mL FEU.           3/12/2018  6:32 PM      Component Results     Component Value Ref Range & Units Status    WBC 4.0 4.0 - 11.0 10e9/L Final    RBC Count 3.63 (L) 3.8 - 5.2 10e12/L Final    Hemoglobin 11.6 (L) 11.7 - 15.7 g/dL Final    Hematocrit 34.0 (L) 35.0 - 47.0 % Final    MCV 94 78 - 100 fl Final    MCH 32.0 26.5 - 33.0 pg Final    MCHC 34.1 31.5 - 36.5 g/dL Final    RDW 12.0 10.0 - 15.0 % Final    Platelet Count 236 150 - 450 10e9/L Final    Diff Method Automated Method  Final    % Neutrophils 55.8 % Final    % Lymphocytes 34.1 % Final    % Monocytes 7.5 % Final    % Eosinophils 1.7 % Final    % Basophils 0.7 % Final    % Immature Granulocytes 0.2 % Final    Nucleated RBCs 0 0 /100 Final    Absolute Neutrophil 2.2 1.6 - 8.3 10e9/L Final    Absolute Lymphocytes 1.4 0.8 - 5.3 10e9/L Final    Absolute Monocytes 0.3 0.0 - 1.3 10e9/L Final    Absolute Eosinophils 0.1 0.0 - 0.7 10e9/L Final    Absolute Basophils 0.0 0.0 - 0.2 10e9/L Final    Abs Immature Granulocytes 0.0 0 - 0.4 10e9/L Final    Absolute Nucleated RBC 0.0  Final         3/12/2018  6:49 PM      Component Results     Component Value Ref Range & Units Status    Troponin I ES <0.015 0.000 - 0.045  ug/L Final    The 99th percentile for upper reference range is 0.045 ug/L.  Troponin values   in the range of 0.045 - 0.120 ug/L may be associated with risks of adverse   clinical events.           3/12/2018  7:02 PM      Component Results     Component Value Ref Range & Units Status    Valproic Acid Level 3 (L) 50 - 100 mg/L Final                Clinical Quality Measure: Blood Pressure Screening     Your blood pressure was checked while you were in the emergency department today. The last reading we obtained was  BP: 119/82 . Please read the guidelines below about what these numbers mean and what you should do about them.  If your systolic blood pressure (the top number) is less than 120 and your diastolic blood pressure (the bottom number) is less than 80, then your blood pressure is normal. There is nothing more that you need to do about it.  If your systolic blood pressure (the top number) is 120-139 or your diastolic blood pressure (the bottom number) is 80-89, your blood pressure may be higher than it should be. You should have your blood pressure rechecked within a year by a primary care provider.  If your systolic blood pressure (the top number) is 140 or greater or your diastolic blood pressure (the bottom number) is 90 or greater, you may have high blood pressure. High blood pressure is treatable, but if left untreated over time it can put you at risk for heart attack, stroke, or kidney failure. You should have your blood pressure rechecked by a primary care provider within the next 4 weeks.  If your provider in the emergency department today gave you specific instructions to follow-up with your doctor or provider even sooner than that, you should follow that instruction and not wait for up to 4 weeks for your follow-up visit.        Thank you for choosing [facility]       Thank you for choosing [facility] for your care. Our goal is always to provide you with excellent care. Hearing back from our patients is  one way we can continue to improve our services. Please take a few minutes to complete the written survey that you may receive in the mail after you visit with us. Thank you!        [employer] Information     [email or web address] gives you secure access to your electronic health record. If you see a primary care provider, you can also send messages to your care team and make appointments. If you have questions, please call your primary care clinic.  If you do not have a primary care provider, please call [phone] and they will assist you.        Care EveryWhere ID     This is your Care EveryWhere ID. This could be used by other organizations to access your [facility] medical records  [id number]        Equal Access to Services     NESS LOVELL : Edward Ferreira, gerri em, red anderson, pancho weiss. So [facility] [phone].    ATENCIÓN: Si habla español, tiene a felipe disposición servicios gratuitos de asistencia lingüística. Llame al [phone].    We comply with applicable federal civil rights laws and [place] laws. We do not discriminate on the basis of race, color, national origin, age, disability, sex, sexual orientation, or gender identity.            After Visit Summary       This is your record. Keep this with you and show to your community pharmacist(s) and doctor(s) at your next visit.
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
## 2018-03-13 ENCOUNTER — CARE COORDINATION (OUTPATIENT)
Dept: CARE COORDINATION | Facility: CLINIC | Age: 53
End: 2018-03-13

## 2018-03-13 NOTE — PROGRESS NOTES
Clinic Care Coordination Contact    OUTREACH    Referral Information:  Referral Source: ED Follow-Up    Primary Diagnosis: Behavioral Health    Chief Complaint   Patient presents with     Clinic Care Coordination - [facility]     2 ED F/U SW        [facility] Utilization:   Utilization    Last refreshed: 3/13/2018  3:16 AM:  No Show Count (past year) 1       Last refreshed: 3/13/2018  3:16 AM:  ED visits 7       Last refreshed: 3/13/2018  3:16 AM:  Hospital admissions 1          Current as of: 3/13/2018  3:16 AM             Clinical Concerns:  Current Medical Concerns:  pseudoseizures    Current Behavioral Concerns: depression/anxiety    Education Provided to patient: How to use [facility] Prescription Assistance Program   Clinical Pathway Name: None  Health Maintenance Reviewed: Due/Overdue     Medication Management:  self      Functional Status:  Mobility Status: Independent  Equipment Currently Used at Home: none  Transportation:        Psychosocial:  Current living arrangement:: Other (staying w/others (out of her home))  Financial/Insurance: has a job with benefits     Resources and Interventions:  Current Resources:  ;    Advanced Care Plans/Directives on file:: No  Referrals Placed:  ([facility] Prescr Assist Program)     Plan: DOMI KELLER mailing updated 24 Hour Access Care Plan along with [facility] Prescription Assistance Phone number for Andie to call to get an application. DOMI KELLER follow up out reach in 10-14 business days.    Grecia Fontanez [facility]  Clinic Care Coordinator-  Clinics: [place], [place], [place]  E-Mail: [email or web address]  Telephone: [phone]

## 2018-03-13 NOTE — DISCHARGE INSTRUCTIONS
Call your psychiatrist to get medication refills.   Take a couple days off work to rest and get your medications.  Return with new or concerning symptoms.

## 2018-03-13 NOTE — LETTER
Health Care Home - Access Care Plan    About Me  Patient Name:  Andie Elkins    YOB: 1965  Age:                             52 year old   [place] MRN:            [id number] Telephone Information:     Home Phone [phone]   Mobile [phone]       Address:    [address]  [facility] [address] Email address:  [email or web address]      Emergency Contact(s)  Name Relationship Lgl Grd Work Phone Home Phone Mobile Phone   1. NONE PER PT 9/* Other   [phone]              Health Maintenance: Routine Health maintenance Reviewed: Due/Overdue     My Access Plan  Medical Emergency 911   Questions or concerns during clinic hours Primary Clinic Line, I will call the clinic directly: [facility] Nicole Sigalae - [phone]   24 Hour Appointment Line [phone] or  [phone]) (toll free)   24 Hour Nurse Line [phone] (toll free)   Questions or concerns outside clinic hours 24 Hour Appointment Line, I will call the after-hours on-call line:   [facility] [phone] or [phone] ([phone]) (toll-free)   Preferred Urgent Care Other   Preferred Hospital [facility]  [phone]   Preferred Pharmacy [facility] - [place], [place] - [address] [facility]     Behavioral Health Crisis Line The National Suicide Prevention Lifeline at 1-668.442.9700 or 717     My Care Team Members  Patient Care Team       Relationship Specialty Notifications Start End    Christina Izaguirre MD PCP - General Pediatrics  12/12/16     Phone: [phone] Fax: [phone]         [address] [place] [address]    Mesha Krishnan PsyD Psychologist Psychology Admissions 12/12/13     Phone: [phone] Fax: [phone]         [address] [place] [address]    Bridgette Fontanez LSW Clinic Care Coordinator Primary Care - CC Admissions 3/13/18     Phone: [phone] Fax: [phone]                  Medical and Care Information  Problem List   Patient Active Problem List   Diagnosis     CARDIOVASCULAR SCREENING; LDL GOAL LESS THAN 160     Lumbago     Hyperlipidemia LDL goal <160     Left shoulder pain     Bipolar II disorder, most recent episode major depressive (H)     Major depressive disorder, recurrent episode, mild (H)     Gastroesophageal reflux disease, esophagitis presence not specified     Nausea     Personal history of malignant neoplasm of breast     Suicidal ideation     TIA (transient ischemic attack)     Seizure (H)      Current Medications and Allergies:  See printed Medication Report    [place] Prescription Assistance Program  [phone]    Call to request an application; these ladies are very nice and understanding.  They will do whatever they can to help you.    Best wishes, Grecia [phone]

## 2018-03-19 ENCOUNTER — CARE COORDINATION (OUTPATIENT)
Dept: CARE COORDINATION | Facility: CLINIC | Age: 53
End: 2018-03-19
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
## 2018-05-13 ENCOUNTER — HOSPITAL ENCOUNTER (EMERGENCY)
Facility: CLINIC | Age: 53
Discharge: HOME | End: 2018-05-13
Attending: EMERGENCY MEDICINE | Admitting: EMERGENCY MEDICINE
Payer: COMMERCIAL

## 2018-05-13 VITALS
DIASTOLIC BLOOD PRESSURE: 66 MMHG | WEIGHT: 145 LBS | SYSTOLIC BLOOD PRESSURE: 118 MMHG | OXYGEN SATURATION: 100 % | RESPIRATION RATE: 18 BRPM | BODY MASS INDEX: 24.75 KG/M2 | TEMPERATURE: 97.7 F | HEIGHT: 64 IN

## 2018-05-13 DIAGNOSIS — F44.5 PSEUDOSEIZURE: ICD-10-CM

## 2018-05-13 DIAGNOSIS — F43.0 ACUTE REACTION TO STRESS: ICD-10-CM

## 2018-05-13 PROCEDURE — 90791 PSYCH DIAGNOSTIC EVALUATION: CPT

## 2018-05-13 PROCEDURE — 25000132 ZZH RX MED GY IP 250 OP 250 PS 637: Performed by: EMERGENCY MEDICINE

## 2018-05-13 PROCEDURE — 99285 EMERGENCY DEPT VISIT HI MDM: CPT | Mod: 25

## 2018-05-13 RX ORDER — VALPROIC ACID 250 MG/1
500 CAPSULE, LIQUID FILLED ORAL ONCE
Status: COMPLETED | OUTPATIENT
Start: 2018-05-13 | End: 2018-05-13

## 2018-05-13 RX ADMIN — VALPROIC ACID 500 MG: 250 CAPSULE, LIQUID FILLED ORAL at 11:21

## 2018-05-13 ASSESSMENT — ENCOUNTER SYMPTOMS
NUMBNESS: 0
WEAKNESS: 0
SPEECH DIFFICULTY: 0
FEVER: 0

## 2018-05-13 NOTE — ED AVS SNAPSHOT
Emergency Department    [facility] [address]    Phone:  [phone]    Fax:  [phone]                                       Andie Elkins   MRN: [id number]    Department:   Emergency Department   Date of Visit:  5/13/2018           Patient Information     Date Of Birth          1965        Your diagnoses for this visit were:     Pseudoseizure     Acute reaction to stress        You were seen by Cathy Ansari MD.      Follow-up Information     Please follow up.    Why:  See psychiatrist and therapist this week. Return sooner prn.         Discharge Instructions       Therapy appt per DEC. REturn if you have any thoughts of harming yourself.     Discharge References/Attachments     CAUSES AND EFFECTS OF STRESS (ENGLISH)      24 Hour Appointment Hotline       To make an appointment at any [facility], call [phone] ([phone]). If you don't have a family doctor or clinic, we will help you find one. [facility] clinics are conveniently located to serve the needs of you and your family.             Review of your medicines      Our records show that you are taking the medicines listed below. If these are incorrect, please call your family doctor or clinic.        Dose / Directions Last dose taken    benzonatate 200 MG capsule   Commonly known as:  TESSALON   Dose:  200 mg   Quantity:  21 capsule        Take 1 capsule (200 mg) by mouth 3 times daily as needed for cough   Refills:  0        CLONAZEPAM PO        Refills:  0        DEPAKOTE PO   Dose:  500 mg        Take 500 mg by mouth daily   Refills:  0        docusate sodium 100 MG capsule   Commonly known as:  COLACE   Dose:  100 mg        Take 1 capsule (100 mg) by mouth At Bedtime   Refills:  0        GABAPENTIN PO   Dose:  1200 mg        Take 1,200 mg by mouth At Bedtime   Refills:  0        traZODone 100 MG tablet   Commonly known as:  DESYREL   Dose:  200 mg        Take 2 tablets (200 mg) by mouth  At Bedtime   Refills:  0        VENLAFAXINE HCL PO        Refills:  0                Orders Needing Specimen Collection     None      Pending Results     No orders found from 5/11/2018 to 5/14/2018.            Pending Culture Results     No orders found from 5/11/2018 to 5/14/2018.            Pending Results Instructions     If you had any lab results that were not finalized at the time of your Discharge, you can call the ED Lab Result RN at [phone]. You will be contacted by this team for any positive Lab results or changes in treatment. The nurses are available 7 days a week from 10A to 6:30P.  You can leave a message 24 hours per day and they will return your call.        Test Results From Your Hospital Stay               Clinical Quality Measure: Blood Pressure Screening     Your blood pressure was checked while you were in the emergency department today. The last reading we obtained was  BP: 118/66 . Please read the guidelines below about what these numbers mean and what you should do about them.  If your systolic blood pressure (the top number) is less than 120 and your diastolic blood pressure (the bottom number) is less than 80, then your blood pressure is normal. There is nothing more that you need to do about it.  If your systolic blood pressure (the top number) is 120-139 or your diastolic blood pressure (the bottom number) is 80-89, your blood pressure may be higher than it should be. You should have your blood pressure rechecked within a year by a primary care provider.  If your systolic blood pressure (the top number) is 140 or greater or your diastolic blood pressure (the bottom number) is 90 or greater, you may have high blood pressure. High blood pressure is treatable, but if left untreated over time it can put you at risk for heart attack, stroke, or kidney failure. You should have your blood pressure rechecked by a primary care provider within the next 4 weeks.  If your provider in the  emergency department today gave you specific instructions to follow-up with your doctor or provider even sooner than that, you should follow that instruction and not wait for up to 4 weeks for your follow-up visit.        Thank you for choosing [facility]       Thank you for choosing [facility] for your care. Our goal is always to provide you with excellent care. Hearing back from our patients is one way we can continue to improve our services. Please take a few minutes to complete the written survey that you may receive in the mail after you visit with us. Thank you!        [employer] Information     [facility] gives you secure access to your electronic health record. If you see a primary care provider, you can also send messages to your care team and make appointments. If you have questions, please call your primary care clinic.  If you do not have a primary care provider, please call [phone] and they will assist you.        Care EveryWhere ID     This is your Care EveryWhere ID. This could be used by other organizations to access your [facility] medical records  [id number]        Equal Access to Services     NESS LOVELL : Edward Ferreira, gerri em, qadebbie anderson, pancho herrera . So [facility] [phone].    ATENCIÓN: Si habla español, tiene a felipe disposición servicios gratuitos de asistencia lingüística. Llame al [phone].    We comply with applicable federal civil rights laws and [place] laws. We do not discriminate on the basis of race, color, national origin, age, disability, sex, sexual orientation, or gender identity.            After Visit Summary       This is your record. Keep this with you and show to your community pharmacist(s) and doctor(s) at your next visit.

## 2018-05-13 NOTE — ED NOTES
Dr. Ansari evaluated pt and based on that assessment, pt does not require a one on one attendant at this moment in time.

## 2018-05-13 NOTE — ED NOTES
Bed: ED24  Expected date:   Expected time:   Means of arrival:   Comments:  439  53 F seizure  0784
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
## 2018-05-13 NOTE — ED AVS SNAPSHOT
Emergency Department    [address] [place] [address]    Phone:  [phone]    Fax:  [phone]                                       Andie Elkins   MRN: [id number]    Department:   Emergency Department   Date of Visit:  5/13/2018           After Visit Summary Signature Page     I have received my discharge instructions, and my questions have been answered. I have discussed any challenges I see with this plan with the nurse or doctor.    ..........................................................................................................................................  Patient/Patient Representative Signature      ..........................................................................................................................................  Patient Representative Print Name and Relationship to Patient    ..................................................               ................................................  Date                                            Time    ..........................................................................................................................................  Reviewed by Signature/Title    ...................................................              ..............................................  Date                                                            Time

## 2018-05-13 NOTE — ED NOTES
Bed: ED17  Expected date:   Expected time:   Means of arrival:   Comments:  Possible need to move 24

## 2018-05-14 ENCOUNTER — PATIENT OUTREACH (OUTPATIENT)
Dept: CARE COORDINATION | Facility: CLINIC | Age: 53
End: 2018-05-14

## 2018-05-14 NOTE — PROGRESS NOTES
Clinic Care Coordination Contact  [facility]/Voicemail    Clinical Data: Care Coordinator Outreach  Outreach attempted x 1.  Left message on voicemail with call back information and requested return call.  Plan: Care Coordinator will out reach again tomorrow. (will send to Krystal YU to follow up)    Grecia Fontanez \[facility] [place] [facility]\""  Clinic Care Coordinator-  Clinics: [facility] [place], [place], [place]  E-Mail: [email or web address]  Telephone: [phone]

## 2018-05-14 NOTE — LETTER
May 15, 2018      Andie Elkins  [address]  [facility] [address]        Dear Andie,    I am a clinic care coordinator who works with your primary physician, Dmitriy, Christina Zaman, at the [facility]. I recently tried to call and was unable to reach you. I wanted to provide you with my contact information so that you can call me with questions or concerns about your health care. Below is a description of clinic care coordination and how I can further assist you.     The clinic care coordinator is a registered nurse and/or  who understand the health care system. The goal of clinic care coordination is to help you manage your health and improve access to the [facility] system in the most efficient manner. The registered nurse can assist you in meeting your health care goals by providing education, coordinating services, and strengthening the communication among your providers. The  can assist you with financial, behavioral, psychosocial, chemical dependency, counseling, and/or psychiatric resources.    Please feel free to contact me at [phone], with any questions or concerns. We at [facility] are focused on providing you with the highest-quality healthcare experience possible and that all starts with you.     Sincerely,    Krystal Amos, ANTONIA, MSW  Social Work Care Coordinator  P:[phone]    Enclosed: I have enclosed a copy of a 24 Hour Access Plan. This has helpful phone numbers for you to call when needed. Please keep this in an easy to access place to use as needed.

## 2018-05-14 NOTE — LETTER
Health Care Home - Access Care Plan    About Me  Patient Name:  Andie Elkins    YOB: 1965  Age:                             52 year old   Shanelle MRN:            [id number] Telephone Information:     Home Phone [phone]   Mobile [phone]       Address:    [address]  Formerly named [facility] [address] Email address:  [email or web address]      Emergency Contact(s)  Name Relationship Lgl Grd Work Phone Home Phone Mobile Phone   1. NONE PER PT 9/* Other   [phone]              Health Maintenance: Routine Health maintenance Reviewed: Due/Overdue   Health Maintenance Due   Topic Date Due     HIV SCREEN (SYSTEM ASSIGNED)  07/09/1981     DEPRESSION ACTION PLAN Q1 YR  06/01/2017     PHQ-9 Q6 MONTHS  03/13/2018        My Access Plan  Medical Emergency 911   Questions or concerns during clinic hours Primary Clinic Line, I will call the clinic directly: [facility] Nicole Prentiss - [phone]   24 Hour Appointment Line [phone] or  [phone]) (toll free)   24 Hour Nurse Line [phone] (toll free)   Questions or concerns outside clinic hours 24 Hour Appointment Line, I will call the after-hours on-call line:   [facility] [phone] or [phone]) (toll-free)   Preferred Urgent Care     Preferred Hospital     Preferred Pharmacy [id number] - [place], [place] - [address] [facility]     Behavioral Health Crisis Line The National Suicide Prevention Lifeline at 1-656.993.6804 or 919     My Care Team Members  Patient Care Team       Relationship Specialty Notifications Start End    Christina Izaguirre MD PCP - General Pediatrics  12/12/16     Phone: [phone] Fax: [phone]         [address] [place] [address]    Mesha Krishnan PsyD Psychologist Psychology Admissions 12/12/13     Phone: [phone] Fax: [phone]         [address] [place] [address]    Krystal Amos [facility]  Care Coordinator Primary Care -  Admissions 5/14/18     Phone: [phone] Fax: [phone]               My Medical and Care Information  Problem List   Patient Active Problem List   Diagnosis     CARDIOVASCULAR SCREENING; LDL GOAL LESS THAN 160     Lumbago     Hyperlipidemia LDL goal <160     Left shoulder pain     Bipolar II disorder, most recent episode major depressive (H)     Major depressive disorder, recurrent episode, mild (H)     Gastroesophageal reflux disease, esophagitis presence not specified     Nausea     Personal history of malignant neoplasm of breast     Suicidal ideation     TIA (transient ischemic attack)     Seizure (H)

## 2018-05-15 NOTE — PROGRESS NOTES
Clinic Care Coordination Contact  [facility]/Voicemail    Clinical Data: see below    Outreach attempted x 2.  Left message on voicemail with call back information and requested return call.  Plan: Care coordinator will send a [facility] letter and access plan and review in 2 weeks.    Krystal Amos, Social Work Care Coordinator, [facility], [facility]: [place], [place], and [place]   P:[phone] / Signed May 15, 2018

## 2018-05-17 NOTE — PROGRESS NOTES
Clinic Care Coordination Contact    Received a voicemail form the patient asking if Dr. Izaguirre was in network and if she could have some blood drawn for her psychiatrist at the [facility].     CCSW called the patient back. She had already answered her own question. She has [facility] and [facility] is not in network. She will be establishing with [facility] which will be in network and has already made an appointment with a provider in their clinic.     CCSW updated her PCP in epic and encouraged the pt to call if she needs help with getting transitioned to her new primary care.    Krystal Amos, Social Work Care Coordinator, [facility], [facility]: Phone [phone]

## 2018-10-08 ENCOUNTER — HOSPITAL ENCOUNTER (EMERGENCY)
Facility: CLINIC | Age: 53
Discharge: HOME | End: 2018-10-08
Attending: EMERGENCY MEDICINE | Admitting: EMERGENCY MEDICINE
Payer: COMMERCIAL

## 2018-10-08 VITALS
HEART RATE: 78 BPM | BODY MASS INDEX: 24.89 KG/M2 | DIASTOLIC BLOOD PRESSURE: 72 MMHG | HEIGHT: 64 IN | OXYGEN SATURATION: 99 % | TEMPERATURE: 98.2 F | SYSTOLIC BLOOD PRESSURE: 114 MMHG | RESPIRATION RATE: 18 BRPM

## 2018-10-08 DIAGNOSIS — R56.9 SEIZURE-LIKE ACTIVITY (H): ICD-10-CM

## 2018-10-08 LAB
ANION GAP SERPL CALCULATED.3IONS-SCNC: 6 MMOL/L (ref 3–14)
BASOPHILS # BLD AUTO: 0 10E9/L (ref 0–0.2)
BASOPHILS NFR BLD AUTO: 0.8 %
BUN SERPL-MCNC: 14 MG/DL (ref 7–30)
CALCIUM SERPL-MCNC: 8.8 MG/DL (ref 8.5–10.1)
CHLORIDE SERPL-SCNC: 105 MMOL/L (ref 94–109)
CO2 SERPL-SCNC: 28 MMOL/L (ref 20–32)
CREAT SERPL-MCNC: 0.76 MG/DL (ref 0.52–1.04)
DIFFERENTIAL METHOD BLD: ABNORMAL
EOSINOPHIL # BLD AUTO: 0.1 10E9/L (ref 0–0.7)
EOSINOPHIL NFR BLD AUTO: 2 %
ERYTHROCYTE [DISTWIDTH] IN BLOOD BY AUTOMATED COUNT: 12.1 % (ref 10–15)
GFR SERPL CREATININE-BSD FRML MDRD: 79 ML/MIN/1.7M2
GLUCOSE SERPL-MCNC: 91 MG/DL (ref 70–99)
HCT VFR BLD AUTO: 33.9 % (ref 35–47)
HGB BLD-MCNC: 11.4 G/DL (ref 11.7–15.7)
IMM GRANULOCYTES # BLD: 0 10E9/L (ref 0–0.4)
IMM GRANULOCYTES NFR BLD: 0.3 %
INTERPRETATION ECG - MUSE: NORMAL
LYMPHOCYTES # BLD AUTO: 0.9 10E9/L (ref 0.8–5.3)
LYMPHOCYTES NFR BLD AUTO: 22.4 %
MCH RBC QN AUTO: 31.6 PG (ref 26.5–33)
MCHC RBC AUTO-ENTMCNC: 33.6 G/DL (ref 31.5–36.5)
MCV RBC AUTO: 94 FL (ref 78–100)
MONOCYTES # BLD AUTO: 0.3 10E9/L (ref 0–1.3)
MONOCYTES NFR BLD AUTO: 8.6 %
NEUTROPHILS # BLD AUTO: 2.6 10E9/L (ref 1.6–8.3)
NEUTROPHILS NFR BLD AUTO: 65.9 %
NRBC # BLD AUTO: 0 10*3/UL
NRBC BLD AUTO-RTO: 0 /100
PLATELET # BLD AUTO: 216 10E9/L (ref 150–450)
POTASSIUM SERPL-SCNC: 3.8 MMOL/L (ref 3.4–5.3)
RBC # BLD AUTO: 3.61 10E12/L (ref 3.8–5.2)
SODIUM SERPL-SCNC: 139 MMOL/L (ref 133–144)
VALPROATE SERPL-MCNC: 3 MG/L (ref 50–100)
WBC # BLD AUTO: 4 10E9/L (ref 4–11)

## 2018-10-08 PROCEDURE — 80164 ASSAY DIPROPYLACETIC ACD TOT: CPT | Performed by: PHYSICIAN ASSISTANT

## 2018-10-08 PROCEDURE — 80048 BASIC METABOLIC PNL TOTAL CA: CPT | Performed by: PHYSICIAN ASSISTANT

## 2018-10-08 PROCEDURE — 25000132 ZZH RX MED GY IP 250 OP 250 PS 637: Performed by: PHYSICIAN ASSISTANT

## 2018-10-08 PROCEDURE — 93005 ELECTROCARDIOGRAM TRACING: CPT

## 2018-10-08 PROCEDURE — 99284 EMERGENCY DEPT VISIT MOD MDM: CPT

## 2018-10-08 PROCEDURE — 85025 COMPLETE CBC W/AUTO DIFF WBC: CPT | Performed by: PHYSICIAN ASSISTANT

## 2018-10-08 RX ORDER — ACETAMINOPHEN 325 MG/1
975 TABLET ORAL ONCE
Status: COMPLETED | OUTPATIENT
Start: 2018-10-08 | End: 2018-10-08

## 2018-10-08 RX ADMIN — ACETAMINOPHEN 325 MG: 325 TABLET, FILM COATED ORAL at 11:15

## 2018-10-08 ASSESSMENT — ENCOUNTER SYMPTOMS
HEADACHES: 1
SHORTNESS OF BREATH: 0
NUMBNESS: 0
FEVER: 0
DIZZINESS: 0
WEAKNESS: 0
SEIZURES: 1
NECK STIFFNESS: 0
SPEECH DIFFICULTY: 0
NECK PAIN: 0

## 2018-10-08 NOTE — ED AVS SNAPSHOT
Emergency Department    [address] [place] [address]    Phone:  [phone]    Fax:  [phone]                                       Andie Elkins   MRN: [id number]    Department:   Emergency Department   Date of Visit:  10/8/2018           After Visit Summary Signature Page     I have received my discharge instructions, and my questions have been answered. I have discussed any challenges I see with this plan with the nurse or doctor.    ..........................................................................................................................................  Patient/Patient Representative Signature      ..........................................................................................................................................  Patient Representative Print Name and Relationship to Patient    ..................................................               ................................................  Date                                   Time    ..........................................................................................................................................  Reviewed by Signature/Title    ...................................................              ..............................................  Date                                               Time          22EPIC Rev 08/18

## 2018-10-08 NOTE — DISCHARGE INSTRUCTIONS
Discharge Instructions  Recurrent Seizure (Convulsion)    You were seen today for a seizure. The most common reason for a recurrent seizure is having missed a dose of your medication or taken it at a different time than normal. Other things that increase the risk of seizures include fever, sleep deprivation, alcohol, and stress. Although anti-seizure medications (anti-epileptic drugs) work for many people with seizure disorders, some people continue to have seizures even after trying several medications.    Generally, every Emergency Department visit should have a follow-up clinic visit with either a primary or a specialty clinic/provider. Please follow-up as instructed by your emergency provider today.    Return to the Emergency Department if:     You develop a fever over 100.4 F.    You feel much more ill, or develop new symptoms like severe headache.    You have trouble walking, seeing, or develop weakness or numbness in your arms or legs.     What can I do to help myself?    Take your medication exactly as directed, at the right times, and the right doses.     If you develop uncomfortable side effects, do not stop taking your anti-seizure medication without first speaking to your provider.     Do not let your prescription run out. Stopping anti-seizure medication abruptly can put you at risk of a seizure.    While taking an anti-seizure medication, do not start taking any other medications including over-the-counter medications and herbal supplements without first checking with your provider because mixing them can be dangerous.    Do not drive until you have been rechecked by your provider and have been told it is safe to drive.  If you have a seizure while driving you may cause a motor vehicle accident with injury or death to yourself or others.     Do not swim, climb ladders, or do anything else that would be dangerous if you had another seizure or spell of loss of consciousness, until you are cleared by your  provider.      Check your state driving requirements for patients with seizures on the Epilepsy Foundation Website at www.epilepsyfoundation.org/resources/drivingandtravel.cfm.    Do not drink alcohol.  Drinking alcohol increases the risk of seizures and can interfere with the effect of anti-seizure medications.    Start a seizure calendar to record any seizure triggers, such as days when you were sleep-deprived, stressed, drank alcohol, or (if you are a woman) had your period.    Remember, if one medication does not work for you, either because you cannot tolerate the side effects or because you continue to have seizures, your provider can suggest alternate medications or alternate methods of taking the medication.  If you were given a prescription for medicine here today, be sure to read all of the information (including the package insert) that comes with your prescription.  This will include important information about the medicine, its side effects, and any warnings that you need to know about.  The pharmacist who fills the prescription can provide more information and answer questions you may have about the medicine.  If you have questions or concerns that the pharmacist cannot address, please call or return to the Emergency Department.   Remember that you can always come back to the Emergency Department if you are not able to see your regular provider in the amount of time listed above, if you get any new symptoms, or if there is anything that worries you.

## 2018-10-08 NOTE — ED NOTES
Bed: ED03  Expected date:   Expected time:   Means of arrival:   Comments:   Isidro 54yo F seizure

## 2018-10-08 NOTE — ED AVS SNAPSHOT
Emergency Department    [address] [facility] [address]    Phone:  [phone]    Fax:  [phone]                                       Andie Elkins   MRN: [id number]    Department:   Emergency Department   Date of Visit:  10/8/2018           Patient Information     Date Of Birth          1965        Your diagnoses for this visit were:     Seizure-like activity (H)        You were seen by Pio Moise MD.      Follow-up Information     Follow up with  Emergency Department.    Specialty:  EMERGENCY MEDICINE    Why:  As needed, If symptoms worsen    Contact information:    [address] [facility] [address] [phone]        Follow up with Clinic, [facility] In 2 days.    Why:  For medication management    Contact information:    [address] [facility] [address] [phone]          Discharge Instructions       Discharge Instructions  Recurrent Seizure (Convulsion)    You were seen today for a seizure. The most common reason for a recurrent seizure is having missed a dose of your medication or taken it at a different time than normal. Other things that increase the risk of seizures include fever, sleep deprivation, alcohol, and stress. Although anti-seizure medications (anti-epileptic drugs) work for many people with seizure disorders, some people continue to have seizures even after trying several medications.    Generally, every Emergency Department visit should have a follow-up clinic visit with either a primary or a specialty clinic/provider. Please follow-up as instructed by your emergency provider today.    Return to the Emergency Department if:     You develop a fever over 100.4 F.    You feel much more ill, or develop new symptoms like severe headache.    You have trouble walking, seeing, or develop weakness or numbness in your arms or legs.     What can I do to help myself?    Take your medication exactly as directed, at the right  times, and the right doses.     If you develop uncomfortable side effects, do not stop taking your anti-seizure medication without first speaking to your provider.     Do not let your prescription run out. Stopping anti-seizure medication abruptly can put you at risk of a seizure.    While taking an anti-seizure medication, do not start taking any other medications including over-the-counter medications and herbal supplements without first checking with your provider because mixing them can be dangerous.    Do not drive until you have been rechecked by your provider and have been told it is safe to drive.  If you have a seizure while driving you may cause a motor vehicle accident with injury or death to yourself or others.     Do not swim, climb ladders, or do anything else that would be dangerous if you had another seizure or spell of loss of consciousness, until you are cleared by your provider.      Check your state driving requirements for patients with seizures on the Epilepsy Foundation Website at www.epilepsyfoundation.org/resources/drivingandtravel.cfm.    Do not drink alcohol.  Drinking alcohol increases the risk of seizures and can interfere with the effect of anti-seizure medications.    Start a seizure calendar to record any seizure triggers, such as days when you were sleep-deprived, stressed, drank alcohol, or (if you are a woman) had your period.    Remember, if one medication does not work for you, either because you cannot tolerate the side effects or because you continue to have seizures, your provider can suggest alternate medications or alternate methods of taking the medication.  If you were given a prescription for medicine here today, be sure to read all of the information (including the package insert) that comes with your prescription.  This will include important information about the medicine, its side effects, and any warnings that you need to know about.  The pharmacist who fills the  prescription can provide more information and answer questions you may have about the medicine.  If you have questions or concerns that the pharmacist cannot address, please call or return to the Emergency Department.   Remember that you can always come back to the Emergency Department if you are not able to see your regular provider in the amount of time listed above, if you get any new symptoms, or if there is anything that worries you.      24 Hour Appointment Hotline       To make an appointment at any [facility], call [phone] ([phone]). If you don't have a family doctor or clinic, we will help you find one. [facility] clinics are conveniently located to serve the needs of you and your family.             Review of your medicines      Our records show that you are taking the medicines listed below. If these are incorrect, please call your family doctor or clinic.        Dose / Directions Last dose taken    CALCIUM ANTACID PO        Refills:  0        CLONAZEPAM PO        Refills:  0        DEPAKOTE PO   Dose:  500 mg        Take 500 mg by mouth daily   Refills:  0        docusate sodium 100 MG capsule   Commonly known as:  COLACE   Dose:  100 mg        Take 1 capsule (100 mg) by mouth At Bedtime   Refills:  0        GABAPENTIN PO   Dose:  1200 mg        Take 1,200 mg by mouth At Bedtime   Refills:  0        traZODone 100 MG tablet   Commonly known as:  DESYREL   Dose:  200 mg        Take 2 tablets (200 mg) by mouth At Bedtime   Refills:  0        VITAMIN D (CHOLECALCIFEROL) PO        Take by mouth daily   Refills:  0                Procedures and tests performed during your visit     Basic metabolic panel    CBC with platelets differential    EKG 12-lead, tracing only    Valproic acid (Depakote level)      Orders Needing Specimen Collection     None      Pending Results     No orders found from 10/6/2018 to 10/9/2018.            Pending Culture Results     No orders found from 10/6/2018 to  10/9/2018.            Pending Results Instructions     If you had any lab results that were not finalized at the time of your Discharge, you can call the ED Lab Result RN at [phone]. You will be contacted by this team for any positive Lab results or changes in treatment. The nurses are available 7 days a week from 10A to 6:30P.  You can leave a message 24 hours per day and they will return your call.        Test Results From Your Hospital Stay        10/8/2018 11:33 AM      Component Results     Component Value Ref Range & Units Status    Sodium 139 133 - 144 mmol/L Final    Potassium 3.8 3.4 - 5.3 mmol/L Final    Chloride 105 94 - 109 mmol/L Final    Carbon Dioxide 28 20 - 32 mmol/L Final    Anion Gap 6 3 - 14 mmol/L Final    Glucose 91 70 - 99 mg/dL Final    Urea Nitrogen 14 7 - 30 mg/dL Final    Creatinine 0.76 0.52 - 1.04 mg/dL Final    GFR Estimate 79 >60 mL/min/1.7m2 Final    Non  GFR Calc    GFR Estimate If Black >90 >60 mL/min/1.7m2 Final    African American GFR Calc    Calcium 8.8 8.5 - 10.1 mg/dL Final         10/8/2018 12:28 PM      Component Results     Component Value Ref Range & Units Status    Valproic Acid Level 3 (L) 50 - 100 mg/L Final         10/8/2018 11:17 AM      Component Results     Component Value Ref Range & Units Status    WBC 4.0 4.0 - 11.0 10e9/L Final    RBC Count 3.61 (L) 3.8 - 5.2 10e12/L Final    Hemoglobin 11.4 (L) 11.7 - 15.7 g/dL Final    Hematocrit 33.9 (L) 35.0 - 47.0 % Final    MCV 94 78 - 100 fl Final    MCH 31.6 26.5 - 33.0 pg Final    MCHC 33.6 31.5 - 36.5 g/dL Final    RDW 12.1 10.0 - 15.0 % Final    Platelet Count 216 150 - 450 10e9/L Final    Diff Method Automated Method  Final    % Neutrophils 65.9 % Final    % Lymphocytes 22.4 % Final    % Monocytes 8.6 % Final    % Eosinophils 2.0 % Final    % Basophils 0.8 % Final    % Immature Granulocytes 0.3 % Final    Nucleated RBCs 0 0 /100 Final    Absolute Neutrophil 2.6 1.6 - 8.3 10e9/L Final    Absolute  Lymphocytes 0.9 0.8 - 5.3 10e9/L Final    Absolute Monocytes 0.3 0.0 - 1.3 10e9/L Final    Absolute Eosinophils 0.1 0.0 - 0.7 10e9/L Final    Absolute Basophils 0.0 0.0 - 0.2 10e9/L Final    Abs Immature Granulocytes 0.0 0 - 0.4 10e9/L Final    Absolute Nucleated RBC 0.0  Final                Clinical Quality Measure: Blood Pressure Screening     Your blood pressure was checked while you were in the emergency department today. The last reading we obtained was  BP: 114/72 . Please read the guidelines below about what these numbers mean and what you should do about them.  If your systolic blood pressure (the top number) is less than 120 and your diastolic blood pressure (the bottom number) is less than 80, then your blood pressure is normal. There is nothing more that you need to do about it.  If your systolic blood pressure (the top number) is 120-139 or your diastolic blood pressure (the bottom number) is 80-89, your blood pressure may be higher than it should be. You should have your blood pressure rechecked within a year by a primary care provider.  If your systolic blood pressure (the top number) is 140 or greater or your diastolic blood pressure (the bottom number) is 90 or greater, you may have high blood pressure. High blood pressure is treatable, but if left untreated over time it can put you at risk for heart attack, stroke, or kidney failure. You should have your blood pressure rechecked by a primary care provider within the next 4 weeks.  If your provider in the emergency department today gave you specific instructions to follow-up with your doctor or provider even sooner than that, you should follow that instruction and not wait for up to 4 weeks for your follow-up visit.        Thank you for choosing [facility]       Thank you for choosing [facility] for your care. Our goal is always to provide you with excellent care. Hearing back from our patients is one way we can continue to improve our services. Please  take a few minutes to complete the written survey that you may receive in the mail after you visit with us. Thank you!        [employer] Information     [employer] gives you secure access to your electronic health record. If you see a primary care provider, you can also send messages to your care team and make appointments. If you have questions, please call your primary care clinic.  If you do not have a primary care provider, please call [phone] and they will assist you.        Care EveryWhere ID     This is your Care EveryWhere ID. This could be used by other organizations to access your [facility] medical records  [id number]        Equal Access to Services     [facility] : Edward Ferreira, gerri em, red anderson, pancho weiss. So [facility] [phone].    ATENCIÓN: Si habla español, tiene a felipe disposición servicios gratuitos de asistencia lingüística. Tammyame al [phone].    We comply with applicable federal civil rights laws and [place] laws. We do not discriminate on the basis of race, color, national origin, age, disability, sex, sexual orientation, or gender identity.            After Visit Summary       This is your record. Keep this with you and show to your community pharmacist(s) and doctor(s) at your next visit.

## 2018-10-08 NOTE — ED PROVIDER NOTES
History     Chief Complaint:  Seizures      HPI Andie Elkins is a 53 year old female with history of bipolar disorder and pseudoseizures who presents after seizure-like activity while at work.  The patient states that she was feeling fine this morning prior to going to work, at which time she had a stressful encounter with a coworker.  She attempted to utilize some of the skills for relaxation which she has been taught previously, but states that these were not successful and she experienced a seizure which she does not remember.  She denies bowel or bladder incontinence or tongue biting.  The patient has an extensive past history of these prior episodes and has been previously admitted to the hospital for MRI and EEG monitoring receiving the diagnosis of pseudoseizures.  The patient states that normally when she has these she is fine afterwards as long as she is able to relax, though in this instance coworkers called an ambulance before she was able to refuse.  Patient describes as this episode feeling similar to previous.  She does admit to a low-grade headache, but says this is common during times of stress.  She has no neck pain, numbness or weakness, speech difficulty, vision changes, dizziness or vertigo.  She has not had any fever or felt poorly otherwise.  The patient takes Depakote and clonazepam for her bipolar and anxiety and states that she has been taking all of these normally.  She denies alcohol use or pregnancy.    Allergies:  Codeine  Penicillins     Medications:    Calcium  Clonazepam  Depakote  Colace  Gabapentin  Desyrel     Past Medical History:    Bipolar disorder  Breast cancer  Depressive disorder  MI  Seizures  TIA  Suicidal ideation    Past Surgical History:    Lower arm reconstruction  Gyn surgery  Lumpectomy  Orthopedic surgery    Family History:    Lipids  HTN  Cerebrovascular disease  Depression  Diabetes  CAD  Cancer  Dementia  Blood cloots  Epilepsy  MI    Social History:  Former  "smoker  Occasional alcohol use  Marital Status:        Review of Systems   Constitutional: Negative for fever.   Eyes: Negative for visual disturbance.   Respiratory: Negative for shortness of breath.    Cardiovascular: Negative for chest pain.   Musculoskeletal: Negative for neck pain and neck stiffness.   Neurological: Positive for seizures and headaches. Negative for dizziness, speech difficulty, weakness and numbness.   All other systems reviewed and are negative.    Physical Exam   First Vitals:  BP: 120/74  Pulse: 78  Temp: 98.2  F (36.8  C)  Resp: 18  Height: 162.6 cm (5' 4\")  SpO2: 95 %      Physical Exam  General: Alert and cooperative with exam. Normal mentation.  Head:  Scalp is NC/AT  Eyes:  No scleral icterus, PERRL, EOMI   ENT:  The external nose and ears are normal. The oropharynx is normal and without erythema; mucus membranes are moist.  No evidence of tongue biting or fasciculations.  Neck:  Normal range of motion without rigidity.  CV:  Regular rate and rhythm    No pathologic murmur, rubs, or gallops.  Resp:  Breath sounds are clear bilaterally.  No crackles, wheezes, rhonchi.    Non-labored, no retractions or accessory muscle use  GI:  Abdomen is soft, no distension, no tenderness. No peritoneal signs.  MS:  No midline cervical, thoracic, or lumbar tenderness  Skin:  Warm and dry, No rash or lesions noted.  Neuro: Oriented x 3. No gross motor deficits.    Strength and sensation grossly intact in all 4 extremities.      Cranial nerves 2-12 intact.    GCS: 15    Normal finger to nose and heel to shin testing    Gait normal  Psych: Awake. Alert. Normal affect. Appropriate interactions.      Emergency Department Course   ECG:  Time: 1130  Vent. Rate 67 bpm. KS interval 146. QRS duration 78. QT/QTc 380/401. P-R-T axis 9 54 40. Normal sinus rhythm. Cannot rule out anterior infarct, age undetermined. Abnormal ECG. No significant change compared to EKG dated 3/12/18 Read time: " 1132    Laboratory:  BMP: WNL (Creatinine: 0.76)(Glucose: 91)  CBC: WBC: 4.0, HGB: 11.4 (L), PLT: 216  Valproic acid: 3 (L)    Interventions:  1115 - Tylenol 975 mg PO    Emergency Department Course:  Nursing notes and vitals reviewed.  1054: I performed an exam of the patient as documented above.     1245: I rechecked the patient. Findings and plan explained to the Patient. Patient discharged home with instructions regarding supportive care, medications, and reasons to return. The importance of close follow-up was reviewed.         Impression & Plan    Medical Decision Making:  Andie Elkins is a 53-year-old female with a past history of pseudoseizures and bipolar disorder who presents after seizure at work.  Patient history and records reviewed.  Broad differential was considered.  Lab work was obtained as above and remarkable for subtherapeutic valproic acid level but otherwise normal.  EKG demonstrates sinus rhythm but otherwise unremarkable.    I suspect the patient's presentation is likely consistent with 1 of her pseudoseizures.  She has had numerous similar episodes in the past which are triggered by stress.  Per the patient she was feeling fine prior to the stressful encounter at work today.  Patient has had extensive prior inpatient evaluation including MRI and EEG testing which found her episodes to be consistent with pseudoseizures brought on by stress.  Her episode today does seem consistent with these episodes.  she has had mild headache with this in previous episodes and was given a dosage of Tylenol as above.  She did not have any tongue biting or urinary or bowel incontinence with this episode and has not had this with prior episodes either.  She does not have any fever, nuchal rigidity, or other symptoms to suggest CNS infection.  No neurologic deficits, vision changes, history of head trauma to suggest need for imaging.  No chest pain or shortness of breath, and normal EKG do not suspect atypical  cardiac syncope at this time.  She is at baseline at this time.  The patient will be discharged home with follow-up with her psychiatrist.  I did discuss with the patient that her Depakote level was subtherapeutic today, however she is not on this for anticonvulsive treatment, but rather for her bipolar disorder.  Discussed indications to return to the ED if new or worsening symptoms.    Diagnosis:    ICD-10-CM    1. Seizure-like activity (H) R56.9        Disposition:  discharged to home    Kavin Penaloza PA-C  10/8/2018    EMERGENCY DEPARTMENT        Emergency Department Attending Supervision Note  10/8/2018  11:51 AM      I evaluated this patient in conjunction with an Advanced Practice Clinician:    APC: Ca    Briefly, the patient presented with an episode of self described pseudoseizure.  She has a history of bipolar disorder and takes valproate for that.  She does not have a definitive history of epilepsy.  The patient had an episode of stress this morning, which she can normally manage, but ended up having a brief pseudoseizure.  She has been normal in the emergency department and asymptomatic.  There is no signs of biting of the tongue, intraoral blood, or bowel or bladder features.    Examination:   General: Resting comfortably on the gurney  Head:  The scalp, face, and head appear normal  Eyes:  The pupils are normal    Conjunctivae and sclera appear normal  ENT:    The nose is normal    Ears/pinnae are normal  Lungs: Clear  CV:  Normal  Neck:  Normal range of motion  MS:  Normal  Skin:  No rash or lesions noted.  Neuro: Speech is normal and fluent    GCS is 15  Psych:  Awake. Alert.  Normal affect.      Appropriate interactions        Medical decision making:  This patient presents with an episode of pseudoseizure.  She is aware of this and has good insight into this situation.  These typically occur in the setting of stress and she had a an episode with confrontation with a coworker this morning which  was quite stressful.  There is been no seizure activity here.  Serum electrolytes are within normal limits at this time.    My impression/diagnosis is:    Pseudoseizure  Stress      MD Ca Kline Clark Ellsworth, PA-C  10/08/18 1754       Pio Moise MD  10/08/18 0196

## 2019-09-20 ENCOUNTER — HOSPITAL ENCOUNTER (EMERGENCY)
Facility: CLINIC | Age: 54
Discharge: HOME | End: 2019-09-20
Attending: EMERGENCY MEDICINE | Admitting: EMERGENCY MEDICINE
Payer: OTHER MISCELLANEOUS

## 2019-09-20 VITALS
SYSTOLIC BLOOD PRESSURE: 116 MMHG | HEART RATE: 66 BPM | BODY MASS INDEX: 24.24 KG/M2 | TEMPERATURE: 97.5 F | HEIGHT: 64 IN | DIASTOLIC BLOOD PRESSURE: 62 MMHG | OXYGEN SATURATION: 99 % | RESPIRATION RATE: 18 BRPM | WEIGHT: 142 LBS

## 2019-09-20 DIAGNOSIS — S61.210A LACERATION OF RIGHT INDEX FINGER WITHOUT FOREIGN BODY WITHOUT DAMAGE TO NAIL, INITIAL ENCOUNTER: ICD-10-CM

## 2019-09-20 PROCEDURE — 12001 RPR S/N/AX/GEN/TRNK 2.5CM/<: CPT

## 2019-09-20 PROCEDURE — 99283 EMERGENCY DEPT VISIT LOW MDM: CPT

## 2019-09-20 PROCEDURE — 25000132 ZZH RX MED GY IP 250 OP 250 PS 637: Performed by: EMERGENCY MEDICINE

## 2019-09-20 RX ORDER — IBUPROFEN 600 MG/1
600 TABLET, FILM COATED ORAL ONCE
Status: COMPLETED | OUTPATIENT
Start: 2019-09-20 | End: 2019-09-20

## 2019-09-20 RX ADMIN — IBUPROFEN 600 MG: 600 TABLET ORAL at 10:04

## 2019-09-20 ASSESSMENT — ENCOUNTER SYMPTOMS
WOUND: 1
NUMBNESS: 1

## 2019-09-20 ASSESSMENT — MIFFLIN-ST. JEOR: SCORE: 1219.11

## 2019-09-20 NOTE — ED PROVIDER NOTES
"  History     Chief Complaint:  Laceration    The history is provided by the patient.      Andie Elkins is a 54 year old female who presents to the emergency department today for evaluation of a laceration. The patient reports she was using a tomato slicer when she sustained a laceration to the tip of her right index finger. The patient does endorse some numbness in the digit, but says this could be her baseline from a previous injury. She reports the tomato slicer was clean, and she washed the wound immediatly with soap and water. She denies any other injuries and reports her tetanus was last updated approximately eight years ago.     Allergies:  Codeine  Penicillins    Medications:    Clonazepam  Depakote  Colace  Gabapentin  Trazodone     Past Medical History:    Bipolar disorder  Myocardial infarction  Breast cancer  Depression  GERD  Seizures  TIA  Hyperlipidemia  Lumbago  IBS    Past Surgical History:    Arm surgery, reconstruction after a fracture  Ovarian cyst removal  Lumpectomy, left breast  Orthopedic surgery     Family History:    Hypertension  Cerebrovascular disease  Heart disease- mother  Diabetes  CAD- father  Cancer, lung  Alcohol/Drug  Epilepsy  MI- brother    Social History:  The patient was unaccompanied to the ED.  Smoking Status: Former smoker  Smokeless Tobacco: Never Used  Alcohol Use: Positive  Drug Use: Negative    Marital Status:       Review of Systems   Skin: Positive for wound (index finger, right).   Neurological: Positive for numbness (index finger, right).   All other systems reviewed and are negative.    Physical Exam     Patient Vitals for the past 24 hrs:   BP Temp Heart Rate Resp SpO2 Height Weight   09/20/19 0849 116/62 97.5  F (36.4  C) 69 16 96 % 1.626 m (5' 4\") 64.4 kg (142 lb)      Physical Exam    VS: Reviewed per above  HENT: Mucous membranes moist  EYES: sclera anicteric  CV: Rate as noted,  regular rhythm.   RESP: Effort normal. Breath sounds are normal " bilaterally.  NEURO: Alert, moving all extremities  MSK: No deformity of the extremities. Diminished sensation to light touch in the right ulnar distal index finger.  FDS, FDP function is intact in the right index finger.  SKIN: Warm and dry.  There is a 1.5 cm T shaped superficial laceration over the distal right tip of the index finger.    Emergency Department Course     Procedures:    Laceration Repair        LACERATION:  A simple and superficial clean 1.5 cm laceration.      LOCATION:  Right distal index finger      ANESTHESIA:  Digital block using lidocaine 1%, total of 3 mLs      PREPARATION:  Irrigation with Normal Saline and soap      DEBRIDEMENT:  no debridement      CLOSURE:  Wound was closed with One Layer.  Skin closed with 5 x 5.0 Ethylon using interrupted sutures.       Interventions:  1004 Ibuprofen 600 mg PO    Emergency Department Course:  0904 Nursing notes and vitals reviewed.  0915 I performed an exam of the patient as documented above.   0945 I preformed a laceration repair on the patient as detailed above.    Findings and plan explained to the Patient. Patient discharged home with instructions regarding supportive care, medications, and reasons to return. The importance of close follow-up was reviewed.     I personally reviewed the plan with the Patient and answered all related questions prior to discharge.    Impression & Plan      Medical Decision Making:  Patient presents to the ER for evaluation of right index finger laceration.  Based on mechanism, there is low suspicion for occult foreign body or bony fracture.  Patient also declined x-ray to evaluate for this further.  Per patient, her Tdap was updated within the past 10 years.  There was some underlying sensory deficit over the ulnar distal right index finger, which may have been there prior to the injury.  Laceration was repaired according to procedure note above.  Wound was dressed with bandages and pt provided with foam aluminum  finger splint.  Plan for suture removal in 7 days.  Close return precautions discussed prior to discharge.        Diagnosis:    ICD-10-CM    1. Laceration of right index finger without foreign body without damage to nail, initial encounter S61.210A        Disposition:  The patient is discharged to home.     Scribe Disclosure:  Fallon WETZEL, am serving as a scribe at 9:17 AM on 9/20/2019 to document services personally performed by Hunter Anderson MD based on my observations and the provider's statements to me.    9/20/2019    EMERGENCY DEPARTMENT       Hunter Anderson MD  09/20/19 0004

## 2019-09-20 NOTE — ED AVS SNAPSHOT
Emergency Department  [address] [place] [address]  Phone:  [phone]  Fax:  [phone]                                    Andie Elkins   MRN: [id number]    Department:   Emergency Department   Date of Visit:  9/20/2019           After Visit Summary Signature Page    I have received my discharge instructions, and my questions have been answered. I have discussed any challenges I see with this plan with the nurse or doctor.    ..........................................................................................................................................  Patient/Patient Representative Signature      ..........................................................................................................................................  Patient Representative Print Name and Relationship to Patient    ..................................................               ................................................  Date                                   Time    ..........................................................................................................................................  Reviewed by Signature/Title    ...................................................              ..............................................  Date                                               Time          22EPIC Rev 08/18

## 2020-01-17 NOTE — ADDENDUM NOTE
Addended by: ELADIO HUTTON on: 1/31/2017 03:41 PM     Modules accepted: Orders     Pt is a 49 yo IM with h/o ETOH abuse with multiple admissions 2 to DTs/ ETOH withdrawal. Pt admitted 2 to alcoholic gastritis and ETOH withdrawal with course complicated by DTs. Pt with CIWA >20 on 1/9/2020 despite Ativan taper. Pt transferred to the ICU; admitting dx: ETOH withdrawal/DTs; pt calm today    Resp: [facility] HOB  ID: Off Abx  FEN: Cont enteral feeds/Daily Thiamine, MVI and Folate  Neuro/Psych: Dc Haldol and may give Phenobarb prn 2 to elevated Phenobarb level  Social: May transfer to [facility] today

## 2020-02-17 ENCOUNTER — HEALTH MAINTENANCE LETTER (OUTPATIENT)
Age: 55
End: 2020-02-17

## 2020-04-27 ENCOUNTER — APPOINTMENT (OUTPATIENT)
Dept: GENERAL RADIOLOGY | Facility: CLINIC | Age: 55
End: 2020-04-27
Attending: EMERGENCY MEDICINE
Payer: MEDICAID

## 2020-04-27 ENCOUNTER — HOSPITAL ENCOUNTER (EMERGENCY)
Facility: CLINIC | Age: 55
Discharge: OTHER INSTITUTION | End: 2020-04-27
Attending: EMERGENCY MEDICINE | Admitting: EMERGENCY MEDICINE
Payer: MEDICAID

## 2020-04-27 VITALS
WEIGHT: 151 LBS | BODY MASS INDEX: 25.78 KG/M2 | TEMPERATURE: 98.6 F | OXYGEN SATURATION: 97 % | HEIGHT: 64 IN | RESPIRATION RATE: 18 BRPM | HEART RATE: 111 BPM | SYSTOLIC BLOOD PRESSURE: 104 MMHG | DIASTOLIC BLOOD PRESSURE: 65 MMHG

## 2020-04-27 DIAGNOSIS — M79.631 PAIN OF RIGHT FOREARM: ICD-10-CM

## 2020-04-27 DIAGNOSIS — M25.521 RIGHT ELBOW PAIN: ICD-10-CM
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
## 2020-04-27 DIAGNOSIS — M25.421 EFFUSION OF RIGHT ELBOW: ICD-10-CM

## 2020-04-27 PROCEDURE — 99285 EMERGENCY DEPT VISIT HI MDM: CPT | Mod: 25

## 2020-04-27 PROCEDURE — 73090 X-RAY EXAM OF FOREARM: CPT | Mod: RT

## 2020-04-27 PROCEDURE — 29105 APPLICATION LONG ARM SPLINT: CPT | Mod: RT

## 2020-04-27 PROCEDURE — 73080 X-RAY EXAM OF ELBOW: CPT | Mod: RT

## 2020-04-27 PROCEDURE — 73060 X-RAY EXAM OF HUMERUS: CPT | Mod: RT

## 2020-04-27 PROCEDURE — 25000132 ZZH RX MED GY IP 250 OP 250 PS 637: Performed by: EMERGENCY MEDICINE

## 2020-04-27 RX ORDER — OXYCODONE AND ACETAMINOPHEN 5; 325 MG/1; MG/1
1-2 TABLET ORAL EVERY 4 HOURS PRN
Qty: 12 TABLET | Refills: 0 | Status: SHIPPED | OUTPATIENT
Start: 2020-04-27

## 2020-04-27 RX ORDER — ACETAMINOPHEN 325 MG/1
650 TABLET ORAL ONCE
Status: COMPLETED | OUTPATIENT
Start: 2020-04-27 | End: 2020-04-27

## 2020-04-27 RX ADMIN — ACETAMINOPHEN 650 MG: 325 TABLET, FILM COATED ORAL at 19:19

## 2020-04-27 ASSESSMENT — ENCOUNTER SYMPTOMS
WOUND: 0
HEADACHES: 0

## 2020-04-27 ASSESSMENT — MIFFLIN-ST. JEOR: SCORE: 1259.93

## 2020-04-27 NOTE — ED AVS SNAPSHOT
Emergency Department  [address] [place] [address]  Phone:  [phone]  Fax:  [phone]                                    Andie Elkins   MRN: [id number]    Department:   Emergency Department   Date of Visit:  4/27/2020           After Visit Summary Signature Page    I have received my discharge instructions, and my questions have been answered. I have discussed any challenges I see with this plan with the nurse or doctor.    ..........................................................................................................................................  Patient/Patient Representative Signature      ..........................................................................................................................................  Patient Representative Print Name and Relationship to Patient    ..................................................               ................................................  Date                                   Time    ..........................................................................................................................................  Reviewed by Signature/Title    ...................................................              ..............................................  Date                                               Time          22EPIC Rev 08/18

## 2020-04-27 NOTE — ED PROVIDER NOTES
"  History   Chief Complaint:  Fall     HPI   Andie Elkins is a 54 year old female who is right hand dominant with history of breast cancer, transient ischemic attack, and hyperlipidemia who presents with fall. The patient reports she was at home walking up the stairs. She fell forward landing on her right hand but the majority of her pain is in her elbow and humerus. She did not hit her head and had no abrasions or lacerations. No medication prior to arrival.     Allergies:  Codeine  Penicillins    Medications:   Clonazepam  Depakote  Colace   Gabapentin  Trazodone     Past Medical History:    Bipolar II disorder  Breast cancer  Depressive disorder   myocardial infarction  Seizure  Transient ischemic attack   Suicidal ideation   GERD  Hyperlipidemia      Past Surgical History:    Reconstruction fracture   GYN surgery   Lumpectomy breast  Orthopedic surgery      Family History:    Mother: lipids, hypertension, cerebrovascular disease, depression, diabetes mellitus, heart disease  Father: coronary artery disease, diabetes mellitus, cancer, dementia     Social History:  Smoking Status: former smoker 3 packs a day for 4 years   Smokeless Tobacco: Never Used  Alcohol Use: Yes  Drug Use: No    Review of Systems   Musculoskeletal:        Right arm pain    Skin: Negative for wound.   Neurological: Negative for headaches.   All other systems reviewed and are negative.    Physical Exam     Patient Vitals for the past 24 hrs:   BP Temp Temp src Pulse Resp SpO2 Height Weight   04/27/20 1720 104/65 98.6  F (37  C) Oral 111 18 97 % 1.626 m (5' 4\") 68.5 kg (151 lb)     Physical Exam  General: Alert, appears well-developed and well-nourished. Cooperative.     In moderate distress  HEENT:  Head:  Atraumatic  Ears:  External ears are normal  Mouth/Throat:  Oropharynx is without erythema or exudate and mucous membranes are moist.   Eyes:   Conjunctivae normal and EOM are normal. No scleral icterus.  Neck:   Normal range of motion. " Neck supple.  CV:  Normal rate, regular rhythm, normal heart sounds and radial pulses are 2+ and symmetric.  No murmur.  Resp:  Breath sounds are clear bilaterally    Non-labored, no retractions or accessory muscle use  GI:  Abdomen is soft, no distension, no tenderness. No rebound or guarding.  No CVA tenderness bilaterally  MS:  Holding right arm at 90 degrees.    Back atraumatic.    No midline cervical, thoracic, or lumbar tenderness    Right Elbow:    The humerus is tender to the distal aspect near elbow    The olecranon is non-tender    The lateral and medial supracondylar regions are non-tender    There is no obvious clinical effusion    There is pain with Pronation and Supination -- this portion of exam extremely limited due to pain in elbow joint    There is pain with Flexion and Extension - this portion of exam extremely limited due to pain in elbow joint    The radial head and neck are tender    The proximal ulnar is tender, but there is no step off    Distal Hand Exam:    The finger flexors (FDS/FDP) are intact    The finger extensors are intact    The thumb exam is normal, including:    Adduction, abduction, flexion, extension, opposition    There are no sensory deficits    Median, Ulnar, and Radial nerve function is normal    Radial artery pulsations are normal    Capillary refill is normal  Skin:  Warm and dry.  No rash or lesions noted.  Neuro: Alert. Normal strength.  GCS: 15  Psych:  Normal mood and affect.    Emergency Department Course   Imaging:  Radiology findings were communicated with the patient who voiced understanding of the findings.    Humerus XR, G/E 2 views, right  IMPRESSION: Within normal limits. No fracture.  Reading per radiology    Elbow XR, G/E 3 views, right  IMPRESSION: No fracture is identified. However, there is a large joint effusion suggesting an intra-articular injury. Follow-up radiographs in a few days may be beneficial to evaluate for an occult fracture.  Reading per  radiology    Radius/Ulna XR, PA & LAT, right  IMPRESSION: Within normal limits. No fracture.  Reading per radiology     Procedures    Sugar tongue Splint Placement     Splint was applied and after placement I checked and adjusted the fit to ensure proper positioning. Patient was more comfortable with splint in place. Sensation and circulation are intact after splint placement.      Interventions:  1919 Tylenol 650 mg Oral    Emergency Department Course:  Nursing notes and vitals reviewed.    The patient was sent for a Radius Ulna XR right while in the emergency department, results above.      1840 I performed an exam of the patient as documented above.     The patient was sent for a XR elbow and Humerus right while in the emergency department, results above.      1943 I rechecked the patient. Explained findings to the Patient. I placed the patient in a sugar tongue splint as noted above.     Findings and plan explained to the Patient. Patient discharged home with instructions regarding supportive care, medications, and reasons to return. The importance of close follow-up was reviewed. The patient was prescribed Oxycodone.      Impression & Plan    Medical Decision Making:  Andie Elkins is a 54 year old female who presents after a mechanical fall up the stairs landing on her outstretched right hand with significant pain to the outstretched right elbow. She has minimal extension or flexion of the elbow due to pain. Xrays shows no fracture or dislocation but there is a suspicious likely effusion to the elbow. I suspect potential hemarthrosis consistent with the traumatic injury she sustained. I have a high concern for an occult fracture within the right elbow, potentially occult supracondylar fx and will ultimately recommend close outpatient follow up with [facility] later this week. She was placed in a sugar tong splint with sling for now with plans for pain control and elevation at home. Distal CMS was intact to the  right upper extremity which is her dominant extremity. Close outpatient follow up with orthopedic surgery understood. All questions answered prior to discharge home.      Diagnosis:    ICD-10-CM    1. Right elbow pain  M25.521    2. Pain of right forearm  M79.631    3. Fall, initial encounter  W19.XXXA    4. Effusion of right elbow  M25.421     Concern for occult fracture or intra-articular injury       Disposition:   discharged to home    Discharge Medications:  Discharge Medication List as of 4/27/2020  8:11 PM      START taking these medications    Details   oxyCODONE-acetaminophen (PERCOCET) 5-325 MG tablet Take 1-2 tablets by mouth every 4 hours as needed for pain, Disp-12 tablet,R-0, Local Print             Scribe Disclosure:  I, Nani Melendez, am serving as a scribe at 6:40 PM on 4/27/2020 to document services personally performed by Popeye Whitaker MD based on my observations and the provider's statements to me.   [facility] EMERGENCY DEPARTMENT       Popeye Whitaker MD  04/28/20 1143

## 2020-04-27 NOTE — LETTER
April 27, 2020      To Whom It May Concern:      Andie Elkins was seen in our Emergency Department today, 04/27/20.  I expect her condition to improve over the next 5 days.  She may return to work/school when improved.    Sincerely,        Breonna Massey RN

## 2020-04-28 NOTE — DISCHARGE INSTRUCTIONS
Discharge Instructions  Splint Care    You had a splint put on today to help protect your injury and help it heal.  Splints are used to treat things like strains, sprains, large cuts, and fractures (broken bones). Splints are temporary and are designed to allow for swelling.    Be sure your splint is not too tight!  If you splint is too tight, it may cause loss of blood supply.  Signs of your splint being too tight include: your arm or leg hurting a lot more; your fingers or toes getting numb, cold, pale or blue; or your child is crying, fussing or seeming restless.    Generally, every Emergency Department visit should have a follow-up clinic visit with either a primary or a specialty clinic/provider. Please follow-up as instructed by your emergency provider today.  Return to the Emergency Department right away if:  You have increased pain or pressure around the injury.  You have numbness, tingling, or cool, pale, or blue toes or fingers past the injury.  Your child is more fussy than normal, crying a lot, or restless.  Your splint becomes soft, breaks, or is wet.  Your splint begins to smell bad.  Your splint is cutting into your skin.    Home care:  Keep the injured area above the level of your heart while laying or sitting down.  This will help decrease the swelling and the pain.  Keep the splint dry.  Do not put objects down or inside the splint.  If there is an elastic bandage (Ace  wrap) holding the splint on this may be loosened slightly to relieve pressure or pain.  If pain continues return to the Emergency Department right away.  Do not remove your splint by yourself unless told to by your provider.    Follow-up:  Sometimes the splint put on in the Emergency Department needs to be changed once the swelling has gone down and a more permanent cast needs to be placed.  This is usually done by a bone specialist provider (Orthopedist).  Follow the instructions given to you by your provider today.    If you were  given a prescription for medicine here today, be sure to read all of the information (including the package insert) that comes with your prescription.  This will include important information about the medicine, its side effects, and any warnings that you need to know about.  The pharmacist who fills the prescription can provide more information and answer questions you may have about the medicine.  If you have questions or concerns that the pharmacist cannot address, please call or return to the Emergency Department.     Remember that you can always come back to the Emergency Department if you are not able to see your regular provider in the amount of time listed above, if you get any new symptoms, or if there is anything that worries you.  Opioid Medication Information    You have been given a prescription for an opioid (narcotic) pain medicine and/or have received a pain medicine while here in the Emergency Department. These medicines can make you drowsy or impaired. You must not drive, operate dangerous equipment, or engage in any other dangerous activities while taking these medications. If you drive while taking these medications, you could be arrested for driving under the influence (DUI). Do not drink any alcohol while you are taking these medications.     Opioid pain medications can cause addiction. If you have a history of chemical dependency of any type, you are at a higher risk of becoming addicted to pain medications.  Only take these prescribed medications to treat your pain when all other options have been tried. Take it for as short a time and as few doses as possible. Store your pain pills in a secure place, as they are frequently stolen and provide a dangerous opportunity for children or visitors in your house to start abusing these powerful medications. We will not replace any lost or stolen medicine.    If you do not finish your medication, it is a good idea to get rid of it but please do not  flush it down the toilet. Please dispose of the remaining medication at a local pharmacy or law enforcement facility. The [place] [employer] has additional information on medication disposal: [email or web address]://[email or web address]/living-green/managing-unwanted-medications.      Many prescription pain medications contain Tylenol  (acetaminophen), including Vicodin , Tylenol #3 , Norco , Lortab , and Percocet .  You should not take any extra pills of Tylenol  if you are using these prescription medications or you can get very sick.  Do not ever take more than 3000 mg of acetaminophen in any 24 hour period.    All opioids tend to cause constipation. Drink plenty of water and eat foods that have a lot of fiber, such as fruits, vegetables, prune juice, apple juice and high fiber cereal.  Take a laxative if you don t move your bowels at least every other day. Miralax , Milk of Magnesia, Colace , or Senna  can be used to keep you regular.

## 2020-11-29 ENCOUNTER — HEALTH MAINTENANCE LETTER (OUTPATIENT)
Age: 55
End: 2020-11-29

## 2021-04-10 ENCOUNTER — HEALTH MAINTENANCE LETTER (OUTPATIENT)
Age: 56
End: 2021-04-10

## 2021-04-17 ENCOUNTER — NURSE TRIAGE (OUTPATIENT)
Dept: NURSING | Facility: CLINIC | Age: 56
End: 2021-04-17

## 2021-04-17 NOTE — TELEPHONE ENCOUNTER
Onset of symptoms  1 week ago, sx include , a cough, fever for 3 days.100-102 temp range (o) She is feeling some difficulty breathing . If she tries to take a deep breath she coughs . She has had occasional  Productive cough. She has had an exposure to person with positive covid sx.     Reason for Disposition    [1] Adult with possible COVID-19 symptoms AND [2] triager concerned about severity of symptoms or other causes    Additional Information    Negative: SEVERE difficulty breathing (e.g., struggling for each breath, speaks in single words)    Negative: Difficult to awaken or acting confused (e.g., disoriented, slurred speech)    Negative: Bluish (or gray) lips or face now    Negative: Shock suspected (e.g., cold/pale/clammy skin, too weak to stand, low BP, rapid pulse)    Negative: Sounds like a life-threatening emergency to the triager    Negative: [1] COVID-19 exposure AND [2] no symptoms    Negative: [1] Lives with someone known to have influenza (flu test positive) AND [2] flu-like symptoms (e.g., cough, runny nose, sore throat, SOB; with or without fever)    Negative: COVID-19 vaccine reaction suspected (e.g., fever, headache, muscle aches) occurring during days 1-3 after getting vaccine    Negative: COVID-19 vaccine, questions about    Negative: COVID-19 and breastfeeding, questions about    Negative: Shock suspected (e.g., cold/pale/clammy skin, too weak to stand, low BP, rapid pulse)    Negative: Difficult to awaken or acting confused (e.g., disoriented, slurred speech)    Negative: [1] Difficulty breathing AND [2] bluish lips, tongue or face    Negative: New onset rash with multiple purple (or blood-colored) spots or dots    Negative: Sounds like a life-threatening emergency to the triager    Negative: Fever in a cancer patient who is currently (or recently) receiving chemotherapy or radiation therapy, or cancer patient who has metastatic or end-stage cancer and is receiving palliative care     "Negative: Pregnant    Negative: Postpartum (from 0 to 6 weeks after delivery)    Negative: Fever onset within 24 hours of receiving VACCINE    Negative: [1] Fever AND [2] within 21 days of Ebola EXPOSURE    Negative: Other symptom is present, see that guideline  (e.g., symptoms of cough, runny nose, sore throat, earache, abdominal pain, diarrhea, vomiting)    Negative: [1] Headache AND [2] stiff neck (can't touch chin to chest)    Negative: Difficulty breathing    Negative: IV drug abuse    Negative: [1] Drinking very little AND [2] dehydration suspected (e.g., no urine > 12 hours, very dry mouth, very lightheaded)    Negative: Patient sounds very sick or weak to the triager  (Exception: mild weakness and hasn't taken fever medicine)    Negative: Fever > 104 F (40 C)    Negative: [1] Fever > 101 F (38.3 C) AND [2] age > 60    Negative: [1] Fever > 100.0 F (37.8 C) AND [2] bedridden (e.g., nursing home patient, CVA, chronic illness, recovering from surgery)    Negative: [1] Fever > 100.0 F (37.8 C) AND [2] indwelling urinary catheter (e.g., Salas, [place])    Negative: [1] Fever > 100.0 F (37.8 C) AND [2] has port (portacath), central line, or PICC line    Negative: [1] Fever > 100.0 F (37.8 C) AND [2] diabetes mellitus or weak immune system (e.g., HIV positive, cancer chemo, splenectomy, organ transplant, chronic steroids)    Negative: [1] Fever > 100.0 F (37.8 C) AND [2] surgery in the last month    Negative: Transplant patient (e.g., kidney, liver, lung, heart)    Fever present > 3 days (72 hours)    Answer Assessment - Initial Assessment Questions  1. COVID-19 DIAGNOSIS: \"Who made your Coronavirus (COVID-19) diagnosis?\" \"Was it confirmed by a positive lab test?\" If not diagnosed by a HCP, ask \"Are there lots of cases (community spread) where you live?\" (See public health department website, if unsure)     Not tested  2. COVID-19 EXPOSURE: \"Was there any known exposure to COVID before the symptoms began?\" CDC " "Definition of close contact: within 6 feet (2 meters) for a total of 15 minutes or more over a 24-hour period.       yes  3. ONSET: \"When did the COVID-19 symptoms start?\"       week ago  4. WORST SYMPTOM: \"What is your worst symptom?\" (e.g., cough, fever, shortness of breath, muscle aches)       Fever, cough, shortness of breath   5. COUGH: \"Do you have a cough?\" If so, ask: \"How bad is the cough?\"        Yes, it ids hard to cough  6. FEVER: \"Do you have a fever?\" If so, ask: \"What is your temperature, how was it measured, and when did it start?\"      Range 100-102  7. RESPIRATORY STATUS: \"Describe your breathing?\" (e.g., shortness of breath, wheezing, unable to speak)       Feels tight , and if active is winded , cannot lay on her back   8. BETTER-SAME-WORSE: \"Are you getting better, staying the same or getting worse compared to yesterday?\"  If getting worse, ask, \"In what way?\"      Worse , increased difficulty in getting full breath   9. HIGH RISK DISEASE: \"Do you have any chronic medical problems?\" (e.g., asthma, heart or lung disease, weak immune system, obesity, etc.)      Not aware   10. PREGNANCY: \"Is there any chance you are pregnant?\" \"When was your last menstrual period?\"        NA   11. OTHER SYMPTOMS: \"Do you have any other symptoms?\"  (e.g., chills, fatigue, headache, loss of smell or taste, muscle pain, sore throat; new loss of smell or taste especially support the diagnosis of COVID-19)       Fatigue    Protocols used: CORONAVIRUS (COVID-19) DIAGNOSED OR [id number] 1.3, FEVER-A-AH      "

## 2021-09-25 ENCOUNTER — HEALTH MAINTENANCE LETTER (OUTPATIENT)
Age: 56
End: 2021-09-25

## 2022-05-01 ENCOUNTER — HEALTH MAINTENANCE LETTER (OUTPATIENT)
Age: 57
End: 2022-05-01

## 2022-12-26 ENCOUNTER — HEALTH MAINTENANCE LETTER (OUTPATIENT)
Age: 57
End: 2022-12-26

## 2023-06-02 ENCOUNTER — HEALTH MAINTENANCE LETTER (OUTPATIENT)
Age: 58
End: 2023-06-02

## 2024-02-04 ENCOUNTER — HEALTH MAINTENANCE LETTER (OUTPATIENT)
Age: 59
End: 2024-02-04

## 2024-06-23 ENCOUNTER — HEALTH MAINTENANCE LETTER (OUTPATIENT)
Age: 59
End: 2024-06-23